# Patient Record
Sex: MALE | Race: WHITE | NOT HISPANIC OR LATINO | Employment: OTHER | URBAN - METROPOLITAN AREA
[De-identification: names, ages, dates, MRNs, and addresses within clinical notes are randomized per-mention and may not be internally consistent; named-entity substitution may affect disease eponyms.]

---

## 2019-05-22 ENCOUNTER — TELEPHONE (OUTPATIENT)
Dept: GASTROENTEROLOGY | Facility: CLINIC | Age: 59
End: 2019-05-22

## 2019-05-29 ENCOUNTER — PREP FOR SURGERY (OUTPATIENT)
Dept: OTHER | Facility: HOSPITAL | Age: 59
End: 2019-05-29

## 2019-05-29 DIAGNOSIS — Z86.010 PERSONAL HISTORY OF COLONIC POLYPS: Primary | ICD-10-CM

## 2019-05-31 PROBLEM — Z86.010 PERSONAL HISTORY OF COLONIC POLYPS: Status: ACTIVE | Noted: 2019-05-31

## 2019-05-31 PROBLEM — Z86.0100 PERSONAL HISTORY OF COLONIC POLYPS: Status: ACTIVE | Noted: 2019-05-31

## 2019-05-31 NOTE — TELEPHONE ENCOUNTER
CALLED AND SPOKE WITH PATIENT.  [facility] AT 3[facility] - ARRIVE 2PM.  WILL MAIL INSTRUCTIONS.

## 2019-07-30 ENCOUNTER — ANESTHESIA (OUTPATIENT)
Dept: GASTROENTEROLOGY | Facility: HOSPITAL | Age: 59
End: 2019-07-30

## 2019-07-30 ENCOUNTER — ANESTHESIA EVENT (OUTPATIENT)
Dept: GASTROENTEROLOGY | Facility: HOSPITAL | Age: 59
End: 2019-07-30

## 2019-07-30 ENCOUNTER — HOSPITAL ENCOUNTER (OUTPATIENT)
Facility: HOSPITAL | Age: 59
Setting detail: HOSPITAL OUTPATIENT SURGERY
Discharge: HOME | End: 2019-07-30
Attending: INTERNAL MEDICINE | Admitting: INTERNAL MEDICINE

## 2019-07-30 VITALS
HEART RATE: 69 BPM | BODY MASS INDEX: 28.09 KG/M2 | WEIGHT: 218.9 LBS | TEMPERATURE: 98.2 F | DIASTOLIC BLOOD PRESSURE: 89 MMHG | SYSTOLIC BLOOD PRESSURE: 122 MMHG | OXYGEN SATURATION: 97 % | RESPIRATION RATE: 16 BRPM | HEIGHT: 74 IN

## 2019-07-30 DIAGNOSIS — Z86.010 PERSONAL HISTORY OF COLONIC POLYPS: ICD-10-CM

## 2019-07-30 LAB — GLUCOSE BLDC GLUCOMTR-MCNC: 100 MG/DL (ref 70–130)

## 2019-07-30 PROCEDURE — 25010000002 PROPOFOL 10 MG/ML EMULSION: Performed by: ANESTHESIOLOGY

## 2019-07-30 PROCEDURE — 45385 COLONOSCOPY W/LESION REMOVAL: CPT | Performed by: INTERNAL MEDICINE

## 2019-07-30 PROCEDURE — 82962 GLUCOSE BLOOD TEST: CPT

## 2019-07-30 PROCEDURE — 45380 COLONOSCOPY AND BIOPSY: CPT | Performed by: INTERNAL MEDICINE

## 2019-07-30 PROCEDURE — 88305 TISSUE EXAM BY PATHOLOGIST: CPT | Performed by: INTERNAL MEDICINE

## 2019-07-30 RX ORDER — CHOLECALCIFEROL (VITAMIN D3) 125 MCG
500 CAPSULE ORAL DAILY
COMMUNITY

## 2019-07-30 RX ORDER — SODIUM CHLORIDE 0.9 % (FLUSH) 0.9 %
3-10 SYRINGE (ML) INJECTION AS NEEDED
Status: DISCONTINUED | OUTPATIENT
Start: 2019-07-30 | End: 2019-07-30 | Stop reason: HOSPADM

## 2019-07-30 RX ORDER — PROMETHAZINE HYDROCHLORIDE 25 MG/1
25 SUPPOSITORY RECTAL ONCE AS NEEDED
Status: DISCONTINUED | OUTPATIENT
Start: 2019-07-30 | End: 2019-07-30 | Stop reason: HOSPADM

## 2019-07-30 RX ORDER — PROMETHAZINE HYDROCHLORIDE 25 MG/ML
12.5 INJECTION, SOLUTION INTRAMUSCULAR; INTRAVENOUS ONCE AS NEEDED
Status: DISCONTINUED | OUTPATIENT
Start: 2019-07-30 | End: 2019-07-30 | Stop reason: HOSPADM

## 2019-07-30 RX ORDER — SODIUM CHLORIDE 0.9 % (FLUSH) 0.9 %
3 SYRINGE (ML) INJECTION EVERY 12 HOURS SCHEDULED
Status: DISCONTINUED | OUTPATIENT
Start: 2019-07-30 | End: 2019-07-30 | Stop reason: HOSPADM

## 2019-07-30 RX ORDER — PROPOFOL 10 MG/ML
VIAL (ML) INTRAVENOUS CONTINUOUS PRN
Status: DISCONTINUED | OUTPATIENT
Start: 2019-07-30 | End: 2019-07-30 | Stop reason: SURG

## 2019-07-30 RX ORDER — PROPOFOL 10 MG/ML
VIAL (ML) INTRAVENOUS AS NEEDED
Status: DISCONTINUED | OUTPATIENT
Start: 2019-07-30 | End: 2019-07-30 | Stop reason: SURG

## 2019-07-30 RX ORDER — PROMETHAZINE HYDROCHLORIDE 25 MG/1
25 TABLET ORAL ONCE AS NEEDED
Status: DISCONTINUED | OUTPATIENT
Start: 2019-07-30 | End: 2019-07-30 | Stop reason: HOSPADM

## 2019-07-30 RX ORDER — LIDOCAINE HYDROCHLORIDE 20 MG/ML
INJECTION, SOLUTION INFILTRATION; PERINEURAL AS NEEDED
Status: DISCONTINUED | OUTPATIENT
Start: 2019-07-30 | End: 2019-07-30 | Stop reason: SURG

## 2019-07-30 RX ORDER — SODIUM CHLORIDE, SODIUM LACTATE, POTASSIUM CHLORIDE, CALCIUM CHLORIDE 600; 310; 30; 20 MG/100ML; MG/100ML; MG/100ML; MG/100ML
30 INJECTION, SOLUTION INTRAVENOUS CONTINUOUS PRN
Status: DISCONTINUED | OUTPATIENT
Start: 2019-07-30 | End: 2019-07-30 | Stop reason: HOSPADM

## 2019-07-30 RX ADMIN — SODIUM CHLORIDE, POTASSIUM CHLORIDE, SODIUM LACTATE AND CALCIUM CHLORIDE 30 ML/HR: 600; 310; 30; 20 INJECTION, SOLUTION INTRAVENOUS at 14:08

## 2019-07-30 RX ADMIN — LIDOCAINE HYDROCHLORIDE 50 MG: 20 INJECTION, SOLUTION INFILTRATION; PERINEURAL at 15:29

## 2019-07-30 RX ADMIN — PROPOFOL 200 MCG/KG/MIN: 10 INJECTION, EMULSION INTRAVENOUS at 15:32

## 2019-07-30 RX ADMIN — PROPOFOL 100 MG: 10 INJECTION, EMULSION INTRAVENOUS at 15:31

## 2019-07-30 NOTE — ANESTHESIA PREPROCEDURE EVALUATION
Anesthesia Evaluation     Patient summary reviewed and Nursing notes reviewed   NPO Solid Status: > 8 hours  NPO Liquid Status: > 2 hours           Airway   Mallampati: II  TM distance: <3 FB  Neck ROM: full  Possible difficult intubation  Dental - normal exam     Pulmonary - normal exam   (+) sleep apnea (bipap),   Cardiovascular - normal exam    (+) hypertension less than 2 medications,       Neuro/Psych  GI/Hepatic/Renal/Endo    (+)   diabetes mellitus type 2,     Musculoskeletal     Abdominal    Substance History      OB/GYN          Other                        Anesthesia Plan    ASA 2     MAC     Anesthetic plan, all risks, benefits, and alternatives have been provided, discussed and informed consent has been obtained with: patient.

## 2019-07-30 NOTE — ANESTHESIA POSTPROCEDURE EVALUATION
"Patient: Noah Flannery Jr.    Procedure Summary     Date:  07/30/19 Room / Location:  [facility] ENDOSCOPY 10 / [facility] ENDOSCOPY    Anesthesia Start:  1516 Anesthesia Stop:  1608    Procedure:  COLONOSCOPY to cecum into TI with cold biopsy and cold snare polypectomies (N/A ) Diagnosis:       Personal history of colonic polyps      Colon polyp      (Personal history of colonic polyps [Z86.010])    Surgeon:  Thomas Concepcion MD Provider:  Kiran Summers MD    Anesthesia Type:  MAC ASA Status:  2          Anesthesia Type: MAC  Last vitals  BP   122/89 (07/30/19 1627)   Temp   36.8 °C (98.2 °F) (07/30/19 1355)   Pulse   69 (07/30/19 1627)   Resp   16 (07/30/19 1627)     SpO2   97 % (07/30/19 1627)     Post Anesthesia Care and Evaluation    Patient location during evaluation: bedside  Patient participation: complete - patient participated  Level of consciousness: sleepy but conscious  Pain score: 0  Pain management: adequate  Airway patency: patent  Anesthetic complications: No anesthetic complications    Cardiovascular status: acceptable  Respiratory status: acceptable  Hydration status: acceptable    Comments: /89 (BP Location: Left arm, Patient Position: Sitting)   Pulse 69   Temp 36.8 °C (98.2 °F) (Oral)   Resp 16   Ht 188 cm (74\")   Wt 99.3 kg (218 lb 14.4 oz)   SpO2 97%   BMI 28.11 kg/m²         "

## 2019-08-01 LAB
CYTO UR: NORMAL
LAB AP CASE REPORT: NORMAL
PATH REPORT.FINAL DX SPEC: NORMAL
PATH REPORT.GROSS SPEC: NORMAL

## 2019-10-24 ENCOUNTER — OFFICE VISIT (OUTPATIENT)
Dept: CARDIOLOGY | Facility: CLINIC | Age: 59
End: 2019-10-24

## 2019-10-24 VITALS
HEART RATE: 90 BPM | HEIGHT: 74 IN | WEIGHT: 223 LBS | DIASTOLIC BLOOD PRESSURE: 86 MMHG | SYSTOLIC BLOOD PRESSURE: 121 MMHG | BODY MASS INDEX: 28.62 KG/M2

## 2019-10-24 DIAGNOSIS — R00.2 PALPITATIONS: Primary | ICD-10-CM

## 2019-10-24 DIAGNOSIS — I10 ESSENTIAL HYPERTENSION: ICD-10-CM

## 2019-10-24 PROCEDURE — 93000 ELECTROCARDIOGRAM COMPLETE: CPT | Performed by: INTERNAL MEDICINE

## 2019-10-24 PROCEDURE — 99203 OFFICE O/P NEW LOW 30 MIN: CPT | Performed by: INTERNAL MEDICINE

## 2019-10-24 RX ORDER — MELOXICAM 15 MG/1
TABLET ORAL AS NEEDED
COMMUNITY
Start: 2019-09-27 | End: 2021-03-10

## 2019-10-24 RX ORDER — LEVOCETIRIZINE DIHYDROCHLORIDE 5 MG/1
5 TABLET, FILM COATED ORAL EVERY EVENING
COMMUNITY

## 2019-10-24 RX ORDER — BISOPROLOL FUMARATE 5 MG/1
5 TABLET, FILM COATED ORAL DAILY
Qty: 90 TABLET | Refills: 3 | Status: SHIPPED | OUTPATIENT
Start: 2019-10-24 | End: 2020-10-23

## 2019-10-24 NOTE — PROGRESS NOTES
Subjective:        [facility]  Cardiology Consult Note    Patient Identification:  Name: Noah Flannery Jr.  Age: 59 y.o.  Sex: male  :  1960  MRN: [id number]             CC  RAPID HEART BEAT , 1 YR AGO  REAT 2-3 TIMES IN 1 YR  LAST FEW MINUTES TO FEW HRS      History of Present Illness:   H/O DM  59-year-old male here for the cardiac evaluation as the patient has been having intermittent episodes of moderate intensity palpitations starting approximately 1 year ago lasting for several minutes sudden onset associated with shortness of breath and anxiety with no chest pains  Comorbid cardiac risk factors:     Past Medical History:  Past Medical History:   Diagnosis Date   • Broken ankle    • Broken ribs     right   • Colon polyp    • Diabetes mellitus (CMS/HCC)    • Hyperlipidemia    • Hypertension    • Sleep apnea      Past Surgical History:  Past Surgical History:   Procedure Laterality Date   • ANKLE SURGERY     • APPENDECTOMY     • COLONOSCOPY     • COLONOSCOPY N/A 2019    Procedure: COLONOSCOPY to cecum into TI with cold biopsy and cold snare polypectomies;  Surgeon: Thomas Concepcion MD;  Location: [facility] ENDOSCOPY;  Service: Gastroenterology   • HERNIA REPAIR     • PARATHYROID GLAND SURGERY        Allergies:  No Known Allergies  Home Meds:    (Not in a hospital admission)  Current Meds:   [unfilled]  Social History:   Social History     Tobacco Use   • Smoking status: Never Smoker   • Smokeless tobacco: Never Used   Substance Use Topics   • Alcohol use: Yes     Frequency: Never     Comment: occ      Family History:  Family History   Problem Relation Age of Onset   • Colon cancer Neg Hx    • Colon polyps Neg Hx         Review of Systems    Constitutional: No weakness,fatigue, fever, rigors, chills   Eyes: No vision changes, eye pain   ENT/oropharynx: No difficulty swallowing, sore throat, epistaxis, changes in hearing   Cardiovascular:  Palpitations with shortness of breath    Respiratory: No shortness of breath, dyspnea on exertion, cough, wheezing hemoptysis   Gastrointestinal: No abdominal pain, nausea, vomiting, diarrhea, bloody stools   Genitourinary: No hematuria, dysuria   Neurological: No headache, tremors, numbness,  one-sided weakness    Musculoskeletal: No cramps, myalgias,  joint pain, joint swelling   Integument: No rash, edema           Constitutional:  Heart Rate:  [90] 90  BP: (121)/(86) 121/86    Physical Exam   General:  Appears in no acute distress  Eyes: PERTL,  HEENT:  No JVD. Thyroid not visibly enlarged. No mucosal pallor or cyanosis  Respiratory: Respirations regular and unlabored at rest. BBS with good air entry in all fields. No crackles, rubs or wheezes auscultated  Cardiovascular: S1S2 Regular rate and rhythm. No murmur, rub or gallop auscultated. No carotid bruits. DP/PT pulses    . No pretibial pitting edema  Gastrointestinal: Abdomen soft, flat, non tender. Bowel sounds present. No hepatosplenomegaly. No ascites  Musculoskeletal: ROSAS x4. No abnormal movements  Extremities: No digital clubbing or cyanosis  Skin: Color pink. Skin warm and dry to touch. No rashes  No xanthoma  Neuro: AAO x3 CN II-XII grossly intact            ECG 12 Lead  Date/Time: 10/24/2019 1:46 PM  Performed by: Norman Selby MD  Authorized by: Norman Selby MD   Comparison: compared with previous ECG   Similar to previous ECG  Rhythm: sinus rhythm  ST Flattening: all    Clinical impression: non-specific ECG                Cardiographics  ECG:     Telemetry:    Echocardiogram:     Imaging  Chest X-ray:     Lab Review               @[email or web address]@              Assessment:/ Recommendations / Plan:   Patient Active Problem List   Diagnosis   • Personal history of colonic polyps                    ICD-10-CM ICD-9-CM   1. Palpitations R00.2 785.1   2. Essential hypertension I10 401.9     1. Palpitations  Considering the patient's symptoms as well as clinical situation and  EKG  findings, along with cardiac risk factors, ischemic workup is necessary to rule out ischemic cardiomyopathy, stress induced arrhythmias, and functional capacity for diagnosis as well as prognostic consideration    Considering patient's medical condition as well as the risk factors, patient will require echocardiogram for further evaluation for the LV function, four-chamber evaluation, including the pressures, valvular function and  pericardial disease and pericardial effusion    - Stress Test With Myocardial Perfusion One Day  - Adult Transthoracic Echo Complete W/ Cont if Necessary Per Protocol    2. Essential hypertension  Blood pressure controlled  - Stress Test With Myocardial Perfusion One Day  - Adult Transthoracic Echo Complete W/ Cont if Necessary Per Protocol       STRESS TEST AND US    START ZIAC 5 MG PO DAILY    SEE IN 1 MONTH      Labs/tests ordered for am      Norman Selby MD  10/24/2019, 1:46 PM      EMR Dragon/Transcription:   Dictated utilizing Dragon dictation

## 2019-11-13 ENCOUNTER — HOSPITAL ENCOUNTER (OUTPATIENT)
Dept: CARDIOLOGY | Facility: HOSPITAL | Age: 59
Discharge: HOME | End: 2019-11-13
Admitting: INTERNAL MEDICINE

## 2019-11-13 ENCOUNTER — HOSPITAL ENCOUNTER (OUTPATIENT)
Dept: CARDIOLOGY | Facility: HOSPITAL | Age: 59
Discharge: HOME | End: 2019-11-13

## 2019-11-13 VITALS
DIASTOLIC BLOOD PRESSURE: 80 MMHG | BODY MASS INDEX: 27.21 KG/M2 | HEIGHT: 74 IN | WEIGHT: 212 LBS | SYSTOLIC BLOOD PRESSURE: 118 MMHG | HEART RATE: 71 BPM | RESPIRATION RATE: 16 BRPM | OXYGEN SATURATION: 96 %

## 2019-11-13 PROCEDURE — 93306 TTE W/DOPPLER COMPLETE: CPT

## 2019-11-13 PROCEDURE — 93018 CV STRESS TEST I&R ONLY: CPT | Performed by: INTERNAL MEDICINE

## 2019-11-13 PROCEDURE — 0 TECHNETIUM SESTAMIBI: Performed by: INTERNAL MEDICINE

## 2019-11-13 PROCEDURE — 25010000002 PERFLUTREN (DEFINITY) 8.476 MG IN SODIUM CHLORIDE (PF) 0.9 % 10 ML INJECTION: Performed by: INTERNAL MEDICINE

## 2019-11-13 PROCEDURE — 93306 TTE W/DOPPLER COMPLETE: CPT | Performed by: INTERNAL MEDICINE

## 2019-11-13 PROCEDURE — 93017 CV STRESS TEST TRACING ONLY: CPT

## 2019-11-13 PROCEDURE — 93016 CV STRESS TEST SUPVJ ONLY: CPT | Performed by: NURSE PRACTITIONER

## 2019-11-13 PROCEDURE — 78452 HT MUSCLE IMAGE SPECT MULT: CPT

## 2019-11-13 PROCEDURE — 78452 HT MUSCLE IMAGE SPECT MULT: CPT | Performed by: INTERNAL MEDICINE

## 2019-11-13 PROCEDURE — A9500 TC99M SESTAMIBI: HCPCS | Performed by: INTERNAL MEDICINE

## 2019-11-13 RX ORDER — LANCETS 28 GAUGE
EACH MISCELLANEOUS
COMMUNITY
Start: 2019-10-31 | End: 2020-12-30 | Stop reason: SDUPTHER

## 2019-11-13 RX ADMIN — SODIUM CHLORIDE 2.5 ML: 9 INJECTION INTRAMUSCULAR; INTRAVENOUS; SUBCUTANEOUS at 11:39

## 2019-11-13 RX ADMIN — TECHNETIUM TC 99M SESTAMIBI 1 DOSE: 1 INJECTION INTRAVENOUS at 07:44

## 2019-11-13 RX ADMIN — TECHNETIUM TC 99M SESTAMIBI 1 DOSE: 1 INJECTION INTRAVENOUS at 10:23

## 2019-11-14 LAB
BH CV STRESS BP STAGE 1: NORMAL
BH CV STRESS BP STAGE 2: NORMAL
BH CV STRESS BP STAGE 3: NORMAL
BH CV STRESS BP STAGE 4: NORMAL
BH CV STRESS DURATION MIN STAGE 1: 3
BH CV STRESS DURATION MIN STAGE 2: 3
BH CV STRESS DURATION MIN STAGE 3: 3
BH CV STRESS DURATION SEC STAGE 1: 0
BH CV STRESS DURATION SEC STAGE 2: 0
BH CV STRESS DURATION SEC STAGE 3: 0
BH CV STRESS DURATION SEC STAGE 4: 59
BH CV STRESS GRADE STAGE 1: 10
BH CV STRESS GRADE STAGE 2: 12
BH CV STRESS GRADE STAGE 3: 14
BH CV STRESS GRADE STAGE 4: 16
BH CV STRESS HR STAGE 1: 95
BH CV STRESS HR STAGE 2: 109
BH CV STRESS HR STAGE 3: 130
BH CV STRESS HR STAGE 4: 146
BH CV STRESS METS STAGE 1: 4.6
BH CV STRESS METS STAGE 2: 7
BH CV STRESS METS STAGE 3: 10.1
BH CV STRESS METS STAGE 4: 11
BH CV STRESS PROTOCOL 1: NORMAL
BH CV STRESS RECOVERY BP: NORMAL MMHG
BH CV STRESS RECOVERY HR: 83 BPM
BH CV STRESS RECOVERY O2: 96 %
BH CV STRESS SPEED STAGE 1: 1.7
BH CV STRESS SPEED STAGE 2: 2.5
BH CV STRESS SPEED STAGE 3: 3.4
BH CV STRESS SPEED STAGE 4: 4.2
BH CV STRESS STAGE 1: 1
BH CV STRESS STAGE 2: 2
BH CV STRESS STAGE 3: 3
BH CV STRESS STAGE 4: 4
LV EF NUC BP: 57 %
MAXIMAL PREDICTED HEART RATE: 162 BPM
PERCENT MAX PREDICTED HR: 90.12 %
STRESS BASELINE BP: NORMAL MMHG
STRESS BASELINE HR: 67 BPM
STRESS O2 SAT REST: 96 %
STRESS PERCENT HR: 106 %
STRESS POST ESTIMATED WORKLOAD: 11 METS
STRESS POST EXERCISE DUR MIN: 9 MIN
STRESS POST EXERCISE DUR SEC: 59 SEC
STRESS POST PEAK BP: NORMAL MMHG
STRESS POST PEAK HR: 146 BPM
STRESS TARGET HR: 138 BPM

## 2019-11-15 LAB
BH CV ECHO MEAS - ACS: 1.8 CM
BH CV ECHO MEAS - AO MAX PG (FULL): 4.7 MMHG
BH CV ECHO MEAS - AO MAX PG: 8 MMHG
BH CV ECHO MEAS - AO MEAN PG (FULL): 2 MMHG
BH CV ECHO MEAS - AO MEAN PG: 4 MMHG
BH CV ECHO MEAS - AO ROOT AREA (BSA CORRECTED): 1.5
BH CV ECHO MEAS - AO ROOT AREA: 9.6 CM^2
BH CV ECHO MEAS - AO ROOT DIAM: 3.5 CM
BH CV ECHO MEAS - AO V2 MAX: 141 CM/SEC
BH CV ECHO MEAS - AO V2 MEAN: 98.4 CM/SEC
BH CV ECHO MEAS - AO V2 VTI: 27.6 CM
BH CV ECHO MEAS - AVA(I,A): 2.8 CM^2
BH CV ECHO MEAS - AVA(I,D): 2.8 CM^2
BH CV ECHO MEAS - AVA(V,A): 2.4 CM^2
BH CV ECHO MEAS - AVA(V,D): 2.4 CM^2
BH CV ECHO MEAS - BSA(HAYCOCK): 2.3 M^2
BH CV ECHO MEAS - BSA: 2.3 M^2
BH CV ECHO MEAS - BZI_BMI: 28.6 KILOGRAMS/M^2
BH CV ECHO MEAS - BZI_METRIC_HEIGHT: 188 CM
BH CV ECHO MEAS - BZI_METRIC_WEIGHT: 101.2 KG
BH CV ECHO MEAS - EDV(CUBED): 85.2 ML
BH CV ECHO MEAS - EDV(MOD-SP2): 135 ML
BH CV ECHO MEAS - EDV(MOD-SP4): 148 ML
BH CV ECHO MEAS - EDV(TEICH): 87.7 ML
BH CV ECHO MEAS - EF(CUBED): 57.8 %
BH CV ECHO MEAS - EF(MOD-BP): 58 %
BH CV ECHO MEAS - EF(MOD-SP2): 55.6 %
BH CV ECHO MEAS - EF(MOD-SP4): 56.8 %
BH CV ECHO MEAS - EF(TEICH): 49.7 %
BH CV ECHO MEAS - ESV(CUBED): 35.9 ML
BH CV ECHO MEAS - ESV(MOD-SP2): 60 ML
BH CV ECHO MEAS - ESV(MOD-SP4): 64 ML
BH CV ECHO MEAS - ESV(TEICH): 44.1 ML
BH CV ECHO MEAS - FS: 25 %
BH CV ECHO MEAS - IVS/LVPW: 0.86
BH CV ECHO MEAS - IVSD: 1.2 CM
BH CV ECHO MEAS - LA DIMENSION: 4.5 CM
BH CV ECHO MEAS - LA/AO: 1.3
BH CV ECHO MEAS - LAT PEAK E' VEL: 8.3 CM/SEC
BH CV ECHO MEAS - LV DIASTOLIC VOL/BSA (35-75): 65 ML/M^2
BH CV ECHO MEAS - LV MASS(C)D: 215.1 GRAMS
BH CV ECHO MEAS - LV MASS(C)DI: 94.5 GRAMS/M^2
BH CV ECHO MEAS - LV MAX PG: 3.3 MMHG
BH CV ECHO MEAS - LV MEAN PG: 2 MMHG
BH CV ECHO MEAS - LV SYSTOLIC VOL/BSA (12-30): 28.1 ML/M^2
BH CV ECHO MEAS - LV V1 MAX: 90.2 CM/SEC
BH CV ECHO MEAS - LV V1 MEAN: 64.1 CM/SEC
BH CV ECHO MEAS - LV V1 VTI: 20 CM
BH CV ECHO MEAS - LVIDD: 4.4 CM
BH CV ECHO MEAS - LVIDS: 3.3 CM
BH CV ECHO MEAS - LVLD AP2: 8.4 CM
BH CV ECHO MEAS - LVLD AP4: 9 CM
BH CV ECHO MEAS - LVLS AP2: 6.5 CM
BH CV ECHO MEAS - LVLS AP4: 6.6 CM
BH CV ECHO MEAS - LVOT AREA (M): 3.8 CM^2
BH CV ECHO MEAS - LVOT AREA: 3.8 CM^2
BH CV ECHO MEAS - LVOT DIAM: 2.2 CM
BH CV ECHO MEAS - LVPWD: 1.4 CM
BH CV ECHO MEAS - MED PEAK E' VEL: 8.2 CM/SEC
BH CV ECHO MEAS - MV A DUR: 0.14 SEC
BH CV ECHO MEAS - MV A MAX VEL: 79.5 CM/SEC
BH CV ECHO MEAS - MV DEC SLOPE: 228 CM/SEC^2
BH CV ECHO MEAS - MV DEC TIME: 0.22 SEC
BH CV ECHO MEAS - MV E MAX VEL: 55.3 CM/SEC
BH CV ECHO MEAS - MV E/A: 0.7
BH CV ECHO MEAS - MV MAX PG: 2.9 MMHG
BH CV ECHO MEAS - MV MEAN PG: 1 MMHG
BH CV ECHO MEAS - MV P1/2T MAX VEL: 67.9 CM/SEC
BH CV ECHO MEAS - MV P1/2T: 87.2 MSEC
BH CV ECHO MEAS - MV V2 MAX: 84.6 CM/SEC
BH CV ECHO MEAS - MV V2 MEAN: 47.1 CM/SEC
BH CV ECHO MEAS - MV V2 VTI: 20.1 CM
BH CV ECHO MEAS - MVA P1/2T LCG: 3.2 CM^2
BH CV ECHO MEAS - MVA(P1/2T): 2.5 CM^2
BH CV ECHO MEAS - MVA(VTI): 3.8 CM^2
BH CV ECHO MEAS - PA ACC TIME: 0.1 SEC
BH CV ECHO MEAS - PA MAX PG (FULL): 1.2 MMHG
BH CV ECHO MEAS - PA MAX PG: 2.7 MMHG
BH CV ECHO MEAS - PA PR(ACCEL): 36.3 MMHG
BH CV ECHO MEAS - PA V2 MAX: 81.4 CM/SEC
BH CV ECHO MEAS - PULM A REVS DUR: 0.15 SEC
BH CV ECHO MEAS - PULM A REVS VEL: 25.7 CM/SEC
BH CV ECHO MEAS - PULM DIAS VEL: 28.3 CM/SEC
BH CV ECHO MEAS - PULM S/D: 2
BH CV ECHO MEAS - PULM SYS VEL: 58 CM/SEC
BH CV ECHO MEAS - PVA(V,A): 3.7 CM^2
BH CV ECHO MEAS - PVA(V,D): 3.7 CM^2
BH CV ECHO MEAS - QP/QS: 0.94
BH CV ECHO MEAS - RAP SYSTOLE: 3 MMHG
BH CV ECHO MEAS - RV MAX PG: 1.5 MMHG
BH CV ECHO MEAS - RV MEAN PG: 1 MMHG
BH CV ECHO MEAS - RV V1 MAX: 60.6 CM/SEC
BH CV ECHO MEAS - RV V1 MEAN: 44.8 CM/SEC
BH CV ECHO MEAS - RV V1 VTI: 14.5 CM
BH CV ECHO MEAS - RVOT AREA: 4.9 CM^2
BH CV ECHO MEAS - RVOT DIAM: 2.5 CM
BH CV ECHO MEAS - RVSP: 30.5 MMHG
BH CV ECHO MEAS - SI(AO): 116.7 ML/M^2
BH CV ECHO MEAS - SI(CUBED): 21.6 ML/M^2
BH CV ECHO MEAS - SI(LVOT): 33.4 ML/M^2
BH CV ECHO MEAS - SI(MOD-SP2): 33 ML/M^2
BH CV ECHO MEAS - SI(MOD-SP4): 36.9 ML/M^2
BH CV ECHO MEAS - SI(TEICH): 19.1 ML/M^2
BH CV ECHO MEAS - SV(AO): 265.5 ML
BH CV ECHO MEAS - SV(CUBED): 49.2 ML
BH CV ECHO MEAS - SV(LVOT): 76 ML
BH CV ECHO MEAS - SV(MOD-SP2): 75 ML
BH CV ECHO MEAS - SV(MOD-SP4): 84 ML
BH CV ECHO MEAS - SV(RVOT): 71.2 ML
BH CV ECHO MEAS - SV(TEICH): 43.6 ML
BH CV ECHO MEAS - TAPSE (>1.6): 2.1 CM
BH CV ECHO MEAS - TR MAX VEL: 262 CM/SEC
BH CV ECHO MEASUREMENTS AVERAGE E/E' RATIO: 6.7
BH CV XLRA - RV BASE: 4.5 CM
BH CV XLRA - RV LENGTH: 7.5 CM
BH CV XLRA - RV MID: 3.5 CM
BH CV XLRA - TDI S': 13.7 CM/SEC
LEFT ATRIUM VOLUME INDEX: 28 ML/M2
MAXIMAL PREDICTED HEART RATE: 162 BPM
STRESS TARGET HR: 138 BPM

## 2019-11-21 ENCOUNTER — OFFICE VISIT (OUTPATIENT)
Dept: CARDIOLOGY | Facility: CLINIC | Age: 59
End: 2019-11-21

## 2019-11-21 VITALS
HEART RATE: 64 BPM | SYSTOLIC BLOOD PRESSURE: 124 MMHG | WEIGHT: 228 LBS | HEIGHT: 74 IN | DIASTOLIC BLOOD PRESSURE: 85 MMHG | BODY MASS INDEX: 29.26 KG/M2

## 2019-11-21 DIAGNOSIS — I10 ESSENTIAL HYPERTENSION: ICD-10-CM

## 2019-11-21 DIAGNOSIS — R00.2 PALPITATIONS: Primary | ICD-10-CM

## 2019-11-21 PROCEDURE — 99212 OFFICE O/P EST SF 10 MIN: CPT | Performed by: INTERNAL MEDICINE

## 2019-11-21 RX ORDER — LORATADINE 10 MG/1
10 TABLET ORAL DAILY
COMMUNITY
End: 2021-04-08

## 2019-12-17 ENCOUNTER — TELEPHONE (OUTPATIENT)
Dept: CARDIOLOGY | Facility: CLINIC | Age: 59
End: 2019-12-17

## 2019-12-17 NOTE — TELEPHONE ENCOUNTER
----- Message from Najma Garcia sent at 12/17/2019  2:22 PM EST -----  Regarding: CLEARANCE NOTE  Contact: [phone]  DR HAGAN, PT HAVING SHOULDER SURGERY IN JAN OR FEB  OFFICE IS WANTING A CLEARANCE NOTE FAXED TO THEM        Will discuss with Dr Selby      PT OK TO PROCEED WITH SHOULDER SURGERY PER JESS OLEA

## 2020-01-14 ENCOUNTER — HOSPITAL ENCOUNTER (OUTPATIENT)
Dept: GENERAL RADIOLOGY | Facility: HOSPITAL | Age: 60
Discharge: HOME | End: 2020-01-14
Attending: ORTHOPAEDIC SURGERY

## 2020-01-27 ENCOUNTER — TELEPHONE (OUTPATIENT)
Dept: CARDIOLOGY | Facility: CLINIC | Age: 60
End: 2020-01-27

## 2020-01-27 NOTE — TELEPHONE ENCOUNTER
----- Message from Bárbara Pritchard sent at 1/27/2020  9:59 AM EST -----  Regarding: CARDIAC CLEARANCE FOR SHOULDER SX  Contact: [phone]  DR CHOI       Faxed letter to Dr Choi   [phone]

## 2020-03-05 ENCOUNTER — HOSPITAL ENCOUNTER (OUTPATIENT)
Dept: GENERAL RADIOLOGY | Facility: HOSPITAL | Age: 60
Discharge: HOME | End: 2020-03-05
Attending: ORTHOPAEDIC SURGERY

## 2020-03-05 LAB
ANION GAP SERPL CALC-SCNC: 22 MMOL/L (ref 8–19)
APPEARANCE UR: CLEAR
BASOPHILS # BLD AUTO: 0.07 10*3/UL (ref 0–0.2)
BASOPHILS NFR BLD AUTO: 1.3 % (ref 0–3)
BILIRUB UR QL: NEGATIVE
BUN SERPL-MCNC: 16 MG/DL (ref 5–25)
BUN/CREAT SERPL: 14 {RATIO} (ref 6–20)
CALCIUM SERPL-MCNC: 9.3 MG/DL (ref 8.7–10.4)
CHLORIDE SERPL-SCNC: 104 MMOL/L (ref 99–111)
COLOR UR: YELLOW
CONV ABS IMM GRAN: 0.02 10*3/UL (ref 0–0.2)
CONV CO2: 22 MMOL/L (ref 22–32)
CONV COLLECTION SOURCE (UA): ABNORMAL
CONV IMMATURE GRAN: 0.4 % (ref 0–1.8)
CONV UROBILINOGEN IN URINE BY AUTOMATED TEST STRIP: 0.2 {EHRLICHU}/DL (ref 0.1–1)
CREAT UR-MCNC: 1.17 MG/DL (ref 0.7–1.2)
DEPRECATED RDW RBC AUTO: 42.5 FL (ref 35.1–43.9)
EOSINOPHIL # BLD AUTO: 0.07 10*3/UL (ref 0–0.7)
EOSINOPHIL # BLD AUTO: 1.3 % (ref 0–7)
ERYTHROCYTE [DISTWIDTH] IN BLOOD BY AUTOMATED COUNT: 13.1 % (ref 11.6–14.4)
EST. AVERAGE GLUCOSE BLD GHB EST-MCNC: 166 MG/DL
GFR SERPLBLD BASED ON 1.73 SQ M-ARVRAT: >60 ML/MIN/{1.73_M2}
GLUCOSE SERPL-MCNC: 181 MG/DL (ref 70–99)
GLUCOSE UR QL: >=1000 MG/DL
HBA1C MFR BLD: 7.4 % (ref 3.5–5.7)
HCT VFR BLD AUTO: 48.8 % (ref 42–52)
HGB BLD-MCNC: 16.3 G/DL (ref 14–18)
HGB UR QL STRIP: NEGATIVE
KETONES UR QL STRIP: NEGATIVE MG/DL
LEUKOCYTE ESTERASE UR QL STRIP: NEGATIVE
LYMPHOCYTES # BLD AUTO: 1.89 10*3/UL (ref 1–5)
LYMPHOCYTES NFR BLD AUTO: 35.2 % (ref 20–45)
MCH RBC QN AUTO: 29.6 PG (ref 27–31)
MCHC RBC AUTO-ENTMCNC: 33.4 G/DL (ref 33–37)
MCV RBC AUTO: 88.6 FL (ref 80–96)
MONOCYTES # BLD AUTO: 0.44 10*3/UL (ref 0.2–1.2)
MONOCYTES NFR BLD AUTO: 8.2 % (ref 3–10)
NEUTROPHILS # BLD AUTO: 2.88 10*3/UL (ref 2–8)
NEUTROPHILS NFR BLD AUTO: 53.6 % (ref 30–85)
NITRITE UR QL STRIP: NEGATIVE
NRBC CBCN: 0 % (ref 0–0.7)
OSMOLALITY SERPL CALC.SUM OF ELEC: 304 MOSM/KG (ref 273–304)
PH UR STRIP.AUTO: 6 [PH] (ref 5–8)
PLATELET # BLD AUTO: 193 10*3/UL (ref 130–400)
PMV BLD AUTO: 10.9 FL (ref 9.4–12.4)
POTASSIUM SERPL-SCNC: 4.3 MMOL/L (ref 3.5–5.3)
PROT UR QL: NEGATIVE MG/DL
RBC # BLD AUTO: 5.51 10*6/UL (ref 4.7–6.1)
SODIUM SERPL-SCNC: 144 MMOL/L (ref 135–147)
SP GR UR: 1.03 (ref 1–1.03)
WBC # BLD AUTO: 5.37 10*3/UL (ref 4.8–10.8)

## 2020-04-28 ENCOUNTER — HOSPITAL ENCOUNTER (OUTPATIENT)
Dept: OTHER | Facility: HOSPITAL | Age: 60
Discharge: HOME | End: 2020-04-28
Attending: ORTHOPAEDIC SURGERY

## 2020-04-28 LAB
ANION GAP SERPL CALC-SCNC: 18 MMOL/L (ref 8–19)
BASOPHILS # BLD AUTO: 0.04 10*3/UL (ref 0–0.2)
BASOPHILS NFR BLD AUTO: 0.7 % (ref 0–3)
BUN SERPL-MCNC: 14 MG/DL (ref 5–25)
BUN/CREAT SERPL: 12 {RATIO} (ref 6–20)
CALCIUM SERPL-MCNC: 9.5 MG/DL (ref 8.7–10.4)
CHLORIDE SERPL-SCNC: 103 MMOL/L (ref 99–111)
CONV ABS IMM GRAN: 0.01 10*3/UL (ref 0–0.2)
CONV CO2: 25 MMOL/L (ref 22–32)
CONV IMMATURE GRAN: 0.2 % (ref 0–1.8)
CREAT UR-MCNC: 1.14 MG/DL (ref 0.7–1.2)
DEPRECATED RDW RBC AUTO: 42.7 FL (ref 35.1–43.9)
EOSINOPHIL # BLD AUTO: 0.09 10*3/UL (ref 0–0.7)
EOSINOPHIL # BLD AUTO: 1.6 % (ref 0–7)
ERYTHROCYTE [DISTWIDTH] IN BLOOD BY AUTOMATED COUNT: 13.2 % (ref 11.6–14.4)
EST. AVERAGE GLUCOSE BLD GHB EST-MCNC: 171 MG/DL
GFR SERPLBLD BASED ON 1.73 SQ M-ARVRAT: >60 ML/MIN/{1.73_M2}
GLUCOSE SERPL-MCNC: 134 MG/DL (ref 70–99)
HBA1C MFR BLD: 7.6 % (ref 3.5–5.7)
HCT VFR BLD AUTO: 46.7 % (ref 42–52)
HGB BLD-MCNC: 16 G/DL (ref 14–18)
LYMPHOCYTES # BLD AUTO: 1.99 10*3/UL (ref 1–5)
LYMPHOCYTES NFR BLD AUTO: 36.1 % (ref 20–45)
MCH RBC QN AUTO: 30 PG (ref 27–31)
MCHC RBC AUTO-ENTMCNC: 34.3 G/DL (ref 33–37)
MCV RBC AUTO: 87.6 FL (ref 80–96)
MONOCYTES # BLD AUTO: 0.45 10*3/UL (ref 0.2–1.2)
MONOCYTES NFR BLD AUTO: 8.2 % (ref 3–10)
NEUTROPHILS # BLD AUTO: 2.94 10*3/UL (ref 2–8)
NEUTROPHILS NFR BLD AUTO: 53.2 % (ref 30–85)
NRBC CBCN: 0 % (ref 0–0.7)
OSMOLALITY SERPL CALC.SUM OF ELEC: 294 MOSM/KG (ref 273–304)
PLATELET # BLD AUTO: 179 10*3/UL (ref 130–400)
PMV BLD AUTO: 10.6 FL (ref 9.4–12.4)
POTASSIUM SERPL-SCNC: 4.6 MMOL/L (ref 3.5–5.3)
RBC # BLD AUTO: 5.33 10*6/UL (ref 4.7–6.1)
SODIUM SERPL-SCNC: 141 MMOL/L (ref 135–147)
WBC # BLD AUTO: 5.52 10*3/UL (ref 4.8–10.8)

## 2020-09-08 ENCOUNTER — OFFICE VISIT (OUTPATIENT)
Dept: FAMILY MEDICINE CLINIC | Facility: CLINIC | Age: 60
End: 2020-09-08

## 2020-09-08 VITALS
HEIGHT: 74 IN | RESPIRATION RATE: 16 BRPM | BODY MASS INDEX: 28.23 KG/M2 | OXYGEN SATURATION: 97 % | DIASTOLIC BLOOD PRESSURE: 80 MMHG | WEIGHT: 220 LBS | TEMPERATURE: 98.2 F | HEART RATE: 67 BPM | SYSTOLIC BLOOD PRESSURE: 110 MMHG

## 2020-09-08 DIAGNOSIS — E11.649 TYPE 2 DIABETES MELLITUS WITH HYPOGLYCEMIA WITHOUT COMA, WITH LONG-TERM CURRENT USE OF INSULIN (HCC): Primary | ICD-10-CM

## 2020-09-08 DIAGNOSIS — E78.2 MIXED HYPERLIPIDEMIA: ICD-10-CM

## 2020-09-08 DIAGNOSIS — Z79.4 TYPE 2 DIABETES MELLITUS WITH HYPOGLYCEMIA WITHOUT COMA, WITH LONG-TERM CURRENT USE OF INSULIN (HCC): Primary | ICD-10-CM

## 2020-09-08 DIAGNOSIS — I10 ESSENTIAL HYPERTENSION: ICD-10-CM

## 2020-09-08 PROBLEM — E83.52 HYPERCALCEMIA: Status: ACTIVE | Noted: 2017-08-11

## 2020-09-08 PROBLEM — G47.33 OSA (OBSTRUCTIVE SLEEP APNEA): Status: ACTIVE | Noted: 2019-03-28

## 2020-09-08 PROBLEM — E21.3 HYPERPARATHYROIDISM: Status: ACTIVE | Noted: 2020-09-08

## 2020-09-08 PROBLEM — E78.5 HYPERLIPIDEMIA: Status: ACTIVE | Noted: 2020-09-08

## 2020-09-08 PROCEDURE — 99213 OFFICE O/P EST LOW 20 MIN: CPT | Performed by: INTERNAL MEDICINE

## 2020-09-08 RX ORDER — METFORMIN HYDROCHLORIDE 500 MG/1
TABLET, EXTENDED RELEASE ORAL DAILY
COMMUNITY
Start: 2020-08-23 | End: 2020-09-08

## 2020-09-09 LAB
ALBUMIN SERPL-MCNC: 4.9 G/DL (ref 3.8–4.9)
ALBUMIN/GLOB SERPL: 2.5 {RATIO} (ref 1.2–2.2)
ALP SERPL-CCNC: 78 IU/L (ref 39–117)
ALT SERPL-CCNC: 27 IU/L (ref 0–44)
AST SERPL-CCNC: 21 IU/L (ref 0–40)
BILIRUB SERPL-MCNC: 0.7 MG/DL (ref 0–1.2)
BUN SERPL-MCNC: 14 MG/DL (ref 6–24)
BUN/CREAT SERPL: 12 (ref 9–20)
CALCIUM SERPL-MCNC: 9.7 MG/DL (ref 8.7–10.2)
CHLORIDE SERPL-SCNC: 102 MMOL/L (ref 96–106)
CHOLEST SERPL-MCNC: 133 MG/DL (ref 100–199)
CO2 SERPL-SCNC: 26 MMOL/L (ref 20–29)
CREAT SERPL-MCNC: 1.13 MG/DL (ref 0.76–1.27)
ERYTHROCYTE [DISTWIDTH] IN BLOOD BY AUTOMATED COUNT: 14.1 % (ref 11.6–15.4)
GLOBULIN SER CALC-MCNC: 2 G/DL (ref 1.5–4.5)
GLUCOSE SERPL-MCNC: 112 MG/DL (ref 65–99)
HBA1C MFR BLD: 7.6 % (ref 4.8–5.6)
HCT VFR BLD AUTO: 47.5 % (ref 37.5–51)
HDLC SERPL-MCNC: 32 MG/DL
HGB BLD-MCNC: 15.8 G/DL (ref 13–17.7)
LDLC SERPL CALC-MCNC: 71 MG/DL (ref 0–99)
MCH RBC QN AUTO: 28.8 PG (ref 26.6–33)
MCHC RBC AUTO-ENTMCNC: 33.3 G/DL (ref 31.5–35.7)
MCV RBC AUTO: 87 FL (ref 79–97)
PLATELET # BLD AUTO: 187 X10E3/UL (ref 150–450)
POTASSIUM SERPL-SCNC: 4.1 MMOL/L (ref 3.5–5.2)
PROT SERPL-MCNC: 6.9 G/DL (ref 6–8.5)
RBC # BLD AUTO: 5.48 X10E6/UL (ref 4.14–5.8)
SODIUM SERPL-SCNC: 141 MMOL/L (ref 134–144)
TRIGL SERPL-MCNC: 173 MG/DL (ref 0–149)
VLDLC SERPL CALC-MCNC: 30 MG/DL (ref 5–40)
WBC # BLD AUTO: 6.2 X10E3/UL (ref 3.4–10.8)

## 2020-09-10 NOTE — PROGRESS NOTES
09/08/2020    CC: Hypertension (follow up)  .        HPI  Hypertension   This is a chronic problem. The current episode started more than 1 month ago. The problem has been gradually improving since onset. The problem is controlled. There are no associated agents to hypertension.        Subjective   Noah Flannery Jr. is a 60 y.o. male.      The following portions of the patient's history were reviewed and updated as appropriate: allergies, current medications, past family history, past medical history, past social history, past surgical history and problem list.    Problem List  Patient Active Problem List   Diagnosis   • Personal history of colonic polyps   • Palpitations   • Hypertension   • Hypercalcemia   • Hyperlipidemia   • Hyperparathyroidism (CMS/HCC)   • JACQUELYN (obstructive sleep apnea)       Past Medical History  Past Medical History:   Diagnosis Date   • Broken ankle    • Broken ribs     right   • Colon polyp    • Diabetes mellitus (CMS/HCC)    • Hyperlipidemia    • Hypertension    • Sleep apnea        Surgical History  Past Surgical History:   Procedure Laterality Date   • ANKLE SURGERY     • APPENDECTOMY     • COLONOSCOPY     • COLONOSCOPY N/A 7/30/2019    Procedure: COLONOSCOPY to cecum into TI with cold biopsy and cold snare polypectomies;  Surgeon: Thomas Concepcion MD;  Location: [facility] ENDOSCOPY;  Service: Gastroenterology   • HERNIA REPAIR     • PARATHYROID GLAND SURGERY     • TOTAL SHOULDER REPLACEMENT Left        Family History  Family History   Problem Relation Age of Onset   • Diabetes Mother    • Hypertension Mother    • Heart disease Mother    • Lung cancer Mother    • Liver cancer Mother    • Heart disease Father    • Lung cancer Father    • Colon cancer Neg Hx    • Colon polyps Neg Hx        Social History  Social History    Tobacco Use      Smoking status: Never Smoker      Smokeless tobacco: Never Used       Is the Patient a current tobacco user? No    Allergies  No Known  Allergies    Current Medications    Current Outpatient Medications:   •  bisoprolol (ZEBETA) 5 MG tablet, Take 1 tablet by mouth Daily., Disp: 90 tablet, Rfl: 3  •  cetirizine (zyrTEC) 10 MG tablet, Take 10 mg by mouth Daily., Disp: , Rfl:   •  dapagliflozin (FARXIGA) 5 MG tablet tablet, Take 5 mg by mouth Daily., Disp: , Rfl:   •  Lancets (FREESTYLE) lancets, , Disp: , Rfl:   •  levocetirizine (XYZAL) 5 MG tablet, Take 5 mg by mouth Every Evening., Disp: , Rfl:   •  loratadine (CLARITIN) 10 MG tablet, Take 10 mg by mouth Daily., Disp: , Rfl:   •  meloxicam (MOBIC) 15 MG tablet, As Needed., Disp: , Rfl:   •  metFORMIN (GLUCOPHAGE) 1000 MG tablet, Take 1,000 mg by mouth 2 (Two) Times a Day With Meals., Disp: , Rfl:   •  rosuvastatin (CRESTOR) 20 MG tablet, Take 20 mg by mouth Daily., Disp: , Rfl:   •  vitamin B-12 (CYANOCOBALAMIN) 500 MCG tablet, Take 500 mcg by mouth Daily., Disp: , Rfl:      Review of System  Review of Systems   Constitutional: Negative.    Eyes: Negative.    Respiratory: Negative.    Gastrointestinal: Negative.    Skin: Negative.    Psychiatric/Behavioral: Negative.      I have reviewed and confirmed the accuracy of the ROS as documented by the MA/LPN/RN Rashad Macias MD    Vitals:    09/08/20 1328   BP: 110/80   Pulse: 67   Resp: 16   Temp: 98.2 °F (36.8 °C)   SpO2: 97%     Body mass index is 28.25 kg/m².    Objective     Office Visit on 09/08/2020   Component Date Value Ref Range Status   • Glucose 09/08/2020 112* 65 - 99 mg/dL Final   • BUN 09/08/2020 14  6 - 24 mg/dL Final   • Creatinine 09/08/2020 1.13  0.76 - 1.27 mg/dL Final   • eGFR Non  Am 09/08/2020 71  >59 mL/min/1.73 Final   • eGFR African Am 09/08/2020 82  >59 mL/min/1.73 Final   • BUN/Creatinine Ratio 09/08/2020 12  9 - 20 Final   • Sodium 09/08/2020 141  134 - 144 mmol/L Final   • Potassium 09/08/2020 4.1  3.5 - 5.2 mmol/L Final   • Chloride 09/08/2020 102  96 - 106 mmol/L Final   • Total CO2 09/08/2020 26  20 - 29 mmol/L  Final   • Calcium 09/08/2020 9.7  8.7 - 10.2 mg/dL Final   • Total Protein 09/08/2020 6.9  6.0 - 8.5 g/dL Final   • Albumin 09/08/2020 4.9  3.8 - 4.9 g/dL Final   • Globulin 09/08/2020 2.0  1.5 - 4.5 g/dL Final   • A/G Ratio 09/08/2020 2.5* 1.2 - 2.2 Final   • Total Bilirubin 09/08/2020 0.7  0.0 - 1.2 mg/dL Final   • Alkaline Phosphatase 09/08/2020 78  39 - 117 IU/L Final   • AST (SGOT) 09/08/2020 21  0 - 40 IU/L Final   • ALT (SGPT) 09/08/2020 27  0 - 44 IU/L Final   • Hemoglobin A1C 09/08/2020 7.6* 4.8 - 5.6 % Final    Comment:          Prediabetes: 5.7 - 6.4           Diabetes: >6.4           Glycemic control for adults with diabetes: <7.0     • Total Cholesterol 09/08/2020 133  100 - 199 mg/dL Final   • Triglycerides 09/08/2020 173* 0 - 149 mg/dL Final   • HDL Cholesterol 09/08/2020 32* >39 mg/dL Final   • VLDL Cholesterol Bryce 09/08/2020 30  5 - 40 mg/dL Final   • LDL Chol Calc (NIH) 09/08/2020 71  0 - 99 mg/dL Final   • WBC 09/08/2020 6.2  3.4 - 10.8 x10E3/uL Final   • RBC 09/08/2020 5.48  4.14 - 5.80 x10E6/uL Final   • Hemoglobin 09/08/2020 15.8  13.0 - 17.7 g/dL Final   • Hematocrit 09/08/2020 47.5  37.5 - 51.0 % Final   • MCV 09/08/2020 87  79 - 97 fL Final   • MCH 09/08/2020 28.8  26.6 - 33.0 pg Final   • MCHC 09/08/2020 33.3  31.5 - 35.7 g/dL Final   • RDW 09/08/2020 14.1  11.6 - 15.4 % Final   • Platelets 09/08/2020 187  150 - 450 x10E3/uL Final       Physical Exam  Physical Exam   Constitutional: He is oriented to person, place, and time. He appears well-developed and well-nourished.   Eyes: Conjunctivae and EOM are normal.   Neck: Normal range of motion. Neck supple.   Cardiovascular: Normal rate, regular rhythm and normal heart sounds.   Pulmonary/Chest: Effort normal and breath sounds normal.   Abdominal: Soft. Bowel sounds are normal.   Musculoskeletal: Normal range of motion.   Neurological: He is alert and oriented to person, place, and time.   Skin: Skin is warm and dry.   Psychiatric: He has a  normal mood and affect. His behavior is normal.   Nursing note and vitals reviewed.      Assessment/Plan      Patient presents today for follow-up of hypertension.  He relates he is taking his medication as prescribed and is feeling fine.  His blood pressures noted to be 120/80 today in his left arm sitting position standard cuff ..  In the interim of visits the patient has been seen by Dr. Bustillo pulmonologist was following him for a lung lesion.  The lesion has not increased in size and Dr. Bustillo is continuing to watch it at this point.  It is anticipated that future pulmonary function tests will be utilized to help monitor his pulmonary status.  So far x-rays have not shown any evidence of malignancy.    The patient is also been followed by Dr. More, orthopedic surgeon was anticipating right shoulder replacement in the next several weeks.  Patient previously had a left shoulder replaced and is doing relatively well  his recovery.    The patient did not bring his glucometer with him today.  We'll evaluate his diabetes status with a hemoglobin A1c.        Noah was seen today for hypertension.    Diagnoses and all orders for this visit:    Type 2 diabetes mellitus with hypoglycemia without coma, with long-term current use of insulin (CMS/[facility])  -     Comprehensive metabolic panel  -     Hemoglobin A1c  -     Lipid panel  -     CBC No Differential    Mixed hyperlipidemia    Essential hypertension          Plan:  Follow-up in 5-6 months  #2.)  Continue current medication.       Rashad Macias MD  09/08/2020

## 2020-09-29 DIAGNOSIS — E11.649 TYPE 2 DIABETES MELLITUS WITH HYPOGLYCEMIA WITHOUT COMA, WITH LONG-TERM CURRENT USE OF INSULIN (HCC): Primary | ICD-10-CM

## 2020-09-29 DIAGNOSIS — Z79.4 TYPE 2 DIABETES MELLITUS WITH HYPOGLYCEMIA WITHOUT COMA, WITH LONG-TERM CURRENT USE OF INSULIN (HCC): Primary | ICD-10-CM

## 2020-10-07 ENCOUNTER — TELEPHONE (OUTPATIENT)
Dept: FAMILY MEDICINE CLINIC | Facility: CLINIC | Age: 60
End: 2020-10-07

## 2020-10-07 NOTE — TELEPHONE ENCOUNTER
PATIENT WAS CURIOUS WHY DR REFERRED TO DR SMITH INSTEAD OF DR PARK THAT HE PREVIOUSLY SAW. NOT OPPOSED TO IT, JUST CURIOUS OF IF DR SMITH IS BETTER FIT OR IF PREVIOUS DR RETIRED OR AN INSURANCE NETWORK ISSUE     [phone]

## 2020-10-08 ENCOUNTER — TELEPHONE (OUTPATIENT)
Dept: FAMILY MEDICINE CLINIC | Facility: CLINIC | Age: 60
End: 2020-10-08

## 2020-10-08 NOTE — TELEPHONE ENCOUNTER
Spoke with pt of what Dr. Macias said and he is going to call Dr. DUBON and see if he can schedule with him if not he is going to call us back to let us know. Hub please inform patient

## 2020-10-08 NOTE — TELEPHONE ENCOUNTER
PT CALLED RETURNING A PHONE CALL TO KLAUDIA, STATED THAT KLAUDIA WAS NEEDING TO GET SOME INFORMATION FROM HIM ABOUT A REFERRAL.     ATTEMPTED TO WARM TRANSFER, WAS DECLINED AND TOLD TO TAKE A MESSAGE      PLEASE ADVISE     PT CALL BACK   [phone]

## 2020-10-26 RX ORDER — BISOPROLOL FUMARATE 5 MG/1
TABLET, FILM COATED ORAL
Qty: 90 TABLET | Refills: 0 | Status: SHIPPED | OUTPATIENT
Start: 2020-10-26 | End: 2021-01-14

## 2020-10-28 ENCOUNTER — HOSPITAL ENCOUNTER (OUTPATIENT)
Dept: GENERAL RADIOLOGY | Facility: HOSPITAL | Age: 60
Discharge: HOME | End: 2020-10-28
Attending: ORTHOPAEDIC SURGERY

## 2020-10-28 LAB
ANION GAP SERPL CALC-SCNC: 15 MMOL/L (ref 8–19)
BASOPHILS # BLD AUTO: 0.05 10*3/UL (ref 0–0.2)
BASOPHILS NFR BLD AUTO: 0.9 % (ref 0–3)
BUN SERPL-MCNC: 14 MG/DL (ref 5–25)
BUN/CREAT SERPL: 12 {RATIO} (ref 6–20)
CALCIUM SERPL-MCNC: 9.3 MG/DL (ref 8.7–10.4)
CHLORIDE SERPL-SCNC: 103 MMOL/L (ref 99–111)
CONV ABS IMM GRAN: 0.03 10*3/UL (ref 0–0.2)
CONV CO2: 27 MMOL/L (ref 22–32)
CONV IMMATURE GRAN: 0.5 % (ref 0–1.8)
CREAT UR-MCNC: 1.16 MG/DL (ref 0.7–1.2)
DEPRECATED RDW RBC AUTO: 41.7 FL (ref 35.1–43.9)
EOSINOPHIL # BLD AUTO: 0.05 10*3/UL (ref 0–0.7)
EOSINOPHIL # BLD AUTO: 0.9 % (ref 0–7)
ERYTHROCYTE [DISTWIDTH] IN BLOOD BY AUTOMATED COUNT: 13.4 % (ref 11.6–14.4)
EST. AVERAGE GLUCOSE BLD GHB EST-MCNC: 174 MG/DL
GFR SERPLBLD BASED ON 1.73 SQ M-ARVRAT: >60 ML/MIN/{1.73_M2}
GLUCOSE SERPL-MCNC: 139 MG/DL (ref 70–99)
HBA1C MFR BLD: 7.7 % (ref 3.5–5.7)
HCT VFR BLD AUTO: 45.8 % (ref 42–52)
HGB BLD-MCNC: 15.7 G/DL (ref 14–18)
LYMPHOCYTES # BLD AUTO: 1.72 10*3/UL (ref 1–5)
LYMPHOCYTES NFR BLD AUTO: 30.4 % (ref 20–45)
MCH RBC QN AUTO: 29.6 PG (ref 27–31)
MCHC RBC AUTO-ENTMCNC: 34.3 G/DL (ref 33–37)
MCV RBC AUTO: 86.4 FL (ref 80–96)
MONOCYTES # BLD AUTO: 0.44 10*3/UL (ref 0.2–1.2)
MONOCYTES NFR BLD AUTO: 7.8 % (ref 3–10)
NEUTROPHILS # BLD AUTO: 3.37 10*3/UL (ref 2–8)
NEUTROPHILS NFR BLD AUTO: 59.5 % (ref 30–85)
NRBC CBCN: 0 % (ref 0–0.7)
OSMOLALITY SERPL CALC.SUM OF ELEC: 295 MOSM/KG (ref 273–304)
PLATELET # BLD AUTO: 185 10*3/UL (ref 130–400)
PMV BLD AUTO: 10.8 FL (ref 9.4–12.4)
POTASSIUM SERPL-SCNC: 4.3 MMOL/L (ref 3.5–5.3)
RBC # BLD AUTO: 5.3 10*6/UL (ref 4.7–6.1)
SODIUM SERPL-SCNC: 141 MMOL/L (ref 135–147)
WBC # BLD AUTO: 5.66 10*3/UL (ref 4.8–10.8)

## 2020-10-29 RX ORDER — DAPAGLIFLOZIN 5 MG/1
5 TABLET, FILM COATED ORAL DAILY
Qty: 30 TABLET | Refills: 5 | Status: SHIPPED | OUTPATIENT
Start: 2020-10-29 | End: 2021-03-10 | Stop reason: CLARIF

## 2020-11-04 ENCOUNTER — TELEPHONE (OUTPATIENT)
Dept: FAMILY MEDICINE CLINIC | Facility: CLINIC | Age: 60
End: 2020-11-04

## 2020-11-04 DIAGNOSIS — Z79.4 TYPE 2 DIABETES MELLITUS WITH HYPERGLYCEMIA, WITH LONG-TERM CURRENT USE OF INSULIN (HCC): Primary | ICD-10-CM

## 2020-11-04 DIAGNOSIS — E11.65 TYPE 2 DIABETES MELLITUS WITH HYPERGLYCEMIA, WITH LONG-TERM CURRENT USE OF INSULIN (HCC): Primary | ICD-10-CM

## 2020-11-04 NOTE — TELEPHONE ENCOUNTER
Pt's wife called stating will be seen at Dr Mobley's (endocrinology) office soon, they will need the referral, last office notes and labs.  Fax: [phone]  Tel: [phone]

## 2020-11-25 DIAGNOSIS — Z79.4 TYPE 2 DIABETES MELLITUS WITH HYPERGLYCEMIA, WITH LONG-TERM CURRENT USE OF INSULIN (HCC): Primary | ICD-10-CM

## 2020-11-25 DIAGNOSIS — E11.65 TYPE 2 DIABETES MELLITUS WITH HYPERGLYCEMIA, WITH LONG-TERM CURRENT USE OF INSULIN (HCC): Primary | ICD-10-CM

## 2020-12-09 ENCOUNTER — OFFICE VISIT (OUTPATIENT)
Dept: FAMILY MEDICINE CLINIC | Facility: CLINIC | Age: 60
End: 2020-12-09

## 2020-12-09 VITALS
TEMPERATURE: 98.9 F | WEIGHT: 223 LBS | HEIGHT: 74 IN | HEART RATE: 61 BPM | BODY MASS INDEX: 28.62 KG/M2 | OXYGEN SATURATION: 99 % | SYSTOLIC BLOOD PRESSURE: 130 MMHG | DIASTOLIC BLOOD PRESSURE: 80 MMHG

## 2020-12-09 DIAGNOSIS — E11.65 TYPE 2 DIABETES MELLITUS WITH HYPERGLYCEMIA, WITH LONG-TERM CURRENT USE OF INSULIN (HCC): Primary | ICD-10-CM

## 2020-12-09 DIAGNOSIS — R91.8 LUNG MASS: ICD-10-CM

## 2020-12-09 DIAGNOSIS — I10 ESSENTIAL HYPERTENSION: ICD-10-CM

## 2020-12-09 DIAGNOSIS — Z79.4 TYPE 2 DIABETES MELLITUS WITH HYPERGLYCEMIA, WITH LONG-TERM CURRENT USE OF INSULIN (HCC): Primary | ICD-10-CM

## 2020-12-09 PROCEDURE — 99214 OFFICE O/P EST MOD 30 MIN: CPT | Performed by: INTERNAL MEDICINE

## 2020-12-09 RX ORDER — ASPIRIN 81 MG/1
TABLET, COATED ORAL
COMMUNITY
Start: 2020-11-11 | End: 2021-03-10

## 2020-12-09 RX ORDER — NAPROXEN 500 MG/1
TABLET ORAL
COMMUNITY
Start: 2020-11-11 | End: 2020-12-11

## 2020-12-11 ENCOUNTER — OFFICE VISIT (OUTPATIENT)
Dept: CARDIOLOGY | Facility: CLINIC | Age: 60
End: 2020-12-11

## 2020-12-11 VITALS
WEIGHT: 232 LBS | DIASTOLIC BLOOD PRESSURE: 83 MMHG | SYSTOLIC BLOOD PRESSURE: 121 MMHG | HEART RATE: 64 BPM | HEIGHT: 74 IN | BODY MASS INDEX: 29.77 KG/M2

## 2020-12-11 DIAGNOSIS — I10 ESSENTIAL HYPERTENSION: Primary | ICD-10-CM

## 2020-12-11 DIAGNOSIS — R00.2 PALPITATIONS: ICD-10-CM

## 2020-12-11 DIAGNOSIS — G47.33 OBSTRUCTIVE SLEEP APNEA SYNDROME: ICD-10-CM

## 2020-12-11 PROCEDURE — 93000 ELECTROCARDIOGRAM COMPLETE: CPT | Performed by: NURSE PRACTITIONER

## 2020-12-11 PROCEDURE — 99213 OFFICE O/P EST LOW 20 MIN: CPT | Performed by: NURSE PRACTITIONER

## 2020-12-11 NOTE — PROGRESS NOTES
Subjective:        Noah Flannery Jr. is a 60 y.o. male who here for follow up    No chief complaint on file.    Follow-up for hypertension    HPI     Noah Flannery is a 60-year-old male, who is new to me.  He has a history which includes JACQUELYN, hypertension, hyperlipidemia, hyperparathyroidism, diabetes mellitus, and history of palpitations.  Lipid panel on 9/8/2020 revealed , HDL 32, LDL 71, and triglycerides 173.  States his primary care physician manages his cholesterol.  Just test on 11/14/2019 indicated a normal myocardial perfusion study.  It was a low risk study.  Echo on 1/13/2019 revealed EF 58%, LAC size is borderline dilated, saline bubble study negative for PFO, and no evidence of pericardial effusion.  He denies chest pain, shortness of breath, syncope near syncope.    The following portions of the patient's history were reviewed and updated as appropriate: allergies, current medications, past family history, past medical history, past social history, past surgical history and problem list.    Past Medical History:   Diagnosis Date   • Broken ankle    • Broken ribs     right   • Colon polyp    • Diabetes mellitus (CMS/HCC)    • Hyperlipidemia    • Hypertension    • Sleep apnea          reports that he has never smoked. He has never used smokeless tobacco. He reports current alcohol use. He reports that he does not use drugs.     Family History   Problem Relation Age of Onset   • Diabetes Mother    • Hypertension Mother    • Heart disease Mother    • Lung cancer Mother    • Liver cancer Mother    • Heart disease Father    • Lung cancer Father    • Colon cancer Neg Hx    • Colon polyps Neg Hx        ROS     Review of Systems  Constitutional: No wt loss, fever, fatigue  Gastrointestinal: No nausea, abdominal pain  Behavioral/Psych: No insomnia or anxiety  Cardiovascular : Denies chest pain, shortness of breath, syncope and near syncope.      Objective:           Vitals signs and nursing note  reviewed.   Constitutional:       Appearance: Well-developed.   HENT:      Head: Normocephalic.      Right Ear: External ear normal.      Left Ear: External ear normal.   Neck:      Musculoskeletal: Normal range of motion.      Vascular: No JVD.   Pulmonary:      Effort: Pulmonary effort is normal. No respiratory distress.      Breath sounds: Normal breath sounds. No stridor. No rales.   Cardiovascular:      Normal rate. Regular rhythm.      No gallop.   Pulses:     Intact distal pulses.   Abdominal:      General: Bowel sounds are normal. There is no distension.      Palpations: Abdomen is soft.      Tenderness: There is no abdominal tenderness. There is no guarding.   Musculoskeletal: Normal range of motion.         General: No tenderness.   Skin:     General: Skin is warm.   Neurological:      Mental Status: Alert and oriented to person, place, and time.      Deep Tendon Reflexes: Reflexes are normal and symmetric.   Psychiatric:         Judgment: Judgment normal.           ECG 12 Lead    Date/Time: 12/11/2020 10:52 AM  Performed by: Samara Rankin APRN  Authorized by: Samara Rankin APRN   Comparison: compared with previous ECG from 10/24/2019  Similar to previous ECG  Rhythm: sinus rhythm          Interpretation Summary       · Moderate risk for ischemic heart disease.  · Findings consistent with an abnormal ECG stress test.  · Left ventricular ejection fraction is normal (Calculated EF = 57%).  · Myocardial perfusion imaging indicates a normal myocardial perfusion study with no evidence of ischemia.  · Impressions are consistent with a low risk study.     Asymptomatic for chest pain.  ECG is positive for ischemia:  2 mm upslope to horizontal ST segment Depression Inferior Leads  1 mm horizontal  ST segment Depression Lateral Lead V6  BP response: appropriate for Beta-blocker therapy.  Ectopy: Multifocal Bigeminy PVC's with exercise and occasional PVC's in recovery.  Exercise Tolerance:   good     Oliver treadmill score -1   Estimates an annual cardiovascular mortality of  1 %  And of 5-year survival of 91 % . Using the Duke score there is an intermediate  probability of angiographic coronary disease     Supervised by:  Samara OLEA.               Interpretation Summary    · Saline bubble study neg for PFO  · Left atrial cavity size is borderline dilated.  · Calculated EF = 58%.  · There is no evidence of pericardial effusion.                Current Outpatient Medications:   •  bisoprolol (ZEBeta) 5 MG tablet, TAKE ONE TABLET BY MOUTH DAILY, Disp: 90 tablet, Rfl: 0  •  cetirizine (zyrTEC) 10 MG tablet, Take 10 mg by mouth Daily., Disp: , Rfl:   •  dapagliflozin (Farxiga) 5 MG tablet tablet, Take 1 tablet by mouth Daily., Disp: 30 tablet, Rfl: 5  •  metFORMIN (GLUCOPHAGE) 500 MG tablet, Take 1 tablet by mouth 2 (Two) Times a Day With Meals., Disp: 180 tablet, Rfl: 2  •  rosuvastatin (CRESTOR) 20 MG tablet, Take 20 mg by mouth Daily., Disp: , Rfl:   •  vitamin B-12 (CYANOCOBALAMIN) 500 MCG tablet, Take 500 mcg by mouth Daily., Disp: , Rfl:   •  Aspirin Low Dose 81 MG EC tablet, , Disp: , Rfl:   •  glucose blood test strip, 1 each by Other route As Needed. Use as instructed, Disp: , Rfl:   •  glucose blood test strip, 1 each by Other route As Needed. Use as instructed, Disp: , Rfl:   •  Lancets (FREESTYLE) lancets, , Disp: , Rfl:   •  levocetirizine (XYZAL) 5 MG tablet, Take 5 mg by mouth Every Evening., Disp: , Rfl:   •  loratadine (CLARITIN) 10 MG tablet, Take 10 mg by mouth Daily., Disp: , Rfl:   •  meloxicam (MOBIC) 15 MG tablet, As Needed., Disp: , Rfl:      Assessment:        Patient Active Problem List   Diagnosis   • Personal history of colonic polyps   • Palpitations   • Hypertension   • Hypercalcemia   • Hyperlipidemia   • Hyperparathyroidism (CMS/HCC)   • JACQUELYN (obstructive sleep apnea)               Plan:   1.  Hypertension: He states his blood pressure is controlled while he is at  home.  Continue for bisoprolol.     Educated patient on exercising for at least 30 minutes a day for 2 to 3 days a week. Importance of controlling hypertension and blood pressure checkup on the regular basis has been explained. Hypertension as a silent killer has been discussed. Risk reduction of the weight and regular exercises to control the hypertension has been explained.    2.  Hyperlipidemia: Previous lipid panel as above.  He states his primary care physician manages his cholesterol.  Continue rouvastatin 20 mg.    Risk of the hyperlipidemia, importance of the treatment has been explained. Pros and cons of the statins has been explained. Regular blood workup as well as side effects including the liver failure, myelopathy death has been explained.      3.  JACQUELYN: CPAP compliant    4.  Palpitations: Controlled on beta-blocker.       No diagnosis found.    There are no diagnoses linked to this encounter.    COUNSELING: Done    Noah Flannery Jr.Counseling was given to patient for the following topics: diagnostic results, risk factor reductions, impressions, risks and benefits of treatment options and importance of treatment compliance .       SMOKING COUNSELING: Denies    We will follow-up in 1 year, unless he needs to be seen sooner.    Sincerely,   EMMIE Hansen  [facility]  12/11/20  10:45 EST     EMR Dragon/Transcription disclaimer:   Much of this encounter note is an electronic transcription/translation of spoken language to printed text. The electronic translation of spoken language may permit erroneous, or at times, nonsensical words or phrases to be inadvertently transcribed; Although I have reviewed the note for such errors, some may still exist.

## 2020-12-30 ENCOUNTER — TELEPHONE (OUTPATIENT)
Dept: FAMILY MEDICINE CLINIC | Facility: CLINIC | Age: 60
End: 2020-12-30

## 2020-12-30 DIAGNOSIS — E11.65 TYPE 2 DIABETES MELLITUS WITH HYPERGLYCEMIA, WITH LONG-TERM CURRENT USE OF INSULIN (HCC): ICD-10-CM

## 2020-12-30 DIAGNOSIS — Z79.4 TYPE 2 DIABETES MELLITUS WITH HYPERGLYCEMIA, WITH LONG-TERM CURRENT USE OF INSULIN (HCC): ICD-10-CM

## 2020-12-30 RX ORDER — LANCETS 28 GAUGE
EACH MISCELLANEOUS
Qty: 100 EACH | Refills: 3 | Status: SHIPPED | OUTPATIENT
Start: 2020-12-30 | End: 2022-01-13

## 2020-12-30 NOTE — PROGRESS NOTES
12/09/2020    CC: Follow-up, Hypertension, and Diabetes  .        HPI  Hypertension  This is a chronic problem. The current episode started more than 1 year ago. The problem has been waxing and waning since onset. The problem is controlled.   Diabetes         Subjective   Noah Flannery Jr. is a 60 y.o. male.      The following portions of the patient's history were reviewed and updated as appropriate: allergies, current medications, past family history and problem list.    Problem List  Patient Active Problem List   Diagnosis   • Personal history of colonic polyps   • Palpitations   • Hypertension   • Hypercalcemia   • Hyperlipidemia   • Hyperparathyroidism (CMS/HCC)   • JACQUELYN (obstructive sleep apnea)       Past Medical History  Past Medical History:   Diagnosis Date   • Broken ankle    • Broken ribs     right   • Colon polyp    • Diabetes mellitus (CMS/HCC)    • Hyperlipidemia    • Hypertension    • Sleep apnea        Surgical History  Past Surgical History:   Procedure Laterality Date   • ANKLE SURGERY     • APPENDECTOMY     • COLONOSCOPY     • COLONOSCOPY N/A 7/30/2019    Procedure: COLONOSCOPY to cecum into TI with cold biopsy and cold snare polypectomies;  Surgeon: Thomas Concepcion MD;  Location: [facility] ENDOSCOPY;  Service: Gastroenterology   • HERNIA REPAIR     • PARATHYROID GLAND SURGERY     • TOTAL SHOULDER REPLACEMENT Left    • TOTAL SHOULDER REPLACEMENT Right 11/18/2020       Family History  Family History   Problem Relation Age of Onset   • Diabetes Mother    • Hypertension Mother    • Heart disease Mother    • Lung cancer Mother    • Liver cancer Mother    • Heart disease Father    • Lung cancer Father    • Colon cancer Neg Hx    • Colon polyps Neg Hx        Social History  Social History    Tobacco Use      Smoking status: Never Smoker      Smokeless tobacco: Never Used       Is the Patient a current tobacco user? No    Allergies  No Known Allergies    Current Medications    Current  Outpatient Medications:   •  Aspirin Low Dose 81 MG EC tablet, , Disp: , Rfl:   •  bisoprolol (ZEBeta) 5 MG tablet, TAKE ONE TABLET BY MOUTH DAILY, Disp: 90 tablet, Rfl: 0  •  cetirizine (zyrTEC) 10 MG tablet, Take 10 mg by mouth Daily., Disp: , Rfl:   •  dapagliflozin (Farxiga) 5 MG tablet tablet, Take 1 tablet by mouth Daily., Disp: 30 tablet, Rfl: 5  •  levocetirizine (XYZAL) 5 MG tablet, Take 5 mg by mouth Every Evening., Disp: , Rfl:   •  loratadine (CLARITIN) 10 MG tablet, Take 10 mg by mouth Daily., Disp: , Rfl:   •  meloxicam (MOBIC) 15 MG tablet, As Needed., Disp: , Rfl:   •  rosuvastatin (CRESTOR) 20 MG tablet, Take 20 mg by mouth Daily., Disp: , Rfl:   •  vitamin B-12 (CYANOCOBALAMIN) 500 MCG tablet, Take 500 mcg by mouth Daily., Disp: , Rfl:   •  glucose blood test strip, 1 each by Other route As Needed. Use as instructed, Disp: , Rfl:   •  glucose blood test strip, 1 each by Other route Daily. Use as instructed, Disp: 50 each, Rfl: 6  •  Lancets (freestyle) lancets, Use 1 daily to help monitor glucose, Disp: 100 each, Rfl: 3  •  metFORMIN (GLUCOPHAGE) 500 MG tablet, Take 2 tablets in the morning and 1 in the afternoon, Disp: 180 tablet, Rfl: 2     Review of System  Review of Systems   Constitutional: Negative.    HENT: Negative.    Eyes: Negative.    Respiratory: Negative.      I have reviewed and confirmed the accuracy of the ROS as documented by the MA/LPN/RN Rashad Macias MD    Vitals:    12/09/20 1050   BP: 130/80   Pulse: 61   Temp: 98.9 °F (37.2 °C)   SpO2: 99%     Body mass index is 28.63 kg/m².    Objective     No visits with results within 30 Day(s) from this visit.   Latest known visit with results is:   Office Visit on 09/08/2020   Component Date Value Ref Range Status   • Glucose 09/08/2020 112* 65 - 99 mg/dL Final   • BUN 09/08/2020 14  6 - 24 mg/dL Final   • Creatinine 09/08/2020 1.13  0.76 - 1.27 mg/dL Final   • eGFR Non  Am 09/08/2020 71  >59 mL/min/1.73 Final   • eGFR African  Am 09/08/2020 82  >59 mL/min/1.73 Final   • BUN/Creatinine Ratio 09/08/2020 12  9 - 20 Final   • Sodium 09/08/2020 141  134 - 144 mmol/L Final   • Potassium 09/08/2020 4.1  3.5 - 5.2 mmol/L Final   • Chloride 09/08/2020 102  96 - 106 mmol/L Final   • Total CO2 09/08/2020 26  20 - 29 mmol/L Final   • Calcium 09/08/2020 9.7  8.7 - 10.2 mg/dL Final   • Total Protein 09/08/2020 6.9  6.0 - 8.5 g/dL Final   • Albumin 09/08/2020 4.9  3.8 - 4.9 g/dL Final   • Globulin 09/08/2020 2.0  1.5 - 4.5 g/dL Final   • A/G Ratio 09/08/2020 2.5* 1.2 - 2.2 Final   • Total Bilirubin 09/08/2020 0.7  0.0 - 1.2 mg/dL Final   • Alkaline Phosphatase 09/08/2020 78  39 - 117 IU/L Final   • AST (SGOT) 09/08/2020 21  0 - 40 IU/L Final   • ALT (SGPT) 09/08/2020 27  0 - 44 IU/L Final   • Hemoglobin A1C 09/08/2020 7.6* 4.8 - 5.6 % Final    Comment:          Prediabetes: 5.7 - 6.4           Diabetes: >6.4           Glycemic control for adults with diabetes: <7.0     • Total Cholesterol 09/08/2020 133  100 - 199 mg/dL Final   • Triglycerides 09/08/2020 173* 0 - 149 mg/dL Final   • HDL Cholesterol 09/08/2020 32* >39 mg/dL Final   • VLDL Cholesterol Bryce 09/08/2020 30  5 - 40 mg/dL Final   • LDL Chol Calc (NIH) 09/08/2020 71  0 - 99 mg/dL Final   • WBC 09/08/2020 6.2  3.4 - 10.8 x10E3/uL Final   • RBC 09/08/2020 5.48  4.14 - 5.80 x10E6/uL Final   • Hemoglobin 09/08/2020 15.8  13.0 - 17.7 g/dL Final   • Hematocrit 09/08/2020 47.5  37.5 - 51.0 % Final   • MCV 09/08/2020 87  79 - 97 fL Final   • MCH 09/08/2020 28.8  26.6 - 33.0 pg Final   • MCHC 09/08/2020 33.3  31.5 - 35.7 g/dL Final   • RDW 09/08/2020 14.1  11.6 - 15.4 % Final   • Platelets 09/08/2020 187  150 - 450 x10E3/uL Final       Physical Exam  Physical Exam  Constitutional:       Appearance: Normal appearance.   HENT:      Head: Normocephalic and atraumatic.   Neck:      Musculoskeletal: Normal range of motion and neck supple.   Cardiovascular:      Rate and Rhythm: Normal rate and regular rhythm.    Neurological:      Mental Status: He is alert.         Assessment/Plan    This patient presents today for follow-up of hypertension and review of his shoulder.  He had right shoulder surgery done by Dr. Choi several weeks ago.  Patient is in rehabilitation and is doing well.      He was recently seen by Dr. Farfan, pulmonologist and had a pulmonary function test done which was normal.  But Dr. Delaney is also helping to manage evaluation of a spot on the patient's left lower long Dr. Fuentes does not feel this is cancerous but with intended to watch it.    Patient has a history of diabetes mellitus type 2 but has had trouble getting him with his previous endocrinologist who is outside the [facility] system.  We will make a referral to Dr. diaz to take over management.  The patient's last hemoglobin A1c was elevated at 7.6 done just shy of 3 months ago. He is currently on metformin 500 mg 1 in the morning and 1 in the afternoon.          Diagnoses and all orders for this visit:    1. Type 2 diabetes mellitus with hyperglycemia, with long-term current use of insulin (CMS/[facility]) (Primary)established problem, poorly controlled, mild progression  We've asked the patient to increase his metformin to 2 in the morning and 1 in the afternoon.      2. Essential hypertension: Established problem controlled    3. Lung mass: Established problem stable             Rashad Macias MD  12/09/2020

## 2020-12-30 NOTE — TELEPHONE ENCOUNTER
PATIENT CALLING BACK. PATIENT STATED SOMEONE CALLED HIM FROM THE OFFICE AND DIDN'T LEAVE A VOICEMAIL.     CALL BACK: [phone]

## 2020-12-30 NOTE — TELEPHONE ENCOUNTER
Patient called in requesting a new prescription for the Metformin showing the change in direction of taking it.    Patient also said he needs a re-fill for     Lancets (FREESTYLE) lancets    [place] [id number] yeppt    Best call back # [phone]

## 2021-01-14 RX ORDER — BISOPROLOL FUMARATE 5 MG/1
TABLET, FILM COATED ORAL
Qty: 90 TABLET | Refills: 1 | Status: SHIPPED | OUTPATIENT
Start: 2021-01-14 | End: 2021-07-13

## 2021-03-04 NOTE — PROGRESS NOTES
Subjective   Noah Flannery Jr. is a 60 y.o. male.     New pt ref by dr Rashad Macias for dm 2/ testing bs1  x day / last dm eye exam 2020/ last dm foot exam today with dr Duffy      Patient is a 60-year-old male who was referred for diabetes consultation by Dr. Macias    He has known diabetes mellitus since 2016.  He is on Metformin 500 mg 2 tablets every morning and 1 tablet every evening and Farxiga 5 mg once a day.  Insurance will no longer cover Farxiga.  He checks his blood sugar once a day and blood sugar after his lunch is 131-178.  He denies hypoglycemic episodes.  He has gained 5 pounds in the past year.  His last meal was at 8 AM.    His last eye examination was in July 2020.  He denies eye complaints.  He has no retinopathy.  He has occasional numbness in his feet but no pain.  He denies associated nephropathy.    He has hyperlipidemia is on Crestor 20 mg/day.  He denies myalgia.    He had right superior and left superior parathyroidectomy in 2017 by Dr. Dumont.  Pathology showed parathyroid adenoma.  He has no recurrence of hypercalcemia since then.  He had passed 2 kidney stones before the parathyroid surgery.      The following portions of the patient's history were reviewed and updated as appropriate: allergies, current medications, past family history, past medical history, past social history, past surgical history and problem list.    Review of Systems   Respiratory: Negative for chest tightness and shortness of breath.    Cardiovascular: Negative for chest pain and palpitations.   Gastrointestinal: Negative.    Genitourinary: Negative.    Musculoskeletal: Negative for myalgias.   Neurological: Negative for weakness and numbness.   Hematological: Negative for adenopathy. Does not bruise/bleed easily.     Objective      Vitals:    03/10/21 1230   BP: 110/62   BP Location: Right arm   Patient Position: Sitting   Cuff Size: Large Adult   Pulse: 62   SpO2: 97%   Weight: 104 kg (229 lb 3.2 oz)  "  Height: 188 cm (74.02\")     Physical Exam  Constitutional:       General: He is not in acute distress.     Appearance: Normal appearance. He is obese. He is not ill-appearing, toxic-appearing or diaphoretic.   Eyes:      General: No scleral icterus.        Right eye: No discharge.         Left eye: No discharge.      Extraocular Movements: Extraocular movements intact.      Conjunctiva/sclera: Conjunctivae normal.   Neck:      Vascular: No carotid bruit.   Cardiovascular:      Rate and Rhythm: Normal rate and regular rhythm.      Heart sounds: Normal heart sounds. No murmur. No friction rub. No gallop.    Pulmonary:      Effort: No respiratory distress.      Breath sounds: Normal breath sounds. No stridor. No wheezing, rhonchi or rales.   Chest:      Chest wall: No tenderness.   Abdominal:      General: Bowel sounds are normal. There is no distension.      Palpations: Abdomen is soft.   Musculoskeletal:         General: Normal range of motion.      Cervical back: Neck supple. No rigidity or tenderness.      Comments: No plantar ulcers   Lymphadenopathy:      Cervical: No cervical adenopathy.   Skin:     General: Skin is warm and dry.   Neurological:      General: No focal deficit present.      Mental Status: He is alert and oriented to person, place, and time.      Comments: Intact light touch in lower extremities       Office Visit on 09/08/2020   Component Date Value Ref Range Status   • Glucose 09/08/2020 112* 65 - 99 mg/dL Final   • BUN 09/08/2020 14  6 - 24 mg/dL Final   • Creatinine 09/08/2020 1.13  0.76 - 1.27 mg/dL Final   • eGFR Non  Am 09/08/2020 71  >59 mL/min/1.73 Final   • eGFR African Am 09/08/2020 82  >59 mL/min/1.73 Final   • BUN/Creatinine Ratio 09/08/2020 12  9 - 20 Final   • Sodium 09/08/2020 141  134 - 144 mmol/L Final   • Potassium 09/08/2020 4.1  3.5 - 5.2 mmol/L Final   • Chloride 09/08/2020 102  96 - 106 mmol/L Final   • Total CO2 09/08/2020 26  20 - 29 mmol/L Final   • Calcium " 09/08/2020 9.7  8.7 - 10.2 mg/dL Final   • Total Protein 09/08/2020 6.9  6.0 - 8.5 g/dL Final   • Albumin 09/08/2020 4.9  3.8 - 4.9 g/dL Final   • Globulin 09/08/2020 2.0  1.5 - 4.5 g/dL Final   • A/G Ratio 09/08/2020 2.5* 1.2 - 2.2 Final   • Total Bilirubin 09/08/2020 0.7  0.0 - 1.2 mg/dL Final   • Alkaline Phosphatase 09/08/2020 78  39 - 117 IU/L Final   • AST (SGOT) 09/08/2020 21  0 - 40 IU/L Final   • ALT (SGPT) 09/08/2020 27  0 - 44 IU/L Final   • Hemoglobin A1C 09/08/2020 7.6* 4.8 - 5.6 % Final    Comment:          Prediabetes: 5.7 - 6.4           Diabetes: >6.4           Glycemic control for adults with diabetes: <7.0     • Total Cholesterol 09/08/2020 133  100 - 199 mg/dL Final   • Triglycerides 09/08/2020 173* 0 - 149 mg/dL Final   • HDL Cholesterol 09/08/2020 32* >39 mg/dL Final   • VLDL Cholesterol Bryce 09/08/2020 30  5 - 40 mg/dL Final   • LDL Chol Calc (NIH) 09/08/2020 71  0 - 99 mg/dL Final   • WBC 09/08/2020 6.2  3.4 - 10.8 x10E3/uL Final   • RBC 09/08/2020 5.48  4.14 - 5.80 x10E6/uL Final   • Hemoglobin 09/08/2020 15.8  13.0 - 17.7 g/dL Final   • Hematocrit 09/08/2020 47.5  37.5 - 51.0 % Final   • MCV 09/08/2020 87  79 - 97 fL Final   • MCH 09/08/2020 28.8  26.6 - 33.0 pg Final   • MCHC 09/08/2020 33.3  31.5 - 35.7 g/dL Final   • RDW 09/08/2020 14.1  11.6 - 15.4 % Final   • Platelets 09/08/2020 187  150 - 450 x10E3/uL Final     Assessment/Plan   Diagnoses and all orders for this visit:    1. Type 2 diabetes mellitus without complication, without long-term current use of insulin (CMS/[facility]) (Primary)  -     Comprehensive Metabolic Panel  -     Hemoglobin A1c  -     TSH  -     T4, Free  -     Vitamin B12    2. Essential hypertension  -     Comprehensive Metabolic Panel    3. Hyperlipidemia, unspecified hyperlipidemia type  -     Comprehensive Metabolic Panel  -     Lipid Panel  -     TSH  -     T4, Free    4. History of parathyroidectomy (CMS/[facility])  -     Comprehensive Metabolic Panel  -     Vitamin D 25  Hydroxy    5. JACQUELYN (obstructive sleep apnea)  -     TSH  -     T4, Free      May switch from Farxiga to Jardiance 25 mg/day.  Continue Metformin  Continue Bystolic  Continue Crestor 20 mg/day    Copy of my note sent to Dr. Macias.    RTC 4 mos

## 2021-03-10 ENCOUNTER — OFFICE VISIT (OUTPATIENT)
Dept: ENDOCRINOLOGY | Age: 61
End: 2021-03-10

## 2021-03-10 VITALS
DIASTOLIC BLOOD PRESSURE: 62 MMHG | SYSTOLIC BLOOD PRESSURE: 110 MMHG | HEART RATE: 62 BPM | HEIGHT: 74 IN | BODY MASS INDEX: 29.41 KG/M2 | OXYGEN SATURATION: 97 % | WEIGHT: 229.2 LBS

## 2021-03-10 DIAGNOSIS — E89.2 HISTORY OF PARATHYROIDECTOMY (HCC): ICD-10-CM

## 2021-03-10 DIAGNOSIS — E11.9 TYPE 2 DIABETES MELLITUS WITHOUT COMPLICATION, WITHOUT LONG-TERM CURRENT USE OF INSULIN (HCC): Primary | ICD-10-CM

## 2021-03-10 DIAGNOSIS — G47.33 OSA (OBSTRUCTIVE SLEEP APNEA): ICD-10-CM

## 2021-03-10 DIAGNOSIS — I10 ESSENTIAL HYPERTENSION: ICD-10-CM

## 2021-03-10 DIAGNOSIS — E78.5 HYPERLIPIDEMIA, UNSPECIFIED HYPERLIPIDEMIA TYPE: ICD-10-CM

## 2021-03-10 PROBLEM — Z98.890 HISTORY OF PARATHYROIDECTOMY: Status: ACTIVE | Noted: 2020-09-08

## 2021-03-10 PROBLEM — Z90.89 HISTORY OF PARATHYROIDECTOMY: Status: ACTIVE | Noted: 2020-09-08

## 2021-03-10 PROCEDURE — 99204 OFFICE O/P NEW MOD 45 MIN: CPT | Performed by: INTERNAL MEDICINE

## 2021-03-10 RX ORDER — EMPAGLIFLOZIN 25 MG/1
25 TABLET, FILM COATED ORAL DAILY
Qty: 90 TABLET | Refills: 1 | Status: SHIPPED | OUTPATIENT
Start: 2021-03-10 | End: 2021-09-23 | Stop reason: SDUPTHER

## 2021-03-12 LAB
25(OH)D3+25(OH)D2 SERPL-MCNC: 20.9 NG/ML (ref 30–100)
ALBUMIN SERPL-MCNC: 4.7 G/DL (ref 3.5–5.2)
ALBUMIN/CREAT UR: 20 MG/G CREAT (ref 0–29)
ALBUMIN/GLOB SERPL: 2.2 G/DL
ALP SERPL-CCNC: 80 U/L (ref 39–117)
ALT SERPL-CCNC: 24 U/L (ref 1–41)
AST SERPL-CCNC: 22 U/L (ref 1–40)
BILIRUB SERPL-MCNC: 0.5 MG/DL (ref 0–1.2)
BUN SERPL-MCNC: 13 MG/DL (ref 6–20)
BUN/CREAT SERPL: 13.4 (ref 7–25)
CALCIUM SERPL-MCNC: 9.9 MG/DL (ref 8.6–10.5)
CHLORIDE SERPL-SCNC: 102 MMOL/L (ref 98–107)
CHOLEST SERPL-MCNC: 131 MG/DL (ref 0–200)
CO2 SERPL-SCNC: 28.7 MMOL/L (ref 22–29)
CREAT SERPL-MCNC: 0.97 MG/DL (ref 0.76–1.27)
CREAT UR-MCNC: 81.9 MG/DL
GLOBULIN SER CALC-MCNC: 2.1 GM/DL
GLUCOSE SERPL-MCNC: 126 MG/DL (ref 65–99)
HBA1C MFR BLD: 8 % (ref 4.8–5.6)
HDLC SERPL-MCNC: 35 MG/DL (ref 40–60)
IMP & REVIEW OF LAB RESULTS: NORMAL
LDLC SERPL CALC-MCNC: 69 MG/DL (ref 0–100)
Lab: NORMAL
Lab: NORMAL
MICROALBUMIN UR-MCNC: 16.3 UG/ML
POTASSIUM SERPL-SCNC: 4.2 MMOL/L (ref 3.5–5.2)
PROT SERPL-MCNC: 6.8 G/DL (ref 6–8.5)
SODIUM SERPL-SCNC: 143 MMOL/L (ref 136–145)
T4 FREE SERPL-MCNC: 1.01 NG/DL (ref 0.93–1.7)
TRIGL SERPL-MCNC: 157 MG/DL (ref 0–150)
TSH SERPL DL<=0.005 MIU/L-ACNC: 3.16 UIU/ML (ref 0.27–4.2)
VIT B12 SERPL-MCNC: 621 PG/ML (ref 211–946)
VLDLC SERPL CALC-MCNC: 27 MG/DL (ref 5–40)

## 2021-03-19 ENCOUNTER — HOSPITAL ENCOUNTER (OUTPATIENT)
Dept: VACCINE CLINIC | Facility: HOSPITAL | Age: 61
Discharge: HOME | End: 2021-03-19
Attending: INTERNAL MEDICINE

## 2021-03-24 DIAGNOSIS — E11.65 TYPE 2 DIABETES MELLITUS WITH HYPERGLYCEMIA, WITH LONG-TERM CURRENT USE OF INSULIN (HCC): ICD-10-CM

## 2021-03-24 DIAGNOSIS — Z79.4 TYPE 2 DIABETES MELLITUS WITH HYPERGLYCEMIA, WITH LONG-TERM CURRENT USE OF INSULIN (HCC): ICD-10-CM

## 2021-03-24 RX ORDER — CHOLECALCIFEROL (VITAMIN D3) 25 MCG
1000 CAPSULE ORAL DAILY
Qty: 60 CAPSULE
Start: 2021-03-24

## 2021-04-08 ENCOUNTER — OFFICE VISIT (OUTPATIENT)
Dept: FAMILY MEDICINE CLINIC | Facility: CLINIC | Age: 61
End: 2021-04-08

## 2021-04-08 VITALS
BODY MASS INDEX: 28.47 KG/M2 | SYSTOLIC BLOOD PRESSURE: 110 MMHG | HEIGHT: 74 IN | RESPIRATION RATE: 16 BRPM | WEIGHT: 221.8 LBS | TEMPERATURE: 98 F | HEART RATE: 66 BPM | DIASTOLIC BLOOD PRESSURE: 80 MMHG | OXYGEN SATURATION: 99 %

## 2021-04-08 DIAGNOSIS — E11.65 TYPE 2 DIABETES MELLITUS WITH HYPERGLYCEMIA, WITHOUT LONG-TERM CURRENT USE OF INSULIN (HCC): ICD-10-CM

## 2021-04-08 DIAGNOSIS — R63.4 WEIGHT LOSS: Primary | ICD-10-CM

## 2021-04-08 DIAGNOSIS — I10 ESSENTIAL HYPERTENSION: ICD-10-CM

## 2021-04-08 PROCEDURE — 99214 OFFICE O/P EST MOD 30 MIN: CPT | Performed by: INTERNAL MEDICINE

## 2021-04-16 ENCOUNTER — HOSPITAL ENCOUNTER (OUTPATIENT)
Dept: VACCINE CLINIC | Facility: HOSPITAL | Age: 61
Discharge: HOME | End: 2021-04-16
Attending: INTERNAL MEDICINE

## 2021-04-19 ENCOUNTER — TELEPHONE (OUTPATIENT)
Dept: FAMILY MEDICINE CLINIC | Facility: CLINIC | Age: 61
End: 2021-04-19

## 2021-04-19 DIAGNOSIS — G89.29 CHRONIC ANKLE PAIN, UNSPECIFIED LATERALITY: Primary | ICD-10-CM

## 2021-04-19 DIAGNOSIS — M25.579 CHRONIC ANKLE PAIN, UNSPECIFIED LATERALITY: Primary | ICD-10-CM

## 2021-04-19 NOTE — TELEPHONE ENCOUNTER
Caller: DUKE RODRIGUEZ    Relationship: Emergency Contact    Best call back number: [phone]*    What is the medical concern/diagnosis: ANKLE & FOOT PAIN    What specialty or service is being requested: ORTHOPEDICS    What is the provider, practice or medical service name: DR. LUÍS SOTO. [facility]    What is the office location: [address] [place] [address], [address]    What is the office phone number: [phone]    Any additional details: DR. SOTO IS ONLY IN THIS OFFICE ON Tuesday'S. THE PATIENT'S WIFE STATED THAT THE ORIGINAL SURGEON THAT PERFORMED THE PATIENT'S ANKLE SURGERY HAS RETIRED. PATIENT'S WIFE ALSO STATED THAT SHE AND THE PATIENT WILL BE AWAY UNTIL AFTER 05/03/21, AND CAN BE CONTACTED THEN.

## 2021-04-24 NOTE — PROGRESS NOTES
04/08/2021    CC: Hypertension (follow up) and Ankle Pain (right ankle pain.  top of left foot pain)  .        HPI  Hypertension  This is a chronic problem. The current episode started more than 1 year ago. The problem has been resolved since onset. There are no associated agents to hypertension. Risk factors for coronary artery disease include male gender and dyslipidemia.   Ankle Pain          Subjective   Noah Flannery Jr. is a 60 y.o. male.      The following portions of the patient's history were reviewed and updated as appropriate: allergies, current medications, past family history, past medical history, past social history, past surgical history and problem list.    Problem List  Patient Active Problem List   Diagnosis   • Personal history of colonic polyps   • Palpitations   • Hypertension   • Hypercalcemia   • Hyperlipidemia   • History of parathyroidectomy (CMS/HCC)   • JACQUELYN (obstructive sleep apnea)   • Type 2 diabetes mellitus without complication, without long-term current use of insulin (CMS/HCC)       Past Medical History  Past Medical History:   Diagnosis Date   • Broken ankle    • Broken ribs     right   • Colon polyp    • Diabetes mellitus (CMS/HCC)    • Hyperlipidemia    • Hypertension    • Sleep apnea        Surgical History  Past Surgical History:   Procedure Laterality Date   • ANKLE SURGERY     • APPENDECTOMY  aoril 23 79   • COLONOSCOPY     • COLONOSCOPY N/A 7/30/2019    Procedure: COLONOSCOPY to cecum into TI with cold biopsy and cold snare polypectomies;  Surgeon: Thomas Concepcion MD;  Location: [facility] ENDOSCOPY;  Service: Gastroenterology   • HERNIA REPAIR     • PARATHYROID GLAND SURGERY  2017    Right superior and left superior parathyroidectomy for adenoma.  Dr. Dumont.  [place]   • TOTAL SHOULDER REPLACEMENT Left    • TOTAL SHOULDER REPLACEMENT Right 11/18/2020       Family History  Family History   Problem Relation Age of Onset   • Diabetes Mother    • Hypertension Mother     • Heart disease Mother    • Lung cancer Mother    • Obesity Mother    • Heart disease Father    • Lung cancer Father    • Obesity Sister    • Diabetes Sister    • Obesity Brother    • Kidney disease Brother    • Colon cancer Neg Hx    • Colon polyps Neg Hx        Social History  Social History    Tobacco Use      Smoking status: Never Smoker      Smokeless tobacco: Never Used       Is the Patient a current tobacco user? No    Allergies  No Known Allergies    Current Medications    Current Outpatient Medications:   •  bisoprolol (ZEBeta) 5 MG tablet, TAKE ONE TABLET BY MOUTH DAILY, Disp: 90 tablet, Rfl: 1  •  Cholecalciferol (Vitamin D-3) 25 MCG (1000 UT) capsule, Take 1,000 Units by mouth Daily., Disp: 60 capsule, Rfl:   •  Empagliflozin (Jardiance) 25 MG tablet, Take 25 mg by mouth Daily., Disp: 90 tablet, Rfl: 1  •  glucose blood test strip, 1 each by Other route As Needed. Use as instructed, Disp: , Rfl:   •  glucose blood test strip, 1 each by Other route Daily. Use as instructed, Disp: 50 each, Rfl: 6  •  Lancets (freestyle) lancets, Use 1 daily to help monitor glucose, Disp: 100 each, Rfl: 3  •  levocetirizine (XYZAL) 5 MG tablet, Take 5 mg by mouth Every Evening., Disp: , Rfl:   •  metFORMIN (GLUCOPHAGE) 1000 MG tablet, Take 1 tablet by mouth 2 (Two) Times a Day With Meals., Disp: 180 tablet, Rfl: 1  •  rosuvastatin (CRESTOR) 20 MG tablet, Take 20 mg by mouth Daily., Disp: , Rfl:   •  vitamin B-12 (CYANOCOBALAMIN) 500 MCG tablet, Take 500 mcg by mouth Daily., Disp: , Rfl:      Review of System  Review of Systems   Respiratory: Negative.    Cardiovascular: Negative.    Gastrointestinal: Negative.    Endocrine: Negative.      I have reviewed and confirmed the accuracy of the ROS as documented by the MA/LPN/RN Rashad Macias MD    Vitals:    04/08/21 1102   BP: 110/80   Pulse: 66   Resp: 16   Temp: 98 °F (36.7 °C)   SpO2: 99%     Body mass index is 28.48 kg/m².    Objective     Physical Exam  Physical  Exam  Cardiovascular:      Rate and Rhythm: Normal rate.      Pulses: Normal pulses.      Heart sounds: Normal heart sounds.   Pulmonary:      Effort: Pulmonary effort is normal.      Breath sounds: Normal breath sounds.   Abdominal:      General: Abdomen is flat.         Assessment/Plan      This 60-year-old patient presents at this time for follow-up of hypertension and shoulder pain.  He is accompanied by his wife who gives historical information.  Patient had shoulder replacement done approximately a year and half or so ago and is progressing well.  He also had a pulmonary lesion that is being followed by pulmonary next follow-up in May of this year.  There is no evidence of any increase in size of the lesion on last test in October it was 2.2 cm in diameter located in the left lower lobe.    Patient has diabetes mellitus type 2 which is being managed by Dr. Mccabe, endocrinologist.    Patient's blood pressure 110/80 in the left arm sitting position Standard cuff today.  Note is made that he's lost 8 pounds from his last visit this is been intentional and that he has been watching his caloric intake more closely.        Diagnoses and all orders for this visit:    1. Weight loss (Primary)    2. Type 2 diabetes mellitus with hyperglycemia, without long-term current use of insulin (CMS/[facility])    3. Essential hypertension      Plan:  1.)  Follow-up in 3-5 months for follow-up of pulmonary lesion.       Rashad Macias MD  04/08/2021

## 2021-05-20 ENCOUNTER — OFFICE VISIT (OUTPATIENT)
Dept: ORTHOPEDIC SURGERY | Facility: CLINIC | Age: 61
End: 2021-05-20

## 2021-05-20 VITALS — BODY MASS INDEX: 27.59 KG/M2 | TEMPERATURE: 97.5 F | WEIGHT: 215 LBS | HEIGHT: 74 IN

## 2021-05-20 DIAGNOSIS — R52 PAIN: ICD-10-CM

## 2021-05-20 DIAGNOSIS — M76.61 TENDONITIS, ACHILLES, RIGHT: ICD-10-CM

## 2021-05-20 DIAGNOSIS — M21.40 PES PLANUS, UNSPECIFIED LATERALITY: ICD-10-CM

## 2021-05-20 DIAGNOSIS — M19.079 ARTHRITIS OF ANKLE: Primary | ICD-10-CM

## 2021-05-20 PROCEDURE — 99244 OFF/OP CNSLTJ NEW/EST MOD 40: CPT | Performed by: ORTHOPAEDIC SURGERY

## 2021-05-20 PROCEDURE — 73610 X-RAY EXAM OF ANKLE: CPT | Performed by: ORTHOPAEDIC SURGERY

## 2021-05-20 RX ORDER — DICLOFENAC SODIUM 20 MG/G
1 SOLUTION TOPICAL 2 TIMES DAILY
Qty: 112 G | Refills: 12 | Status: SHIPPED | OUTPATIENT
Start: 2021-05-20 | End: 2022-08-01 | Stop reason: SDUPTHER
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
## 2021-07-13 RX ORDER — BISOPROLOL FUMARATE 5 MG/1
TABLET, FILM COATED ORAL
Qty: 90 TABLET | Refills: 1 | Status: SHIPPED | OUTPATIENT
Start: 2021-07-13 | End: 2022-01-11

## 2021-07-23 ENCOUNTER — OFFICE VISIT (OUTPATIENT)
Dept: FAMILY MEDICINE CLINIC | Facility: CLINIC | Age: 61
End: 2021-07-23

## 2021-07-23 VITALS
DIASTOLIC BLOOD PRESSURE: 70 MMHG | BODY MASS INDEX: 28.44 KG/M2 | SYSTOLIC BLOOD PRESSURE: 110 MMHG | RESPIRATION RATE: 16 BRPM | HEIGHT: 74 IN | WEIGHT: 221.6 LBS

## 2021-07-23 DIAGNOSIS — Z00.00 PE (PHYSICAL EXAM), ANNUAL: Primary | ICD-10-CM

## 2021-07-23 DIAGNOSIS — E78.00 PURE HYPERCHOLESTEROLEMIA: ICD-10-CM

## 2021-07-23 DIAGNOSIS — E11.65 TYPE 2 DIABETES MELLITUS WITH HYPERGLYCEMIA, WITHOUT LONG-TERM CURRENT USE OF INSULIN (HCC): Chronic | ICD-10-CM

## 2021-07-23 PROCEDURE — 99396 PREV VISIT EST AGE 40-64: CPT | Performed by: INTERNAL MEDICINE

## 2021-07-24 LAB
ALBUMIN SERPL-MCNC: 4.8 G/DL (ref 3.5–5.2)
ALBUMIN/GLOB SERPL: 2.7 G/DL
ALP SERPL-CCNC: 74 U/L (ref 39–117)
ALT SERPL-CCNC: 22 U/L (ref 1–41)
AST SERPL-CCNC: 22 U/L (ref 1–40)
BASOPHILS # BLD AUTO: 0.04 10*3/MM3 (ref 0–0.2)
BASOPHILS NFR BLD AUTO: 0.8 % (ref 0–1.5)
BILIRUB SERPL-MCNC: 0.4 MG/DL (ref 0–1.2)
BUN SERPL-MCNC: 11 MG/DL (ref 8–23)
BUN/CREAT SERPL: 10.9 (ref 7–25)
CALCIUM SERPL-MCNC: 9.4 MG/DL (ref 8.6–10.5)
CHLORIDE SERPL-SCNC: 105 MMOL/L (ref 98–107)
CO2 SERPL-SCNC: 28.1 MMOL/L (ref 22–29)
CREAT SERPL-MCNC: 1.01 MG/DL (ref 0.76–1.27)
EOSINOPHIL # BLD AUTO: 0.08 10*3/MM3 (ref 0–0.4)
EOSINOPHIL NFR BLD AUTO: 1.6 % (ref 0.3–6.2)
ERYTHROCYTE [DISTWIDTH] IN BLOOD BY AUTOMATED COUNT: 14.1 % (ref 12.3–15.4)
GLOBULIN SER CALC-MCNC: 1.8 GM/DL
GLUCOSE SERPL-MCNC: 112 MG/DL (ref 65–99)
HCT VFR BLD AUTO: 47.2 % (ref 37.5–51)
HCV AB S/CO SERPL IA: 0.2 S/CO RATIO (ref 0–0.9)
HGB BLD-MCNC: 15.8 G/DL (ref 13–17.7)
IMM GRANULOCYTES # BLD AUTO: 0.02 10*3/MM3 (ref 0–0.05)
IMM GRANULOCYTES NFR BLD AUTO: 0.4 % (ref 0–0.5)
LYMPHOCYTES # BLD AUTO: 1.77 10*3/MM3 (ref 0.7–3.1)
LYMPHOCYTES NFR BLD AUTO: 34.6 % (ref 19.6–45.3)
MCH RBC QN AUTO: 29.2 PG (ref 26.6–33)
MCHC RBC AUTO-ENTMCNC: 33.5 G/DL (ref 31.5–35.7)
MCV RBC AUTO: 87.2 FL (ref 79–97)
MONOCYTES # BLD AUTO: 0.43 10*3/MM3 (ref 0.1–0.9)
MONOCYTES NFR BLD AUTO: 8.4 % (ref 5–12)
NEUTROPHILS # BLD AUTO: 2.78 10*3/MM3 (ref 1.7–7)
NEUTROPHILS NFR BLD AUTO: 54.2 % (ref 42.7–76)
NRBC BLD AUTO-RTO: 0 /100 WBC (ref 0–0.2)
PLATELET # BLD AUTO: 178 10*3/MM3 (ref 140–450)
POTASSIUM SERPL-SCNC: 4.1 MMOL/L (ref 3.5–5.2)
PROT SERPL-MCNC: 6.6 G/DL (ref 6–8.5)
RBC # BLD AUTO: 5.41 10*6/MM3 (ref 4.14–5.8)
SODIUM SERPL-SCNC: 140 MMOL/L (ref 136–145)
WBC # BLD AUTO: 5.12 10*3/MM3 (ref 3.4–10.8)

## 2021-07-27 NOTE — PROGRESS NOTES
07/23/2021    CC: Annual Exam (...no other issues)  .        HPI  This pleasant patient presents today for physical examination.  He relates she's feeling well having had no problems in the interim of visits.       Subjective   Noah Flannery is a 61 y.o. male.      The following portions of the patient's history were reviewed and updated as appropriate: allergies, current medications, past family history, past medical history, past social history, past surgical history and problem list.    Problem List  Patient Active Problem List   Diagnosis   • Personal history of colonic polyps   • Palpitations   • Hypertension   • Hypercalcemia   • Hyperlipidemia   • History of parathyroidectomy (CMS/HCC)   • JACQUELYN (obstructive sleep apnea)   • Type 2 diabetes mellitus without complication, without long-term current use of insulin (CMS/HCC)   • Pes planus   • Tendonitis, Achilles, right   • Arthritis of ankle       Past Medical History  Past Medical History:   Diagnosis Date   • Broken ankle    • Broken ribs     right   • Colon polyp    • Diabetes mellitus (CMS/HCC)    • Hyperlipidemia    • Hypertension    • Sleep apnea        Surgical History  Past Surgical History:   Procedure Laterality Date   • ANKLE SURGERY     • APPENDECTOMY  aoril 23 79   • COLONOSCOPY     • COLONOSCOPY N/A 7/30/2019    Procedure: COLONOSCOPY to cecum into TI with cold biopsy and cold snare polypectomies;  Surgeon: Thomas Concepcion MD;  Location: [facility] ENDOSCOPY;  Service: Gastroenterology   • HERNIA REPAIR     • PARATHYROID GLAND SURGERY  2017    Right superior and left superior parathyroidectomy for adenoma.  Dr. Dumont.  [place]   • TOTAL SHOULDER REPLACEMENT Left    • TOTAL SHOULDER REPLACEMENT Right 11/18/2020       Family History  Family History   Problem Relation Age of Onset   • Diabetes Mother    • Hypertension Mother    • Heart disease Mother    • Lung cancer Mother    • Obesity Mother    • Liver cancer Mother    • Heart disease  Father    • Lung cancer Father    • Obesity Sister    • Diabetes Sister    • Obesity Brother    • Kidney disease Brother    • Colon cancer Neg Hx    • Colon polyps Neg Hx        Social History  Social History    Tobacco Use      Smoking status: Never Smoker      Smokeless tobacco: Never Used       Is the Patient a current tobacco user? No    Allergies  No Known Allergies    Current Medications    Current Outpatient Medications:   •  bisoprolol (ZEBeta) 5 MG tablet, TAKE ONE TABLET BY MOUTH DAILY, Disp: 90 tablet, Rfl: 1  •  Cholecalciferol (Vitamin D-3) 25 MCG (1000 UT) capsule, Take 1,000 Units by mouth Daily., Disp: 60 capsule, Rfl:   •  Diclofenac Sodium 2 % solution, Apply 1 Pump topically 2 (two) times a day., Disp: 112 g, Rfl: 12  •  Empagliflozin (Jardiance) 25 MG tablet, Take 25 mg by mouth Daily., Disp: 90 tablet, Rfl: 1  •  glucose blood test strip, 1 each by Other route As Needed. Use as instructed, Disp: , Rfl:   •  glucose blood test strip, 1 each by Other route Daily. Use as instructed, Disp: 50 each, Rfl: 6  •  Lancets (freestyle) lancets, Use 1 daily to help monitor glucose, Disp: 100 each, Rfl: 3  •  levocetirizine (XYZAL) 5 MG tablet, Take 5 mg by mouth Every Evening., Disp: , Rfl:   •  metFORMIN (GLUCOPHAGE) 1000 MG tablet, Take 1 tablet by mouth 2 (Two) Times a Day With Meals., Disp: 180 tablet, Rfl: 1  •  rosuvastatin (CRESTOR) 20 MG tablet, Take 20 mg by mouth Daily., Disp: , Rfl:   •  vitamin B-12 (CYANOCOBALAMIN) 500 MCG tablet, Take 500 mcg by mouth Daily., Disp: , Rfl:      Review of System  Review of Systems   Constitutional: Negative.    HENT: Negative.    Eyes: Negative.    Respiratory: Negative.    Cardiovascular: Negative.    Gastrointestinal: Negative.    Musculoskeletal: Negative.    Skin: Negative.    Psychiatric/Behavioral: Negative.      I have reviewed and confirmed the accuracy of the ROS as documented by the MA/LPN/RN Rashad Macias MD    Vitals:    07/23/21 1015   BP:  110/70   Resp: 16     Body mass index is 28.45 kg/m².    Objective     Physical Exam  Physical Exam  Vitals and nursing note reviewed.   Constitutional:       Appearance: He is well-developed.   HENT:      Head: Normocephalic and atraumatic.   Eyes:      Conjunctiva/sclera: Conjunctivae normal.   Cardiovascular:      Rate and Rhythm: Normal rate and regular rhythm.      Heart sounds: Normal heart sounds.   Pulmonary:      Effort: Pulmonary effort is normal.      Breath sounds: Normal breath sounds.   Abdominal:      General: Bowel sounds are normal.      Palpations: Abdomen is soft.   Musculoskeletal:         General: Normal range of motion.      Cervical back: Normal range of motion and neck supple.   Skin:     General: Skin is warm and dry.   Neurological:      Mental Status: He is alert and oriented to person, place, and time.   Psychiatric:         Behavior: Behavior normal.         Assessment/Plan      Regarding anticipatory guidance.  We discussed the importance of regular excellent exercise to the tune of at least 150 minutes per week of walking or moderate exercise.  Patient has a history of diabetes mellitus type 2 and was seen recently by Dr. Duffy.  Patient's last hemoglobin A1c through me on 3/10/2021 was elevated at 8.0.  He is now seeing Dr. Duffy    Regarding anticipatory guidance.  We discussed the importance of maintaining regular fitness physical exercise to the tune of 30 minutes daily or 150 minutes weekly of moderate exercise.        Diagnoses and all orders for this visit:    1. PE (physical exam), annual (Primary)  -     Comprehensive Metabolic Panel  -     Urinalysis With Culture If Indicated -  -     CBC & Differential  -     Hepatitis C Antibody    2. Type 2 diabetes mellitus with hyperglycemia, without long-term current use of insulin (CMS/HCC)    3. Pure hypercholesterolemia      Plan:  1.)  Follow-up in 6-8 months.       Rashad Macias MD  07/23/2021

## 2021-09-08 NOTE — PROGRESS NOTES
Subjective   Noah Flannery is a 61 y.o. male.     F/u  for dm 2,hypertension, hyperlipidemia, hx of parathyroidectomy, JACQUELYN   / testing bs1  x day / last dm eye exam 3/23/21 [facility] / last dm foot exam 3/10/21 with dr Duffy      Patient is a 60-year-old male who was referred for diabetes consultation by Dr. Macias     He has known diabetes mellitus since 2016.  He is on Metformin 1000 mg BID and Jardiance 25 mg/day.  Insurance will no longer cover Farxiga.  He checks his blood sugar once a day and blood sugar after his lunch is 111-176.  He denies hypoglycemic episodes.  He has lost 14 lbs since 3/21.  His last meal was last night     His last eye examination was in 3/21.  He denies eye complaints.  He has no retinopathy.  He denies numbness in his feet.  He denies associated nephropathy.  Urine microalbumin is normal in 3/21.     He has hyperlipidemia is on Crestor 20 mg/day.  He denies myalgia.     He had right superior and left superior parathyroidectomy in 2017 by Dr. Dumont.  Pathology showed parathyroid adenoma.  He has no recurrence of hypercalcemia since then.  He had passed 2 kidney stones before the parathyroid surgery.     The following portions of the patient's history were reviewed and updated as appropriate: allergies, current medications, past family history, past medical history, past social history, past surgical history and problem list.    Review of Systems   Eyes: Negative.  Negative for visual disturbance.   Respiratory: Negative for shortness of breath.    Cardiovascular: Negative for chest pain and palpitations.   Gastrointestinal: Negative.    Endocrine: Negative for cold intolerance and heat intolerance.   Genitourinary: Negative.    Musculoskeletal: Negative for myalgias.   Neurological: Negative for numbness.     Objective      Vitals:    09/23/21 1037   BP: 102/72   BP Location: Right arm   Patient Position: Sitting   Cuff Size: Large Adult   Pulse: 65   SpO2: 96%   Weight: 97.6 kg  "(215 lb 3.2 oz)   Height: 188 cm (74.02\")     Physical Exam  Constitutional:       General: He is not in acute distress.     Appearance: He is not ill-appearing, toxic-appearing or diaphoretic.   Eyes:      General:         Right eye: No discharge.         Left eye: No discharge.      Extraocular Movements: Extraocular movements intact.      Conjunctiva/sclera: Conjunctivae normal.   Cardiovascular:      Rate and Rhythm: Normal rate and regular rhythm.      Pulses: Normal pulses.      Heart sounds: Normal heart sounds.   Pulmonary:      Effort: Pulmonary effort is normal. No respiratory distress.      Breath sounds: Normal breath sounds. No stridor.   Abdominal:      General: Bowel sounds are normal. There is no distension.      Palpations: Abdomen is soft. There is no mass.      Tenderness: There is no right CVA tenderness or left CVA tenderness.   Musculoskeletal:         General: No swelling or tenderness. Normal range of motion.      Cervical back: No tenderness.   Lymphadenopathy:      Cervical: No cervical adenopathy.   Skin:     General: Skin is warm and dry.      Coloration: Skin is not jaundiced or pale.   Neurological:      General: No focal deficit present.      Mental Status: He is alert and oriented to person, place, and time.   Psychiatric:         Mood and Affect: Mood normal.         Behavior: Behavior normal.       Office Visit on 07/23/2021   Component Date Value Ref Range Status   • Glucose 07/23/2021 112* 65 - 99 mg/dL Final   • BUN 07/23/2021 11  8 - 23 mg/dL Final   • Creatinine 07/23/2021 1.01  0.76 - 1.27 mg/dL Final   • eGFR Non  Am 07/23/2021 75  >60 mL/min/1.73 Final    Comment: GFR Normal >60  Chronic Kidney Disease <60  Kidney Failure <15     • eGFR  Am 07/23/2021 91  >60 mL/min/1.73 Final   • BUN/Creatinine Ratio 07/23/2021 10.9  7.0 - 25.0 Final   • Sodium 07/23/2021 140  136 - 145 mmol/L Final   • Potassium 07/23/2021 4.1  3.5 - 5.2 mmol/L Final   • Chloride 07/23/2021 " 105  98 - 107 mmol/L Final   • Total CO2 07/23/2021 28.1  22.0 - 29.0 mmol/L Final   • Calcium 07/23/2021 9.4  8.6 - 10.5 mg/dL Final   • Total Protein 07/23/2021 6.6  6.0 - 8.5 g/dL Final   • Albumin 07/23/2021 4.80  3.50 - 5.20 g/dL Final   • Globulin 07/23/2021 1.8  gm/dL Final   • A/G Ratio 07/23/2021 2.7  g/dL Final   • Total Bilirubin 07/23/2021 0.4  0.0 - 1.2 mg/dL Final   • Alkaline Phosphatase 07/23/2021 74  39 - 117 U/L Final   • AST (SGOT) 07/23/2021 22  1 - 40 U/L Final   • ALT (SGPT) 07/23/2021 22  1 - 41 U/L Final   • WBC 07/23/2021 5.12  3.40 - 10.80 10*3/mm3 Final   • RBC 07/23/2021 5.41  4.14 - 5.80 10*6/mm3 Final   • Hemoglobin 07/23/2021 15.8  13.0 - 17.7 g/dL Final   • Hematocrit 07/23/2021 47.2  37.5 - 51.0 % Final   • MCV 07/23/2021 87.2  79.0 - 97.0 fL Final   • MCH 07/23/2021 29.2  26.6 - 33.0 pg Final   • MCHC 07/23/2021 33.5  31.5 - 35.7 g/dL Final   • RDW 07/23/2021 14.1  12.3 - 15.4 % Final   • Platelets 07/23/2021 178  140 - 450 10*3/mm3 Final   • Neutrophil Rel % 07/23/2021 54.2  42.7 - 76.0 % Final   • Lymphocyte Rel % 07/23/2021 34.6  19.6 - 45.3 % Final   • Monocyte Rel % 07/23/2021 8.4  5.0 - 12.0 % Final   • Eosinophil Rel % 07/23/2021 1.6  0.3 - 6.2 % Final   • Basophil Rel % 07/23/2021 0.8  0.0 - 1.5 % Final   • Neutrophils Absolute 07/23/2021 2.78  1.70 - 7.00 10*3/mm3 Final   • Lymphocytes Absolute 07/23/2021 1.77  0.70 - 3.10 10*3/mm3 Final   • Monocytes Absolute 07/23/2021 0.43  0.10 - 0.90 10*3/mm3 Final   • Eosinophils Absolute 07/23/2021 0.08  0.00 - 0.40 10*3/mm3 Final   • Basophils Absolute 07/23/2021 0.04  0.00 - 0.20 10*3/mm3 Final   • Immature Granulocyte Rel % 07/23/2021 0.4  0.0 - 0.5 % Final   • Immature Grans Absolute 07/23/2021 0.02  0.00 - 0.05 10*3/mm3 Final   • nRBC 07/23/2021 0.0  0.0 - 0.2 /100 WBC Final   • Hep C Virus Ab 07/23/2021 0.2  0.0 - 0.9 s/co ratio Final    Comment:                                   Negative:     < 0.8                                Indeterminate: 0.8 - 0.9                                    Positive:     > 0.9   The CDC recommends that a positive HCV antibody result   be followed up with a HCV Nucleic Acid Amplification   test ([id number]).       Assessment/Plan   Diagnoses and all orders for this visit:    1. Type 2 diabetes mellitus without complication, without long-term current use of insulin (CMS/HCC) (Primary)  -     Comprehensive Metabolic Panel  -     TSH  -     T4, Free    2. Type 2 diabetes mellitus with hyperglycemia, with long-term current use of insulin (HCC)  -     metFORMIN (GLUCOPHAGE) 1000 MG tablet; Take 1 tablet by mouth 2 (Two) Times a Day With Meals.  Dispense: 180 tablet; Refill: 1  -     Comprehensive Metabolic Panel  -     Lipid Panel  -     Hemoglobin A1c  -     TSH  -     T4, Free    3. Essential hypertension  -     Comprehensive Metabolic Panel    4. Hyperlipidemia, unspecified hyperlipidemia type  -     Comprehensive Metabolic Panel  -     Lipid Panel  -     TSH  -     T4, Free    5. History of parathyroidectomy (CMS/[facility])  -     Comprehensive Metabolic Panel    6. JACQUELYN (obstructive sleep apnea)    Other orders  -     empagliflozin (Jardiance) 25 MG tablet tablet; Take 1 tablet by mouth Daily.  Dispense: 90 tablet; Refill: 1      Continue metformin and Jardiance.  Continue Crestor 20 mg/day.  Cotinue CPAP.  Continue bisoprolol per Dr. Macias.  Flu vac this fall.    RTC 6 mos.

## 2021-09-23 ENCOUNTER — OFFICE VISIT (OUTPATIENT)
Dept: ENDOCRINOLOGY | Age: 61
End: 2021-09-23

## 2021-09-23 VITALS
OXYGEN SATURATION: 96 % | HEIGHT: 74 IN | DIASTOLIC BLOOD PRESSURE: 72 MMHG | WEIGHT: 215.2 LBS | SYSTOLIC BLOOD PRESSURE: 102 MMHG | HEART RATE: 65 BPM | BODY MASS INDEX: 27.62 KG/M2

## 2021-09-23 DIAGNOSIS — E89.2 HISTORY OF PARATHYROIDECTOMY (HCC): ICD-10-CM

## 2021-09-23 DIAGNOSIS — I10 ESSENTIAL HYPERTENSION: ICD-10-CM

## 2021-09-23 DIAGNOSIS — Z79.4 TYPE 2 DIABETES MELLITUS WITH HYPERGLYCEMIA, WITH LONG-TERM CURRENT USE OF INSULIN (HCC): ICD-10-CM

## 2021-09-23 DIAGNOSIS — G47.33 OSA (OBSTRUCTIVE SLEEP APNEA): ICD-10-CM

## 2021-09-23 DIAGNOSIS — E11.9 TYPE 2 DIABETES MELLITUS WITHOUT COMPLICATION, WITHOUT LONG-TERM CURRENT USE OF INSULIN (HCC): Primary | ICD-10-CM

## 2021-09-23 DIAGNOSIS — E11.65 TYPE 2 DIABETES MELLITUS WITH HYPERGLYCEMIA, WITH LONG-TERM CURRENT USE OF INSULIN (HCC): ICD-10-CM

## 2021-09-23 DIAGNOSIS — E78.5 HYPERLIPIDEMIA, UNSPECIFIED HYPERLIPIDEMIA TYPE: ICD-10-CM

## 2021-09-23 PROCEDURE — 99214 OFFICE O/P EST MOD 30 MIN: CPT | Performed by: INTERNAL MEDICINE

## 2021-09-24 LAB
ALBUMIN SERPL-MCNC: 5 G/DL (ref 3.5–5.2)
ALBUMIN/GLOB SERPL: 3.3 G/DL
ALP SERPL-CCNC: 67 U/L (ref 39–117)
ALT SERPL-CCNC: 23 U/L (ref 1–41)
AST SERPL-CCNC: 19 U/L (ref 1–40)
BILIRUB SERPL-MCNC: 0.7 MG/DL (ref 0–1.2)
BUN SERPL-MCNC: 16 MG/DL (ref 8–23)
BUN/CREAT SERPL: 14.8 (ref 7–25)
CALCIUM SERPL-MCNC: 9.7 MG/DL (ref 8.6–10.5)
CHLORIDE SERPL-SCNC: 104 MMOL/L (ref 98–107)
CHOLEST SERPL-MCNC: 131 MG/DL (ref 0–200)
CO2 SERPL-SCNC: 25.1 MMOL/L (ref 22–29)
CREAT SERPL-MCNC: 1.08 MG/DL (ref 0.76–1.27)
GLOBULIN SER CALC-MCNC: 1.5 GM/DL
GLUCOSE SERPL-MCNC: 107 MG/DL (ref 65–99)
HBA1C MFR BLD: 6.7 % (ref 4.8–5.6)
HDLC SERPL-MCNC: 38 MG/DL (ref 40–60)
IMP & REVIEW OF LAB RESULTS: NORMAL
LDLC SERPL CALC-MCNC: 73 MG/DL (ref 0–100)
POTASSIUM SERPL-SCNC: 4.3 MMOL/L (ref 3.5–5.2)
PROT SERPL-MCNC: 6.5 G/DL (ref 6–8.5)
SODIUM SERPL-SCNC: 141 MMOL/L (ref 136–145)
T4 FREE SERPL-MCNC: 1.19 NG/DL (ref 0.93–1.7)
TRIGL SERPL-MCNC: 111 MG/DL (ref 0–150)
TSH SERPL DL<=0.005 MIU/L-ACNC: 3.47 UIU/ML (ref 0.27–4.2)
VLDLC SERPL CALC-MCNC: 20 MG/DL (ref 5–40)

## 2021-09-26 NOTE — PROGRESS NOTES
LDL 73.  HDL low at 38.  Triglycerides 111.  Continue Crestor 20 mg once a day and low-fat dietHemoglobin A1c improved at 6.7%.  Diabetes is well controlled.Normal thyroid function tests.Serum calcium normal.Copy of labs sent to Dr. Macias.Send results to patient.

## 2021-10-26 DIAGNOSIS — E78.00 PURE HYPERCHOLESTEROLEMIA: Primary | ICD-10-CM

## 2021-10-26 RX ORDER — ROSUVASTATIN CALCIUM 20 MG/1
20 TABLET, COATED ORAL DAILY
Qty: 90 TABLET | Refills: 1 | Status: SHIPPED | OUTPATIENT
Start: 2021-10-26 | End: 2022-04-11

## 2021-11-29 NOTE — TELEPHONE ENCOUNTER
Rx Refill Note  Requested Prescriptions     Pending Prescriptions Disp Refills   • FREESTYLE LITE test strip [Pharmacy Med Name: FREESTYLE LITE TEST STRIP]       Sig: USE TO TEST BLOOD SUGAR ONCE DAILY      Last office visit with prescribing clinician: 7/23/2021      Next office visit with prescribing clinician: 1/27/2022            oTbias Edmond MA  11/29/21, 07:57 EST

## 2021-11-30 RX ORDER — BLOOD-GLUCOSE METER
KIT MISCELLANEOUS
Qty: 50 EACH | Refills: 6 | Status: SHIPPED | OUTPATIENT
Start: 2021-11-30 | End: 2022-11-02

## 2021-11-30 RX ORDER — BLOOD-GLUCOSE METER
KIT MISCELLANEOUS
OUTPATIENT
Start: 2021-11-30

## 2021-12-02 RX ORDER — BLOOD-GLUCOSE METER
KIT MISCELLANEOUS
OUTPATIENT
Start: 2021-12-02

## 2022-01-11 RX ORDER — BISOPROLOL FUMARATE 5 MG/1
TABLET, FILM COATED ORAL
Qty: 90 TABLET | Refills: 1 | Status: SHIPPED | OUTPATIENT
Start: 2022-01-11 | End: 2022-07-15 | Stop reason: SDUPTHER

## 2022-01-13 RX ORDER — LANCETS 28 GAUGE
EACH MISCELLANEOUS
Qty: 100 EACH | Refills: 3 | Status: SHIPPED | OUTPATIENT
Start: 2022-01-13 | End: 2023-02-20

## 2022-01-27 ENCOUNTER — OFFICE VISIT (OUTPATIENT)
Dept: FAMILY MEDICINE CLINIC | Facility: CLINIC | Age: 62
End: 2022-01-27

## 2022-01-27 VITALS
HEIGHT: 74 IN | DIASTOLIC BLOOD PRESSURE: 70 MMHG | RESPIRATION RATE: 16 BRPM | WEIGHT: 218.2 LBS | SYSTOLIC BLOOD PRESSURE: 110 MMHG | BODY MASS INDEX: 28 KG/M2

## 2022-01-27 DIAGNOSIS — E78.2 MIXED HYPERLIPIDEMIA: Primary | ICD-10-CM

## 2022-01-27 DIAGNOSIS — J01.90 ACUTE NON-RECURRENT SINUSITIS, UNSPECIFIED LOCATION: ICD-10-CM

## 2022-01-27 PROCEDURE — 99214 OFFICE O/P EST MOD 30 MIN: CPT | Performed by: INTERNAL MEDICINE

## 2022-01-28 LAB
CHOLEST SERPL-MCNC: 123 MG/DL (ref 100–199)
HDLC SERPL-MCNC: 28 MG/DL
LDLC SERPL CALC-MCNC: 63 MG/DL (ref 0–99)
TRIGL SERPL-MCNC: 194 MG/DL (ref 0–149)
VLDLC SERPL CALC-MCNC: 32 MG/DL (ref 5–40)

## 2022-01-31 NOTE — PROGRESS NOTES
01/27/2022    CC: Hyperlipidemia (f/u...no other issues)  .        HPI  History of Present Illness     Subjective   Noah Flannery is a 61 y.o. male.      The following portions of the patient's history were reviewed and updated as appropriate: allergies, current medications, past family history, past medical history, past social history, past surgical history and problem list.    Problem List  Patient Active Problem List   Diagnosis   • Personal history of colonic polyps   • Palpitations   • Hypertension   • Hypercalcemia   • Hyperlipidemia   • History of parathyroidectomy (CMS/HCC)   • JACQUELYN (obstructive sleep apnea)   • Type 2 diabetes mellitus without complication, without long-term current use of insulin ([facility])   • Pes planus   • Tendonitis, Achilles, right   • Arthritis of ankle       Past Medical History  Past Medical History:   Diagnosis Date   • Broken ankle    • Broken ribs     right   • Colon polyp    • Diabetes mellitus (HCC)    • Hyperlipidemia    • Hypertension    • Sleep apnea        Surgical History  Past Surgical History:   Procedure Laterality Date   • ANKLE SURGERY     • APPENDECTOMY  aoril 23 79   • COLONOSCOPY     • COLONOSCOPY N/A 7/30/2019    Procedure: COLONOSCOPY to cecum into TI with cold biopsy and cold snare polypectomies;  Surgeon: Thomas Concepcion MD;  Location: [facility] ENDOSCOPY;  Service: Gastroenterology   • HERNIA REPAIR     • PARATHYROID GLAND SURGERY  2017    Right superior and left superior parathyroidectomy for adenoma.  Dr. Dumont.  [place]   • TOTAL SHOULDER REPLACEMENT Left    • TOTAL SHOULDER REPLACEMENT Right 11/18/2020       Family History  Family History   Problem Relation Age of Onset   • Diabetes Mother    • Hypertension Mother    • Heart disease Mother    • Lung cancer Mother    • Obesity Mother    • Liver cancer Mother    • Heart disease Father    • Lung cancer Father    • Obesity Sister    • Diabetes Sister    • Obesity Brother    • Kidney disease Brother     • Colon cancer Neg Hx    • Colon polyps Neg Hx        Social History  Social History    Tobacco Use      Smoking status: Never Smoker      Smokeless tobacco: Never Used       Is the Patient a current tobacco user? No    Allergies  No Known Allergies    Current Medications    Current Outpatient Medications:   •  bisoprolol (ZEBeta) 5 MG tablet, TAKE ONE TABLET BY MOUTH DAILY, Disp: 90 tablet, Rfl: 1  •  Cholecalciferol (Vitamin D-3) 25 MCG (1000 UT) capsule, Take 1,000 Units by mouth Daily., Disp: 60 capsule, Rfl:   •  Diclofenac Sodium 2 % solution, Apply 1 Pump topically 2 (two) times a day., Disp: 112 g, Rfl: 12  •  empagliflozin (Jardiance) 25 MG tablet tablet, Take 1 tablet by mouth Daily., Disp: 90 tablet, Rfl: 1  •  FREESTYLE LITE test strip, USE TO TEST BLOOD SUGAR ONCE DAILY, Disp: 50 each, Rfl: 6  •  glucose blood test strip, 1 each by Other route As Needed. Use as instructed, Disp: , Rfl:   •  Lancets (freestyle) lancets, USE ONE LANCET DAILY TO HELP MONITOR GLUCOSE, Disp: 100 each, Rfl: 3  •  levocetirizine (XYZAL) 5 MG tablet, Take 5 mg by mouth Every Evening., Disp: , Rfl:   •  metFORMIN (GLUCOPHAGE) 1000 MG tablet, Take 1 tablet by mouth 2 (Two) Times a Day With Meals., Disp: 180 tablet, Rfl: 1  •  rosuvastatin (CRESTOR) 20 MG tablet, Take 1 tablet by mouth Daily., Disp: 90 tablet, Rfl: 1  •  vitamin B-12 (CYANOCOBALAMIN) 500 MCG tablet, Take 500 mcg by mouth Daily., Disp: , Rfl:      Review of System  Review of Systems   Constitutional: Negative.    Eyes: Negative.    Respiratory: Negative.    Cardiovascular: Negative.    Gastrointestinal: Negative.      I have reviewed and confirmed the accuracy of the ROS as documented by the MA/LPN/RN Rashad Macias MD    Vitals:    01/27/22 1059   BP: 110/70   Resp: 16     Body mass index is 28.02 kg/m².    Objective     Physical Exam  Physical Exam  Constitutional:       Appearance: Normal appearance.   Cardiovascular:      Rate and Rhythm: Normal rate and  regular rhythm.   Pulmonary:      Effort: Pulmonary effort is normal.      Breath sounds: Normal breath sounds.   Musculoskeletal:      Cervical back: Normal range of motion and neck supple.         Assessment/Plan      This 61-year-old patient presents today for follow-up of hyperlipidemia and for discussion of facial discomfort.  He relates that discomfort has been present for about 7 to 8 days.  He had a prior history of rotator cuff tendinitis and relates that he is working out 3 days a week and feels fine.  He is accompanied by his wife who gives historical information.  He relates that he has cut back on his fat content in an effort to better his lipid state.    I reviewed the patient's lipids from 9/23 shows that his LDL was within normal limits at 73 while his HDL was slightly depressed at 38.  He is currently being followed by Dr. Duffy endocrinologist and is taking Crestor 20 mg once a day and on a low-fat diet.  He is due to follow-up with Dr. Duffy also for diabetes in April.    We will see the patient back in 6 to 7 months.    For presumptive sinusitis we will give him a Z-Jeferson and reevaluate his triglycerides with LDL.            Diagnoses and all orders for this visit:    1. Mixed hyperlipidemia (Primary)  -     Lipid panel    2. Acute non-recurrent sinusitis, unspecified location      Plan:  1.)  Lipid profile  2.)  Z-Jeferson take as directed  3.)  Follow-up in 6 to 7 months.       Rashad Macias MD  01/27/2022

## 2022-02-18 ENCOUNTER — TELEPHONE (OUTPATIENT)
Dept: GASTROENTEROLOGY | Facility: CLINIC | Age: 62
End: 2022-02-18

## 2022-02-21 NOTE — TELEPHONE ENCOUNTER
Return call from patient.  Did receive recall letter.  Will fill out and mail.  Explained, will to schedule once reviewed.  He understands.

## 2022-02-21 NOTE — TELEPHONE ENCOUNTER
Called patient and left message on his voice mail for call back.   Bladder non-tender and non-distended. Urine clear yellow.

## 2022-02-25 ENCOUNTER — TELEPHONE (OUTPATIENT)
Dept: GASTROENTEROLOGY | Facility: CLINIC | Age: 62
End: 2022-02-25

## 2022-02-28 ENCOUNTER — PREP FOR SURGERY (OUTPATIENT)
Dept: SURGERY | Facility: SURGERY CENTER | Age: 62
End: 2022-02-28

## 2022-02-28 DIAGNOSIS — Z86.010 PERSONAL HISTORY OF COLONIC POLYPS: Primary | ICD-10-CM

## 2022-03-01 NOTE — TELEPHONE ENCOUNTER
RETURN CALL FROM PATIENT.  SCHEDULED AT [facility] ON 08/02/2022 AT 1:15PM - ARRIVE 12PM.  WILL MAIL INSTRUCTIONS.

## 2022-04-11 DIAGNOSIS — E78.00 PURE HYPERCHOLESTEROLEMIA: ICD-10-CM

## 2022-04-11 RX ORDER — ROSUVASTATIN CALCIUM 20 MG/1
TABLET, COATED ORAL
Qty: 90 TABLET | Refills: 1 | Status: SHIPPED | OUTPATIENT
Start: 2022-04-11 | End: 2022-10-14

## 2022-04-12 NOTE — PROGRESS NOTES
Subjective   Noah Flannery is a 61 y.o. male.     F/u  for dm 2,hypertension, hyperlipidemia, hx of parathyroidectomy, JACQUELYN   / testing bs1  x day / last dm eye exam 3/23/21 vision first / last dm foot exam foot exam  with dr Duffy      Patient is a 61-year-old male who was came in for follow-up     He has known diabetes mellitus since 2016.  He is on Metformin 1000 mg BID and Jardiance 25 mg/day.  Insurance will no longer cover Farxiga.  He checks his blood sugar once a day.  Ave 's.  He denies hypoglycemic episodes.  He has gained 7 lbs since 9/21. His last meal was 8 AM.     His last eye examination was in 3/22.  He denies eye complaints.  He has no retinopathy.  He denies numbness in his feet.  He denies associated nephropathy.  Urine microalbumin is normal in 3/21.     He has hyperlipidemia is on Crestor 20 mg/day.  He denies myalgia.     He had right superior and left superior parathyroidectomy in 2017 by Dr. Dumont.  Pathology showed parathyroid adenoma.  He has no recurrence of hypercalcemia since then.  He had passed 2 kidney stones before the parathyroid surgery.    Has hypertension and intermittent palpitations.  He is on bisoprolol 5 mg/day prescribed by Dr. Selby.  He has no history of heart attack or stroke before.  He had normal myocardial perfusion study done in November 2019    He has vitamin D deficiency and is on vitamin D3 1000 units/day.    He has sleep apnea and is sleeping well with the CPAP.  He follows with Dr. Bustillo.    The following portions of the patient's history were reviewed and updated as appropriate: allergies, current medications, past family history, past medical history, past social history, past surgical history and problem list.    Review of Systems   Eyes: Negative for visual disturbance.   Respiratory: Negative for shortness of breath.    Cardiovascular: Positive for palpitations. Negative for chest pain.   Gastrointestinal: Negative.    Genitourinary: Negative.   "  Musculoskeletal: Negative for myalgias.   Neurological: Negative for numbness.     Objective      Vitals:    04/13/22 1039   BP: 138/93   Pulse: 68   Resp: 20   SpO2: 95%   Weight: 101 kg (222 lb 3.2 oz)   Height: 188 cm (74\")     Physical Exam  Constitutional:       General: He is not in acute distress.     Appearance: Normal appearance. He is not ill-appearing, toxic-appearing or diaphoretic.   Eyes:      General: No scleral icterus.        Right eye: No discharge.         Left eye: No discharge.      Extraocular Movements: Extraocular movements intact.      Conjunctiva/sclera: Conjunctivae normal.   Cardiovascular:      Rate and Rhythm: Normal rate and regular rhythm.      Pulses: Normal pulses.      Heart sounds: Normal heart sounds. No murmur heard.    No friction rub.   Pulmonary:      Effort: Pulmonary effort is normal. No respiratory distress.      Breath sounds: Normal breath sounds. No stridor. No rales.   Chest:      Chest wall: No tenderness.   Abdominal:      General: Bowel sounds are normal. There is no distension.      Palpations: Abdomen is soft. There is no mass.      Tenderness: There is no right CVA tenderness or left CVA tenderness.   Musculoskeletal:         General: Normal range of motion.      Cervical back: Normal range of motion and neck supple.   Skin:     General: Skin is warm and dry.   Neurological:      General: No focal deficit present.      Mental Status: He is alert and oriented to person, place, and time.   Psychiatric:         Mood and Affect: Mood normal.         Behavior: Behavior normal.       Office Visit on 01/27/2022   Component Date Value Ref Range Status   • Total Cholesterol 01/27/2022 123  100 - 199 mg/dL Final   • Triglycerides 01/27/2022 194 (A) 0 - 149 mg/dL Final   • HDL Cholesterol 01/27/2022 28 (A) >39 mg/dL Final   • VLDL Cholesterol Bryce 01/27/2022 32  5 - 40 mg/dL Final   • LDL Chol Calc ([facility]) 01/27/2022 63  0 - 99 mg/dL Final     Assessment/Plan   Diagnoses and " all orders for this visit:    1. Type 2 diabetes mellitus without complication, without long-term current use of insulin ([facility]) (Primary)  -     Comprehensive Metabolic Panel  -     Lipid Panel  -     Hemoglobin A1c  -     TSH  -     T4, Free  -     Microalbumin / Creatinine Urine Ratio - Urine, Clean Catch    2. Hyperlipidemia, unspecified hyperlipidemia type  -     Comprehensive Metabolic Panel  -     Lipid Panel  -     TSH  -     T4, Free    3. Primary hypertension  -     Comprehensive Metabolic Panel    4. JACQUELYN (obstructive sleep apnea)  -     Comprehensive Metabolic Panel  -     TSH  -     T4, Free    5. History of parathyroidectomy (CMS/[facility])      Continue Jardiance and Metformin.  Check hemoglobin A1c.  Continue Crestor 20 mg/day.  Continue bisoprolol.  Will defer blood pressure control to Dr. Selby.  Continue CPAP.    Copy my note sent to Dr. Macias, Dr. Bustillo, and Dr. Selby.    RTC 4 mos.

## 2022-04-13 ENCOUNTER — OFFICE VISIT (OUTPATIENT)
Dept: ENDOCRINOLOGY | Age: 62
End: 2022-04-13

## 2022-04-13 VITALS
OXYGEN SATURATION: 95 % | SYSTOLIC BLOOD PRESSURE: 138 MMHG | HEIGHT: 74 IN | WEIGHT: 222.2 LBS | RESPIRATION RATE: 20 BRPM | DIASTOLIC BLOOD PRESSURE: 93 MMHG | HEART RATE: 68 BPM | BODY MASS INDEX: 28.52 KG/M2

## 2022-04-13 DIAGNOSIS — E78.5 HYPERLIPIDEMIA, UNSPECIFIED HYPERLIPIDEMIA TYPE: ICD-10-CM

## 2022-04-13 DIAGNOSIS — I10 PRIMARY HYPERTENSION: ICD-10-CM

## 2022-04-13 DIAGNOSIS — E11.9 TYPE 2 DIABETES MELLITUS WITHOUT COMPLICATION, WITHOUT LONG-TERM CURRENT USE OF INSULIN: Primary | ICD-10-CM

## 2022-04-13 DIAGNOSIS — E89.2 HISTORY OF PARATHYROIDECTOMY: ICD-10-CM

## 2022-04-13 DIAGNOSIS — G47.33 OSA (OBSTRUCTIVE SLEEP APNEA): ICD-10-CM

## 2022-04-13 PROCEDURE — 99214 OFFICE O/P EST MOD 30 MIN: CPT | Performed by: INTERNAL MEDICINE

## 2022-04-14 LAB
ALBUMIN SERPL-MCNC: 4.9 G/DL (ref 3.8–4.9)
ALBUMIN/CREAT UR: 16 MG/G CREAT (ref 0–29)
ALBUMIN/GLOB SERPL: 2.3 {RATIO} (ref 1.2–2.2)
ALP SERPL-CCNC: 73 IU/L (ref 44–121)
ALT SERPL-CCNC: 21 IU/L (ref 0–44)
AST SERPL-CCNC: 18 IU/L (ref 0–40)
BILIRUB SERPL-MCNC: 0.5 MG/DL (ref 0–1.2)
BUN SERPL-MCNC: 16 MG/DL (ref 8–27)
BUN/CREAT SERPL: 15 (ref 10–24)
CALCIUM SERPL-MCNC: 9.9 MG/DL (ref 8.6–10.2)
CHLORIDE SERPL-SCNC: 106 MMOL/L (ref 96–106)
CHOLEST SERPL-MCNC: 125 MG/DL (ref 100–199)
CO2 SERPL-SCNC: 24 MMOL/L (ref 20–29)
CREAT SERPL-MCNC: 1.07 MG/DL (ref 0.76–1.27)
CREAT UR-MCNC: 68.1 MG/DL
EGFRCR SERPLBLD CKD-EPI 2021: 79 ML/MIN/1.73
GLOBULIN SER CALC-MCNC: 2.1 G/DL (ref 1.5–4.5)
GLUCOSE SERPL-MCNC: 120 MG/DL (ref 65–99)
HBA1C MFR BLD: 7 % (ref 4.8–5.6)
HDLC SERPL-MCNC: 36 MG/DL
IMP & REVIEW OF LAB RESULTS: NORMAL
LDLC SERPL CALC-MCNC: 66 MG/DL (ref 0–99)
MICROALBUMIN UR-MCNC: 11.1 UG/ML
POTASSIUM SERPL-SCNC: 4.6 MMOL/L (ref 3.5–5.2)
PROT SERPL-MCNC: 7 G/DL (ref 6–8.5)
SODIUM SERPL-SCNC: 146 MMOL/L (ref 134–144)
T4 FREE SERPL-MCNC: 0.99 NG/DL (ref 0.82–1.77)
TRIGL SERPL-MCNC: 128 MG/DL (ref 0–149)
TSH SERPL DL<=0.005 MIU/L-ACNC: 3.81 UIU/ML (ref 0.45–4.5)
VLDLC SERPL CALC-MCNC: 23 MG/DL (ref 5–40)

## 2022-04-21 NOTE — PROGRESS NOTES
LDL 66.  HDL 36.  Triglycerides 128.  Continue Crestor 20 mg/day.  Hemoglobin A1c slightly higher at 7.0%.  Try to lose 5 to 10 pounds.  Normal urine microalbumin.  Normal thyroid function tests.  Serum sodium slightly elevated.  Stay well-hydrated.  Copy of labs sent to Dr. Macias.  Send copy of labs to patient

## 2022-07-07 ENCOUNTER — PREP FOR SURGERY (OUTPATIENT)
Dept: SURGERY | Facility: SURGERY CENTER | Age: 62
End: 2022-07-07

## 2022-07-11 RX ORDER — BISOPROLOL FUMARATE 5 MG/1
TABLET, FILM COATED ORAL
Qty: 90 TABLET | Refills: 1 | OUTPATIENT
Start: 2022-07-11

## 2022-07-13 RX ORDER — BISOPROLOL FUMARATE 5 MG/1
TABLET, FILM COATED ORAL
Qty: 90 TABLET | Refills: 1 | OUTPATIENT
Start: 2022-07-13

## 2022-07-15 RX ORDER — BISOPROLOL FUMARATE 5 MG/1
5 TABLET, FILM COATED ORAL DAILY
Qty: 30 TABLET | Refills: 0 | Status: SHIPPED | OUTPATIENT
Start: 2022-07-15 | End: 2022-08-18

## 2022-07-25 ENCOUNTER — PREP FOR SURGERY (OUTPATIENT)
Dept: SURGERY | Facility: SURGERY CENTER | Age: 62
End: 2022-07-25

## 2022-07-27 ENCOUNTER — OFFICE VISIT (OUTPATIENT)
Dept: FAMILY MEDICINE CLINIC | Facility: CLINIC | Age: 62
End: 2022-07-27

## 2022-07-27 VITALS
WEIGHT: 220.6 LBS | HEIGHT: 74 IN | BODY MASS INDEX: 28.31 KG/M2 | DIASTOLIC BLOOD PRESSURE: 80 MMHG | SYSTOLIC BLOOD PRESSURE: 110 MMHG | RESPIRATION RATE: 16 BRPM

## 2022-07-27 DIAGNOSIS — E78.2 MIXED HYPERLIPIDEMIA: ICD-10-CM

## 2022-07-27 DIAGNOSIS — E11.65 TYPE 2 DIABETES MELLITUS WITH HYPERGLYCEMIA, WITHOUT LONG-TERM CURRENT USE OF INSULIN: Primary | ICD-10-CM

## 2022-07-27 PROCEDURE — 99214 OFFICE O/P EST MOD 30 MIN: CPT | Performed by: INTERNAL MEDICINE

## 2022-07-28 ENCOUNTER — OFFICE VISIT (OUTPATIENT)
Dept: CARDIOLOGY | Facility: CLINIC | Age: 62
End: 2022-07-28

## 2022-07-28 VITALS
HEART RATE: 62 BPM | SYSTOLIC BLOOD PRESSURE: 133 MMHG | BODY MASS INDEX: 28.11 KG/M2 | WEIGHT: 219 LBS | HEIGHT: 74 IN | DIASTOLIC BLOOD PRESSURE: 81 MMHG

## 2022-07-28 DIAGNOSIS — R00.2 PALPITATIONS: Primary | ICD-10-CM

## 2022-07-28 DIAGNOSIS — I10 PRIMARY HYPERTENSION: ICD-10-CM

## 2022-07-28 DIAGNOSIS — E78.00 PURE HYPERCHOLESTEROLEMIA: ICD-10-CM

## 2022-07-28 PROCEDURE — 99214 OFFICE O/P EST MOD 30 MIN: CPT | Performed by: INTERNAL MEDICINE

## 2022-07-28 PROCEDURE — 93000 ELECTROCARDIOGRAM COMPLETE: CPT | Performed by: INTERNAL MEDICINE

## 2022-08-01 DIAGNOSIS — M76.61 TENDONITIS, ACHILLES, RIGHT: ICD-10-CM

## 2022-08-01 DIAGNOSIS — R52 PAIN: ICD-10-CM

## 2022-08-01 DIAGNOSIS — M19.079 ARTHRITIS OF ANKLE: ICD-10-CM

## 2022-08-01 DIAGNOSIS — M21.40 PES PLANUS, UNSPECIFIED LATERALITY: ICD-10-CM

## 2022-08-01 RX ORDER — DICLOFENAC SODIUM 20 MG/G
1 SOLUTION TOPICAL 2 TIMES DAILY
Qty: 112 G | Refills: 12 | Status: SHIPPED | OUTPATIENT
Start: 2022-08-01 | End: 2023-02-23

## 2022-08-02 ENCOUNTER — ANESTHESIA EVENT (OUTPATIENT)
Dept: SURGERY | Facility: SURGERY CENTER | Age: 62
End: 2022-08-02

## 2022-08-02 ENCOUNTER — ANESTHESIA (OUTPATIENT)
Dept: SURGERY | Facility: SURGERY CENTER | Age: 62
End: 2022-08-02

## 2022-08-02 ENCOUNTER — HOSPITAL ENCOUNTER (OUTPATIENT)
Facility: SURGERY CENTER | Age: 62
Setting detail: HOSPITAL OUTPATIENT SURGERY
Discharge: HOME | End: 2022-08-02
Attending: INTERNAL MEDICINE | Admitting: INTERNAL MEDICINE

## 2022-08-02 VITALS
OXYGEN SATURATION: 96 % | HEART RATE: 76 BPM | HEIGHT: 74 IN | TEMPERATURE: 97.5 F | BODY MASS INDEX: 28.06 KG/M2 | RESPIRATION RATE: 16 BRPM | DIASTOLIC BLOOD PRESSURE: 83 MMHG | SYSTOLIC BLOOD PRESSURE: 116 MMHG | WEIGHT: 218.6 LBS

## 2022-08-02 DIAGNOSIS — Z86.010 PERSONAL HISTORY OF COLONIC POLYPS: ICD-10-CM

## 2022-08-02 LAB — GLUCOSE BLDC GLUCOMTR-MCNC: 118 MG/DL (ref 70–130)

## 2022-08-02 PROCEDURE — 25010000002 PROPOFOL 10 MG/ML EMULSION: Performed by: ANESTHESIOLOGY

## 2022-08-02 PROCEDURE — 88305 TISSUE EXAM BY PATHOLOGIST: CPT | Performed by: INTERNAL MEDICINE

## 2022-08-02 PROCEDURE — 45380 COLONOSCOPY AND BIOPSY: CPT | Performed by: INTERNAL MEDICINE

## 2022-08-02 RX ORDER — SODIUM CHLORIDE 0.9 % (FLUSH) 0.9 %
10 SYRINGE (ML) INJECTION AS NEEDED
Status: DISCONTINUED | OUTPATIENT
Start: 2022-08-02 | End: 2022-08-02 | Stop reason: HOSPADM

## 2022-08-02 RX ORDER — LIDOCAINE HYDROCHLORIDE 20 MG/ML
INJECTION, SOLUTION INFILTRATION; PERINEURAL AS NEEDED
Status: DISCONTINUED | OUTPATIENT
Start: 2022-08-02 | End: 2022-08-02 | Stop reason: SURG

## 2022-08-02 RX ORDER — SODIUM CHLORIDE, SODIUM LACTATE, POTASSIUM CHLORIDE, CALCIUM CHLORIDE 600; 310; 30; 20 MG/100ML; MG/100ML; MG/100ML; MG/100ML
1000 INJECTION, SOLUTION INTRAVENOUS CONTINUOUS
Status: DISCONTINUED | OUTPATIENT
Start: 2022-08-02 | End: 2022-08-02 | Stop reason: HOSPADM

## 2022-08-02 RX ORDER — PROPOFOL 10 MG/ML
VIAL (ML) INTRAVENOUS AS NEEDED
Status: DISCONTINUED | OUTPATIENT
Start: 2022-08-02 | End: 2022-08-02 | Stop reason: SURG

## 2022-08-02 RX ORDER — LIDOCAINE HYDROCHLORIDE 10 MG/ML
0.5 INJECTION, SOLUTION INFILTRATION; PERINEURAL ONCE AS NEEDED
Status: DISCONTINUED | OUTPATIENT
Start: 2022-08-02 | End: 2022-08-02 | Stop reason: HOSPADM

## 2022-08-02 RX ADMIN — PROPOFOL 80 MG: 10 INJECTION, EMULSION INTRAVENOUS at 14:05

## 2022-08-02 RX ADMIN — PROPOFOL 180 MCG/KG/MIN: 10 INJECTION, EMULSION INTRAVENOUS at 14:04

## 2022-08-02 RX ADMIN — SODIUM CHLORIDE, POTASSIUM CHLORIDE, SODIUM LACTATE AND CALCIUM CHLORIDE 1000 ML: 600; 310; 30; 20 INJECTION, SOLUTION INTRAVENOUS at 12:12

## 2022-08-02 RX ADMIN — LIDOCAINE HYDROCHLORIDE 50 MG: 20 INJECTION, SOLUTION INFILTRATION; PERINEURAL at 14:04

## 2022-08-02 NOTE — ANESTHESIA POSTPROCEDURE EVALUATION
"Patient: Noah Flannery    Procedure Summary     Date: 08/02/22 Room / Location: [facility] OR [facility] / [facility]    Anesthesia Start: 1355 Anesthesia Stop: 1435    Procedure: COLONOSCOPY with Polypectomy (N/A ) Diagnosis:       Personal history of colonic polyps      Colon polyp      (Personal history of colonic polyps [Z86.010])    Surgeons: Thomas Concepcion MD Provider: Neymar Butcher MD    Anesthesia Type: MAC ASA Status: 3          Anesthesia Type: MAC    Vitals  Vitals Value Taken Time   /83 08/02/22 1459   Temp 36.4 °C (97.5 °F) 08/02/22 1434   Pulse 76 08/02/22 1459   Resp 16 08/02/22 1459   SpO2 96 % 08/02/22 1459           Post Anesthesia Care and Evaluation    Patient location during evaluation: bedside  Patient participation: complete - patient participated  Level of consciousness: awake and alert  Pain management: adequate    Airway patency: patent  Anesthetic complications: No anesthetic complications    Cardiovascular status: acceptable  Respiratory status: acceptable  Hydration status: acceptable    Comments: /83   Pulse 76   Temp 36.4 °C (97.5 °F)   Resp 16   Ht 188 cm (74\")   Wt 99.2 kg (218 lb 9.6 oz)   SpO2 96%   BMI 28.07 kg/m²           "

## 2022-08-02 NOTE — H&P
Patient Care Team:  Rashad Macias MD as PCP - General (Internal Medicine)  Silver Duffy MD as Consulting Physician (Endocrinology)  Adonis Vail OD (Optometry)  Norman Selby MD as Consulting Physician (Cardiology)  Jenaro Lemon MD as Surgeon (Orthopedic Surgery)  Tito Bustillo MD as Consulting Physician (Pulmonary Disease)    CHIEF COMPLAINT: Personal hx colon polyps    HISTORY OF PRESENT ILLNESS:  Last exam was 2019    Past Medical History:   Diagnosis Date   • Broken ankle    • Broken ribs     right   • Colon polyp    • Diabetes mellitus (HCC)    • Hyperlipidemia    • Hypertension    • Sleep apnea      Past Surgical History:   Procedure Laterality Date   • ANKLE SURGERY     • APPENDECTOMY  aoril 23 79   • COLONOSCOPY     • COLONOSCOPY N/A 7/30/2019    Procedure: COLONOSCOPY to cecum into TI with cold biopsy and cold snare polypectomies;  Surgeon: Thomas Concepcion MD;  Location: [facility] ENDOSCOPY;  Service: Gastroenterology   • HERNIA REPAIR     • PARATHYROID GLAND SURGERY  2017    Right superior and left superior parathyroidectomy for adenoma.  Dr. Dumont.  [place]   • TOTAL SHOULDER REPLACEMENT Left    • TOTAL SHOULDER REPLACEMENT Right 11/18/2020     Family History   Problem Relation Age of Onset   • Diabetes Mother    • Hypertension Mother    • Heart disease Mother    • Lung cancer Mother    • Obesity Mother    • Liver cancer Mother    • Heart disease Father    • Lung cancer Father    • Obesity Sister    • Diabetes Sister    • Obesity Brother    • Kidney disease Brother    • Colon cancer Neg Hx    • Colon polyps Neg Hx      Social History     Tobacco Use   • Smoking status: Never Smoker   • Smokeless tobacco: Never Used   Vaping Use   • Vaping Use: Never used   Substance Use Topics   • Alcohol use: Yes     Comment: occ   • Drug use: No     Medications Prior to Admission   Medication Sig Dispense Refill Last Dose   • bisoprolol (ZEBeta) 5 MG tablet Take 1  "tablet by mouth Daily. 30 tablet 0 Past Week at Unknown time   • Cetirizine HCl (ZYRTEC PO) Take 10 mg by mouth Daily. One time daily unless he feels very bad then he takes 2   Past Week at Unknown time   • Cholecalciferol (Vitamin D-3) 25 MCG (1000 UT) capsule Take 1,000 Units by mouth Daily. 60 capsule  Past Week at Unknown time   • Diclofenac Sodium 2 % solution Apply 1 Pump topically 2 (Two) Times a Day. 112 g 12 8/2/2022 at Unknown time   • empagliflozin (Jardiance) 25 MG tablet tablet Take 1 tablet by mouth Daily. 90 tablet 1 Past Week at Unknown time   • levocetirizine (XYZAL) 5 MG tablet Take 5 mg by mouth Every Evening.   Past Week at Unknown time   • metFORMIN (GLUCOPHAGE) 1000 MG tablet Take 1 tablet by mouth 2 (Two) Times a Day With Meals. 180 tablet 1 8/1/2022 at Unknown time   • rosuvastatin (CRESTOR) 20 MG tablet TAKE ONE TABLET BY MOUTH DAILY 90 tablet 1 Past Week at Unknown time   • vitamin B-12 (CYANOCOBALAMIN) 500 MCG tablet Take 500 mcg by mouth Daily.   Past Week at Unknown time   • FREESTYLE LITE test strip USE TO TEST BLOOD SUGAR ONCE DAILY 50 each 6    • Lancets (freestyle) lancets USE ONE LANCET DAILY TO HELP MONITOR GLUCOSE 100 each 3      Allergies:  Patient has no known allergies.    REVIEW OF SYSTEMS:  Please see the above history of present illness for pertinent positives and negatives.  The remainder of the patient's systems have been reviewed and are negative.     Vital Signs  Temp:  [97 °F (36.1 °C)] 97 °F (36.1 °C)  Heart Rate:  [70] 70  Resp:  [16] 16  BP: (117)/(79) 117/79    Flowsheet Rows    Flowsheet Row First Filed Value   Admission Height 188 cm (74\") Documented at 08/02/2022 1213   Admission Weight 99.2 kg (218 lb 9.6 oz) Documented at 08/02/2022 1213           Physical Exam:  Physical Exam   Constitutional: Patient appears well-developed and well-nourished and in no acute distress   HEENT:   Head: Normocephalic and atraumatic.   Eyes:  Pupils are equal, round, and reactive " to light. EOM are intact. Sclerae are anicteric and non-injected.  Mouth and Throat: Patient has moist mucous membranes. Oropharynx is clear of any erythema or exudate.     Neck: Neck supple. No JVD present. No thyromegaly present. No lymphadenopathy present.  Cardiovascular: Regular rate, regular rhythm, S1 normal and S2 normal.  Exam reveals no gallop and no friction rub.  No murmur heard.  Pulmonary/Chest: Lungs are clear to auscultation bilaterally. No respiratory distress. No wheezes. No rhonchi. No rales.   Abdominal: Soft. Bowel sounds are normal. No distension and no mass. There is no hepatosplenomegaly. There is no tenderness.   Musculoskeletal: Normal Muscle tone  Extremities: No edema. Pulses are palpable in all 4 extremities.  Neurological: Patient is alert and oriented to person, place, and time. Cranial nerves II-XII are grossly intact with no focal deficits.  Skin: Skin is warm. No rash noted. Nails show no clubbing.  No cyanosis or erythema.    Debilities/Disabilities Identified: None  Emotional Behavior: Appropriate     Results Review:   I reviewed the patient's new clinical results.    Lab Results (most recent)     Procedure Component Value Units Date/Time    POC Glucose STAT [[id number]]  (Abnormal) Collected: 08/02/22 1212    Specimen: Blood Updated: 08/02/22 1213     Glucose 118 mg/dL           Imaging Results (Most Recent)     None        reviewed    ECG/EMG Results (most recent)     None        reviewed    Assessment & Plan   Personal hx colon polyps/  colonoscopy      I discussed the patient's findings and my recommendations with patient.     Thomas Concepcion MD  08/02/22  13:22 EDT    Time: 10 min prior to procedure.

## 2022-08-02 NOTE — ANESTHESIA PREPROCEDURE EVALUATION
Anesthesia Evaluation     Patient summary reviewed and Nursing notes reviewed   no history of anesthetic complications:  NPO Solid Status: > 6 hours  NPO Liquid Status: > 6 hours           Airway   Mallampati: II  TM distance: >3 FB  Neck ROM: full  no difficulty expected and No difficulty expected  Dental - normal exam     Pulmonary - normal exam    breath sounds clear to auscultation  (+) sleep apnea,   (-) rhonchi, decreased breath sounds, wheezes, rales, stridor  Cardiovascular - normal exam    NYHA Classification: I  ECG reviewed  Rhythm: regular  Rate: normal    (+) hypertension, hyperlipidemia,   (-) murmur, weak pulses, friction rub, systolic click, carotid bruits, JVD, peripheral edema      Neuro/Psych- negative ROS  GI/Hepatic/Renal/Endo    (+)   diabetes mellitus type 2,     Musculoskeletal (-) negative ROS    Abdominal  - normal exam    Abdomen: soft.   Substance History - negative use     OB/GYN negative ob/gyn ROS         Other - negative ROS                       Anesthesia Plan    ASA 3     MAC     intravenous induction     Anesthetic plan, risks, benefits, and alternatives have been provided, discussed and informed consent has been obtained with: patient.        CODE STATUS:

## 2022-08-02 NOTE — BRIEF OP NOTE
COLONOSCOPY  Progress Note    Noah Flannery  8/2/2022    Pre-op Diagnosis:   Personal history of colonic polyps [Z86.010]       Post-Op Diagnosis Codes:     * Personal history of colonic polyps [Z86.010]     * Colon polyp [K63.5]    Procedure/CPT® Codes:        Procedure(s):  COLONOSCOPY with Polypectomy    Surgeon(s):  Thomas Concepcion MD    Anesthesia: Monitored Anesthesia Care    Staff:   Endo Technician: Ritu Cespedes  Endo Nurse: Beryl Castillo RN         Estimated Blood Loss: none    Urine Voided: * No values recorded between 8/2/2022  1:55 PM and 8/2/2022  2:29 PM *    Specimens:                Specimens     ID Source Type Tests Collected By Collected At Frozen?    A Large Intestine, Transverse Colon Tissue · TISSUE PATHOLOGY EXAM   Thomas Concepcion MD 8/2/22 1412 No    Description: transverse polyp x2    B Large Intestine, Right / Ascending Colon Tissue · TISSUE PATHOLOGY EXAM   Thomas Concepcion MD 8/2/22 1413 No    Description: Ascending polyp    C Large Intestine, Sigmoid Colon Tissue · TISSUE PATHOLOGY EXAM   Thomas Concepcion MD 8/2/22 1426 No    Description: sigmoid polyp x2    D Large Intestine, Rectum Tissue · TISSUE PATHOLOGY EXAM   Thomas Concepcion MD 8/2/22 1428 No    Description: rectal polyp                Drains: * No LDAs found *    Findings: Colon to Cecum Good Prep  [id number]        Complications: None          Thomas Concepcion MD     Date: 8/2/2022  Time: 14:32 EDT

## 2022-08-02 NOTE — DISCHARGE INSTRUCTIONS
ENDOSCOPY - EGD/COLONOSCOPY       ADULT CARE DISCHARGE  INSTRUCTIONS     Symptoms you may temporarily experience:      Sore Throat     Hoarseness     Bloating/Cramping     Dizziness     IV Irritation/tenderness     Gas or Belching     Slight fever     Small amount of blood in vomit or stool       Call Your Doctor for the following Problems: ______________________     ________________     Fever of 101 degrees or higher       Sharp abdominal  pain     Red streak up the arm from the IV site     Severe cramping        Large amount of blood in stool or vomit       Instructions for the next 24 hours after your Procedure:     Adult supervision     Do NOT drink any alcohol      Do not work today     NO important decisions     DO NOT sign any legal documents     You may shower/ bathe       DO NOT  DRIVE or operate machinery     Resume normal activity tomorrow       Discharge  Diet:     Avoid spicy/ greasy foods     Avoid any food that will cause more gas or bloating       *** Seek IMMEDIATE medical attention and call 911 if you develop symptoms such as:     Chest pain     Shortness of breath     Severe bleeding  ENDOSCOPY - EGD/COLONOSCOPY       ADULT CARE DISCHARGE  INSTRUCTIONS     Symptoms you may temporarily experience:      Sore Throat     Hoarseness     Bloating/Cramping     Dizziness     IV Irritation/tenderness     Gas or Belching     Slight fever     Small amount of blood in vomit or stool       Call Your Doctor for the following Problems: ______________________     ________________     Fever of 101 degrees or higher       Sharp abdominal  pain     Red streak up the arm from the IV site     Severe cramping        Large amount of blood in stool or vomit       Instructions for the next 24 hours after your Procedure:     Adult supervision     Do NOT drink any alcohol      Do not work today     NO important decisions     DO NOT sign any legal documents     You may shower/ bathe       DO NOT  DRIVE or operate machinery      Resume normal activity tomorrow       Discharge  Diet:     Avoid spicy/ greasy foods     Avoid any food that will cause more gas or bloating       *** Seek IMMEDIATE medical attention and call 911 if you develop symptoms such as:     Chest pain     Shortness of breath     Severe bleeding

## 2022-08-04 LAB
LAB AP CASE REPORT: NORMAL
PATH REPORT.FINAL DX SPEC: NORMAL
PATH REPORT.GROSS SPEC: NORMAL

## 2022-08-09 NOTE — PROGRESS NOTES
Answers for HPI/ROS submitted by the patient on 7/20/2022  What is the primary reason for your visit?: Diabetes  Diabetes type: type 2  MedicAlert ID: No  Disease duration: 5 years  blurred vision: No  chest pain: No  fatigue: No  foot paresthesias: No  foot ulcerations: Yes  polydipsia: No  polyphagia: No  polyuria: No  visual change: No  weakness: No  weight loss: No  Symptom course: stable  confusion: No  dizziness: No  headaches: No  hunger: No  mood changes: No  nervous/anxious: No  pallor: No  seizures: No  sleepiness: No  speech difficulty: No  sweats: Yes  tremors: No  blackouts: No  hospitalization: No  nocturnal hypoglycemia: No  required assistance: No  required glucagon: No  CVA: No  heart disease: No  impotence: No  nephropathy: No  peripheral neuropathy: No  PVD: No  retinopathy: No  CAD risks: dyslipidemia, hypertension  Current treatments: none, oral agent (dual therapy)  Treatment compliance: none of the time  Home blood tests: 1-2 x per day  Monitoring compliance: excellent  Blood glucose trend: fluctuating minimally  breakfast time: 7-8 am  breakfast glucose level: 110-130  lunch time: 12-1 pm  lunch glucose level: 140-180  dinner glucose level: 110-130  High score: 140-180  Weight trend: stable  Current diet: generally healthy  Meal planning: none  Exercise: three times a week  Dietitian visit: No  Eye exam current: Yes  Sees podiatrist: No    07/27/2022    CC: Hyperlipidemia (F/U---no other issues)  .        HPI  Diabetes  He has type 2 diabetes mellitus. No MedicAlert identification noted. The initial diagnosis of diabetes was made 5 years ago. Hypoglycemia symptoms include sweats. Pertinent negatives for hypoglycemia include no confusion, dizziness, headaches, hunger, mood changes, nervousness/anxiousness, pallor, seizures, sleepiness, speech difficulty or tremors. Associated symptoms include foot ulcerations. Pertinent negatives for diabetes include no blurred vision, no chest pain, no  fatigue, no foot paresthesias, no polydipsia, no polyphagia, no polyuria, no visual change, no weakness and no weight loss. Pertinent negatives for hypoglycemia complications include no blackouts, no hospitalization, no nocturnal hypoglycemia, no required assistance and no required glucagon injection. Symptoms are stable. Pertinent negatives for diabetic complications include no CVA, heart disease, impotence, nephropathy, peripheral neuropathy, PVD or retinopathy. Risk factors for coronary artery disease include dyslipidemia and hypertension. Current diabetic treatment includes none and oral agent (dual therapy). He is compliant with treatment none of the time. His weight is stable. He is following a generally healthy diet. When asked about meal planning, he reported none. He has not had a previous visit with a dietitian. He participates in exercise three times a week. He monitors blood glucose at home 1-2 x per day. Blood glucose monitoring compliance is excellent. His home blood glucose trend is fluctuating minimally. His breakfast blood glucose is taken between 7-8 am. His breakfast blood glucose range is generally 110-130 mg/dl. His lunch blood glucose is taken between 12-1 pm. His lunch blood glucose range is generally 140-180 mg/dl. His dinner blood glucose range is generally 110-130 mg/dl. His highest blood glucose is 140-180 mg/dl. He does not see a podiatrist.Eye exam is current.        Subjective   Noah Flannery is a 62 y.o. male.      The following portions of the patient's history were reviewed and updated as appropriate: allergies, current medications, past family history, past medical history, past social history, past surgical history and problem list.    Problem List  Patient Active Problem List   Diagnosis   • Personal history of colonic polyps   • Palpitations   • Hypertension   • Hypercalcemia   • Hyperlipidemia   • History of parathyroidectomy (CMS/[facility])   • JACQUELYN (obstructive sleep apnea)   •  Type 2 diabetes mellitus without complication, without long-term current use of insulin (HCC)   • Pes planus   • Tendonitis, Achilles, right   • Arthritis of ankle       Past Medical History  Past Medical History:   Diagnosis Date   • Broken ankle    • Broken ribs     right   • Colon polyp    • Diabetes mellitus (HCC)    • Hyperlipidemia    • Hypertension    • Sleep apnea        Surgical History  Past Surgical History:   Procedure Laterality Date   • ANKLE SURGERY     • APPENDECTOMY  aoril 23 79   • COLONOSCOPY     • COLONOSCOPY N/A 7/30/2019    Procedure: COLONOSCOPY to cecum into TI with cold biopsy and cold snare polypectomies;  Surgeon: Thomas Concepcion MD;  Location: [facility] ENDOSCOPY;  Service: Gastroenterology   • COLONOSCOPY N/A 8/2/2022    Procedure: COLONOSCOPY with Polypectomy;  Surgeon: Thomas Concepcion MD;  Location: [facility] MAIN OR;  Service: Gastroenterology;  Laterality: N/A;  Transverse polyp x2, ascending colon polyp   • HERNIA REPAIR     • PARATHYROID GLAND SURGERY  2017    Right superior and left superior parathyroidectomy for adenoma.  Dr. Dumont.  [place]   • TOTAL SHOULDER REPLACEMENT Left    • TOTAL SHOULDER REPLACEMENT Right 11/18/2020       Family History  Family History   Problem Relation Age of Onset   • Diabetes Mother    • Hypertension Mother    • Heart disease Mother    • Lung cancer Mother    • Obesity Mother    • Liver cancer Mother    • Heart disease Father    • Lung cancer Father    • Obesity Sister    • Diabetes Sister    • Obesity Brother    • Kidney disease Brother    • Colon cancer Neg Hx    • Colon polyps Neg Hx        Social History  Social History    Tobacco Use      Smoking status: Never Smoker      Smokeless tobacco: Never Used       Is the Patient a current tobacco user? No    Allergies  No Known Allergies    Current Medications    Current Outpatient Medications:   •  bisoprolol (ZEBeta) 5 MG tablet, Take 1 tablet by mouth Daily., Disp: 30 tablet, Rfl:  0  •  Cetirizine HCl (ZYRTEC PO), Take 10 mg by mouth Daily. One time daily unless he feels very bad then he takes 2, Disp: , Rfl:   •  Cholecalciferol (Vitamin D-3) 25 MCG (1000 UT) capsule, Take 1,000 Units by mouth Daily., Disp: 60 capsule, Rfl:   •  empagliflozin (Jardiance) 25 MG tablet tablet, Take 1 tablet by mouth Daily., Disp: 90 tablet, Rfl: 1  •  FREESTYLE LITE test strip, USE TO TEST BLOOD SUGAR ONCE DAILY, Disp: 50 each, Rfl: 6  •  Lancets (freestyle) lancets, USE ONE LANCET DAILY TO HELP MONITOR GLUCOSE, Disp: 100 each, Rfl: 3  •  levocetirizine (XYZAL) 5 MG tablet, Take 5 mg by mouth Every Evening., Disp: , Rfl:   •  metFORMIN (GLUCOPHAGE) 1000 MG tablet, Take 1 tablet by mouth 2 (Two) Times a Day With Meals., Disp: 180 tablet, Rfl: 1  •  rosuvastatin (CRESTOR) 20 MG tablet, TAKE ONE TABLET BY MOUTH DAILY, Disp: 90 tablet, Rfl: 1  •  vitamin B-12 (CYANOCOBALAMIN) 500 MCG tablet, Take 500 mcg by mouth Daily., Disp: , Rfl:   •  Diclofenac Sodium 2 % solution, Apply 1 Pump topically 2 (Two) Times a Day., Disp: 112 g, Rfl: 12     Review of System  Review of Systems   Constitutional: Negative for fatigue and unexpected weight loss.   Eyes: Negative for blurred vision.   Cardiovascular: Negative for chest pain.   Endocrine: Negative for polydipsia, polyphagia and polyuria.   Genitourinary: Negative for impotence.   Skin: Negative for pallor.   Neurological: Negative for dizziness, tremors, seizures, speech difficulty, weakness and confusion.   Psychiatric/Behavioral: The patient is not nervous/anxious.      I have reviewed and confirmed the accuracy of the ROS as documented by the MA/LPMELVIN/RN Rashad Macias MD    Vitals:    07/27/22 1012   BP: 110/80   Resp: 16     Body mass index is 28.32 kg/m².    Objective     Physical Exam  Physical Exam  HENT:      Head: Normocephalic and atraumatic.      Nose: Nose normal.   Cardiovascular:      Rate and Rhythm: Normal rate and regular rhythm.   Pulmonary:       Effort: Pulmonary effort is normal.      Breath sounds: Normal breath sounds.   Musculoskeletal:      Cervical back: Normal range of motion and neck supple.         Assessment & Plan      This 62-year-old patient presents at this time for follow-up of hyperlipidemia.  He relates he is feeling fine otherwise.  He has a history of hypertension and exhibits excellent blood control today with a blood pressure 110/80 in the left arm sitting position standard cuff.  He has been followed by Dr. Duffy endocrinologist for diabetes mellitus type 2.  Patient's glucose level today was 140 his glucose level over the past 7 days was 140 with his lowest level being 106.  His A1c as of 4/2 was 7.0.  He is due to follow-up with Dr. Duffy in the next month or so.    Has been followed by Dr. Sullivan for ENT and is currently on Flonase and Astelin in addition to his Zyrtec.  His weight is essentially unchanged from the past with the loss of possibly 2 pounds over the past 4 months.    Review of the patient's labs from 4/13/2022 shows that his total cholesterol was good at 125 with an LDL of only 66 and a triglyceride of 128.    The patient has been followed by pulmonary for noncalcified nodules in the left lower lung but these have all been unchanged since March 6/2020.  Radiology considers these therefore benign and no further imaging follow-up is required.    Diagnoses and all orders for this visit:    1. Type 2 diabetes mellitus with hyperglycemia, without long-term current use of insulin (HCC) (Primary)    2. Mixed hyperlipidemia      Plan:  1.)  Follow-up in 5 to 6 months.       Rashad Macias MD  07/27/2022

## 2022-08-18 RX ORDER — BISOPROLOL FUMARATE 5 MG/1
TABLET, FILM COATED ORAL
Qty: 30 TABLET | Refills: 3 | Status: SHIPPED | OUTPATIENT
Start: 2022-08-18 | End: 2022-08-23

## 2022-08-19 ENCOUNTER — TELEPHONE (OUTPATIENT)
Dept: CARDIOLOGY | Facility: CLINIC | Age: 62
End: 2022-08-19

## 2022-08-23 RX ORDER — BISOPROLOL FUMARATE 5 MG/1
TABLET, FILM COATED ORAL
Qty: 30 TABLET | Refills: 3 | Status: SHIPPED | OUTPATIENT
Start: 2022-08-23

## 2022-08-30 ENCOUNTER — HOSPITAL ENCOUNTER (OUTPATIENT)
Dept: NUCLEAR MEDICINE | Facility: HOSPITAL | Age: 62
Discharge: HOME | End: 2022-08-30

## 2022-08-30 ENCOUNTER — HOSPITAL ENCOUNTER (OUTPATIENT)
Dept: CARDIOLOGY | Facility: HOSPITAL | Age: 62
Discharge: HOME | End: 2022-08-30

## 2022-08-30 PROCEDURE — A9502 TC99M TETROFOSMIN: HCPCS | Performed by: INTERNAL MEDICINE

## 2022-08-30 PROCEDURE — 0 TECHNETIUM TETROFOSMIN KIT: Performed by: INTERNAL MEDICINE

## 2022-08-30 PROCEDURE — 78452 HT MUSCLE IMAGE SPECT MULT: CPT

## 2022-08-30 PROCEDURE — 93306 TTE W/DOPPLER COMPLETE: CPT

## 2022-08-30 PROCEDURE — 93017 CV STRESS TEST TRACING ONLY: CPT

## 2022-08-30 RX ADMIN — TETROFOSMIN 1 DOSE: 1.38 INJECTION, POWDER, LYOPHILIZED, FOR SOLUTION INTRAVENOUS at 10:47

## 2022-08-30 RX ADMIN — TETROFOSMIN 1 DOSE: 1.38 INJECTION, POWDER, LYOPHILIZED, FOR SOLUTION INTRAVENOUS at 09:25

## 2022-09-01 LAB
BH CV IMMEDIATE POST RECOVERY TECH DATA SYMPTOMS: NORMAL
BH CV IMMEDIATE POST TECH DATA BLOOD PRESSURE: NORMAL MMHG
BH CV IMMEDIATE POST TECH DATA HEART RATE: 140 BPM
BH CV IMMEDIATE POST TECH DATA OXYGEN SATS: 98 %
BH CV REST NUCLEAR ISOTOPE DOSE: 10 MCI
BH CV SIX MINUTE RECOVERY TECH DATA BLOOD PRESSURE: NORMAL
BH CV SIX MINUTE RECOVERY TECH DATA HEART RATE: 76 BPM
BH CV SIX MINUTE RECOVERY TECH DATA OXYGEN SATURATION: 98 %
BH CV STRESS BP STAGE 1: NORMAL
BH CV STRESS BP STAGE 2: NORMAL
BH CV STRESS BP STAGE 3: NORMAL
BH CV STRESS DURATION MIN STAGE 1: 3
BH CV STRESS DURATION MIN STAGE 2: 3
BH CV STRESS DURATION MIN STAGE 3: 3
BH CV STRESS DURATION SEC STAGE 1: 0
BH CV STRESS DURATION SEC STAGE 2: 0
BH CV STRESS DURATION SEC STAGE 3: 0
BH CV STRESS GRADE STAGE 1: 10
BH CV STRESS GRADE STAGE 2: 12
BH CV STRESS GRADE STAGE 3: 14
BH CV STRESS HR STAGE 1: 116
BH CV STRESS HR STAGE 2: 133
BH CV STRESS HR STAGE 3: 140
BH CV STRESS METS STAGE 1: 5
BH CV STRESS METS STAGE 2: 7.5
BH CV STRESS METS STAGE 3: 10
BH CV STRESS NUCLEAR ISOTOPE DOSE: 35.6 MCI
BH CV STRESS O2 STAGE 2: 98
BH CV STRESS O2 STAGE 3: 93
BH CV STRESS PROTOCOL 1: NORMAL
BH CV STRESS SPEED STAGE 1: 1.7
BH CV STRESS SPEED STAGE 2: 2.5
BH CV STRESS SPEED STAGE 3: 3.4
BH CV STRESS STAGE 1: 1
BH CV STRESS STAGE 2: 2
BH CV STRESS STAGE 3: 3
BH CV THREE MINUTE POST TECH DATA BLOOD PRESSURE: NORMAL MMHG
BH CV THREE MINUTE POST TECH DATA HEART RATE: 78 BPM
BH CV THREE MINUTE POST TECH DATA OXYGEN SATURATION: 98 %
LV EF NUC BP: 50 %
MAXIMAL PREDICTED HEART RATE: 159 BPM
PERCENT MAX PREDICTED HR: 88.05 %
STRESS BASELINE BP: NORMAL MMHG
STRESS BASELINE HR: 66 BPM
STRESS O2 SAT REST: 97 %
STRESS PERCENT HR: 104 %
STRESS POST O2 SAT PEAK: 93 %
STRESS POST PEAK BP: NORMAL MMHG
STRESS POST PEAK HR: 140 BPM
STRESS TARGET HR: 135 BPM

## 2022-09-02 NOTE — PROGRESS NOTES
Subjective   Noah Flannery is a 62 y.o. male.      F/u  for dm 2,hypertension, hyperlipidemia, hx of parathyroidectomy, JACQUELYN   / testing bs1  x day / last dm eye exam 3/23/21 [facility] / last dm foot exam foot 4/13/22 with dr Duffy         Patient is a 62-year-old male who was came in for follow-up     He has known diabetes mellitus since 2016.  He is on Metformin 1000 mg BID and Jardiance 25 mg/day.  Insurance will no longer cover Farxiga.  He checks his blood sugar once a day.    Fasting glucose less than 145.  Post lunch glucose 110-158.  He denies hypoglycemic episodes.  He has  lost 5 pounds since March 2022     His last eye examination was in 3/22.  He denies eye complaints.  He has no retinopathy.  He denies numbness in his feet.  He denies associated nephropathy.  Urine microalbumin is normal in April 2022     He has hyperlipidemia and is on Crestor 20 mg/day.  He denies myalgia.     He had right superior and left superior parathyroidectomy in 2017 by Dr. Dumont.  Pathology showed parathyroid adenoma.  He has no recurrence of hypercalcemia since then.  He had passed 2 kidney stones before the parathyroid surgery.     Has hypertension and intermittent palpitations.  He is on bisoprolol 5 mg/day prescribed by Dr. Selby.  He has no history of heart attack or stroke before.  He had normal myocardial perfusion study done in November 2019     He has vitamin D deficiency and is on vitamin D3 1000 units/day.     He has sleep apnea and is sleeping well with the CPAP.  He follows with Dr. Bustillo.    He had colonoscopy done by Dr. Concepcion on August 2, 2022.  He had tubular adenomas and hyperplastic polyps removed.  He was advised repeat colonoscopy in 2025.    The following portions of the patient's history were reviewed and updated as appropriate: allergies, current medications, past family history, past medical history, past social history, past surgical history and problem list.    Review of Systems  "  Eyes: Negative for visual disturbance.   Respiratory: Negative for shortness of breath.    Cardiovascular: Negative for chest pain and palpitations.   Gastrointestinal: Negative.    Endocrine: Negative for cold intolerance and heat intolerance.   Genitourinary: Negative.    Musculoskeletal: Negative for myalgias.   Neurological: Negative for numbness.   Hematological: Negative for adenopathy. Does not bruise/bleed easily.     Vitals:    09/06/22 1112   BP: 118/78   BP Location: Right arm   Patient Position: Sitting   Cuff Size: Large Adult   Pulse: 63   Resp: 16   Temp: 98.4 °F (36.9 °C)   TempSrc: Temporal   SpO2: 95%   Weight: 98.4 kg (217 lb)   Height: 188 cm (74.02\")      Objective   Physical Exam  Constitutional:       Appearance: Normal appearance. He is not toxic-appearing or diaphoretic.   Eyes:      General: No scleral icterus.        Right eye: No discharge.         Left eye: No discharge.      Extraocular Movements: Extraocular movements intact.      Conjunctiva/sclera: Conjunctivae normal.   Cardiovascular:      Rate and Rhythm: Normal rate and regular rhythm.      Pulses: Normal pulses.      Heart sounds: Normal heart sounds.   Pulmonary:      Effort: Pulmonary effort is normal. No respiratory distress.      Breath sounds: Normal breath sounds. No stridor. No rales.   Chest:      Chest wall: No tenderness.   Abdominal:      General: Bowel sounds are normal.      Palpations: Abdomen is soft.      Tenderness: There is no right CVA tenderness or left CVA tenderness.   Musculoskeletal:         General: Normal range of motion.   Skin:     General: Skin is warm and dry.   Neurological:      Mental Status: He is alert and oriented to person, place, and time.   Psychiatric:         Mood and Affect: Mood normal.         Behavior: Behavior normal.       Admission on 08/02/2022, Discharged on 08/02/2022   Component Date Value Ref Range Status   • Glucose 08/02/2022 118  70 - 130 mg/dL Final   • Case Report " 08/02/2022    Final                    Value:Surgical Pathology Report                         Case: [id number]                                  Authorizing Provider:  Carlene Thomas        Collected:           08/02/2022 02:12 PM                                 MD Dilshad                                                                   Ordering Location:     [facility] SURGERY     Received:            08/02/2022 03:02 PM                                 [facility] MAIN OR                                                     Pathologist:           Samira Atkinson MD                                                          Specimens:   1) - Large Intestine, Transverse Colon, transverse polyp x2                                         2) - Large Intestine, Right / Ascending Colon, Ascending polyp                                      3) - Large Intestine, Sigmoid Colon, sigmoid polyp x2                                               4) - Large Intestine, Rectum, rectal polyp                                                • Final Diagnosis 08/02/2022    Final                    Value:This result contains rich text formatting which cannot be displayed here.   • Gross Description 08/02/2022    Final                    Value:This result contains rich text formatting which cannot be displayed here.     Assessment & Plan   Diagnoses and all orders for this visit:    1. Type 2 diabetes mellitus without complication, without long-term current use of insulin ([facility]) (Primary)  -     Comprehensive Metabolic Panel  -     Hemoglobin A1c  -     TSH    2. Primary hypertension  -     Comprehensive Metabolic Panel    3. JACQUELYN (obstructive sleep apnea)    4. History of parathyroidectomy (CMS/[facility])    5. Hyperlipidemia, unspecified hyperlipidemia type  -     Comprehensive Metabolic Panel  -     Lipid Panel  -     TSH    6. Type 2 diabetes mellitus with hyperglycemia, with long-term current use of insulin ([facility])  -     metFORMIN  (GLUCOPHAGE) 1000 MG tablet; Take 1 tablet by mouth 2 (Two) Times a Day With Meals.  Dispense: 180 tablet; Refill: 1    Other orders  -     empagliflozin (Jardiance) 25 MG tablet tablet; Take 1 tablet by mouth Daily.  Dispense: 90 tablet; Refill: 1      Continue Jardiance and metformin.  Continue Crestor 20 mg/day.  Continue bisoprolol per cardiologist.  Continue CPAP.    Copy of my note sent to Dr. Macias    RTC 4 mos with RACHEL Rosas NP.

## 2022-09-06 ENCOUNTER — OFFICE VISIT (OUTPATIENT)
Dept: ENDOCRINOLOGY | Age: 62
End: 2022-09-06

## 2022-09-06 ENCOUNTER — OFFICE VISIT (OUTPATIENT)
Dept: CARDIOLOGY | Facility: CLINIC | Age: 62
End: 2022-09-06

## 2022-09-06 VITALS
HEART RATE: 68 BPM | BODY MASS INDEX: 27.85 KG/M2 | SYSTOLIC BLOOD PRESSURE: 116 MMHG | HEIGHT: 74 IN | DIASTOLIC BLOOD PRESSURE: 85 MMHG | WEIGHT: 217 LBS

## 2022-09-06 VITALS
DIASTOLIC BLOOD PRESSURE: 78 MMHG | HEART RATE: 63 BPM | RESPIRATION RATE: 16 BRPM | OXYGEN SATURATION: 95 % | WEIGHT: 217 LBS | SYSTOLIC BLOOD PRESSURE: 118 MMHG | TEMPERATURE: 98.4 F | BODY MASS INDEX: 27.85 KG/M2 | HEIGHT: 74 IN

## 2022-09-06 DIAGNOSIS — I10 PRIMARY HYPERTENSION: ICD-10-CM

## 2022-09-06 DIAGNOSIS — E11.9 TYPE 2 DIABETES MELLITUS WITHOUT COMPLICATION, WITHOUT LONG-TERM CURRENT USE OF INSULIN: Primary | ICD-10-CM

## 2022-09-06 DIAGNOSIS — R00.2 PALPITATIONS: Primary | ICD-10-CM

## 2022-09-06 DIAGNOSIS — E78.5 HYPERLIPIDEMIA, UNSPECIFIED HYPERLIPIDEMIA TYPE: ICD-10-CM

## 2022-09-06 DIAGNOSIS — G47.33 OSA (OBSTRUCTIVE SLEEP APNEA): ICD-10-CM

## 2022-09-06 DIAGNOSIS — E11.65 TYPE 2 DIABETES MELLITUS WITH HYPERGLYCEMIA, WITH LONG-TERM CURRENT USE OF INSULIN: ICD-10-CM

## 2022-09-06 DIAGNOSIS — E89.2 HISTORY OF PARATHYROIDECTOMY: ICD-10-CM

## 2022-09-06 DIAGNOSIS — Z79.4 TYPE 2 DIABETES MELLITUS WITH HYPERGLYCEMIA, WITH LONG-TERM CURRENT USE OF INSULIN: ICD-10-CM

## 2022-09-06 PROCEDURE — 99214 OFFICE O/P EST MOD 30 MIN: CPT | Performed by: INTERNAL MEDICINE

## 2022-09-06 PROCEDURE — 99213 OFFICE O/P EST LOW 20 MIN: CPT

## 2022-09-07 LAB
ALBUMIN SERPL-MCNC: 5 G/DL (ref 3.5–5.2)
ALBUMIN/GLOB SERPL: 3.1 G/DL
ALP SERPL-CCNC: 70 U/L (ref 39–117)
ALT SERPL-CCNC: 18 U/L (ref 1–41)
AST SERPL-CCNC: 19 U/L (ref 1–40)
BH CV ECHO MEAS - AO ROOT DIAM: 3.4 CM
BH CV ECHO MEAS - EF(MOD-BP): 56.3 %
BH CV ECHO MEAS - IVSD: 1.4 CM
BH CV ECHO MEAS - LA DIMENSION: 4.6 CM
BH CV ECHO MEAS - LAT PEAK E' VEL: 10.3 CM/SEC
BH CV ECHO MEAS - LVIDD: 4.8 CM
BH CV ECHO MEAS - LVIDS: 3 CM
BH CV ECHO MEAS - LVOT DIAM: 2.3 CM
BH CV ECHO MEAS - LVPWD: 1.4 CM
BH CV ECHO MEAS - MED PEAK E' VEL: 6.42 CM/SEC
BH CV ECHO MEAS - MV A MAX VEL: 65.5 CM/SEC
BH CV ECHO MEAS - MV DEC TIME: 191 MSEC
BH CV ECHO MEAS - MV E MAX VEL: 72.8 CM/SEC
BH CV ECHO MEAS - MV E/A: 1.1
BH CV ECHO MEAS - RVDD: 3.7 CM
BH CV ECHO MEAS - TR MAX PG: 21 MMHG
BH CV ECHO MEAS - TR MAX VEL: 231 CM/SEC
BH CV ECHO MEASUREMENTS AVERAGE E/E' RATIO: 8.71
BILIRUB SERPL-MCNC: 0.6 MG/DL (ref 0–1.2)
BUN SERPL-MCNC: 14 MG/DL (ref 8–23)
BUN/CREAT SERPL: 14.4 (ref 7–25)
CALCIUM SERPL-MCNC: 10 MG/DL (ref 8.6–10.5)
CHLORIDE SERPL-SCNC: 104 MMOL/L (ref 98–107)
CHOLEST SERPL-MCNC: 122 MG/DL (ref 0–200)
CO2 SERPL-SCNC: 29 MMOL/L (ref 22–29)
CREAT SERPL-MCNC: 0.97 MG/DL (ref 0.76–1.27)
EGFRCR-CYS SERPLBLD CKD-EPI 2021: 88.8 ML/MIN/1.73
GLOBULIN SER CALC-MCNC: 1.6 GM/DL
GLUCOSE SERPL-MCNC: 113 MG/DL (ref 65–99)
HBA1C MFR BLD: 6.7 % (ref 4.8–5.6)
HDLC SERPL-MCNC: 34 MG/DL (ref 40–60)
IMP & REVIEW OF LAB RESULTS: NORMAL
IVRT: 53 MSEC
LDLC SERPL CALC-MCNC: 61 MG/DL (ref 0–100)
LEFT ATRIUM VOLUME INDEX: 23.8 ML/M2
MAXIMAL PREDICTED HEART RATE: 159 BPM
POTASSIUM SERPL-SCNC: 4.8 MMOL/L (ref 3.5–5.2)
PROT SERPL-MCNC: 6.6 G/DL (ref 6–8.5)
SODIUM SERPL-SCNC: 143 MMOL/L (ref 136–145)
STRESS TARGET HR: 135 BPM
TRIGL SERPL-MCNC: 155 MG/DL (ref 0–150)
TSH SERPL DL<=0.005 MIU/L-ACNC: 4.61 UIU/ML (ref 0.27–4.2)
VLDLC SERPL CALC-MCNC: 27 MG/DL (ref 5–40)

## 2022-09-07 NOTE — TELEPHONE ENCOUNTER
Patient had appointment with Leeann OLEA went over his test results. From her note patient is to return in 6 months.

## 2022-09-09 NOTE — PROGRESS NOTES
LDL 61.  HDL 34.  Triglycerides 155.  Continue Crestor 20 mg a day and low-fat diet.  Hemoglobin A1c improved at 6.7%.  Diabetes well controlled.  TSH mildly elevated at 4.61.  Recheck thyroid function tests with next follow-up.  Normal serum calcium.  Copy of labs sent to Dr. Macias.  Please notify patient of results.

## 2022-10-14 DIAGNOSIS — E78.00 PURE HYPERCHOLESTEROLEMIA: ICD-10-CM

## 2022-10-14 RX ORDER — ROSUVASTATIN CALCIUM 20 MG/1
TABLET, COATED ORAL
Qty: 90 TABLET | Refills: 1 | Status: SHIPPED | OUTPATIENT
Start: 2022-10-14

## 2022-11-02 RX ORDER — BLOOD-GLUCOSE METER
KIT MISCELLANEOUS
Qty: 50 EACH | Refills: 2 | Status: SHIPPED | OUTPATIENT
Start: 2022-11-02 | End: 2023-04-03

## 2022-11-10 RX ORDER — BLOOD-GLUCOSE METER
KIT MISCELLANEOUS
OUTPATIENT
Start: 2022-11-10

## 2023-02-20 RX ORDER — LANCETS 28 GAUGE
EACH MISCELLANEOUS
Qty: 100 EACH | Refills: 3 | Status: SHIPPED | OUTPATIENT
Start: 2023-02-20

## 2023-02-23 ENCOUNTER — OFFICE VISIT (OUTPATIENT)
Dept: ENDOCRINOLOGY | Age: 63
End: 2023-02-23
Payer: COMMERCIAL

## 2023-02-23 VITALS
OXYGEN SATURATION: 96 % | BODY MASS INDEX: 28.44 KG/M2 | DIASTOLIC BLOOD PRESSURE: 80 MMHG | SYSTOLIC BLOOD PRESSURE: 118 MMHG | WEIGHT: 221.6 LBS | HEART RATE: 71 BPM | TEMPERATURE: 97 F | HEIGHT: 74 IN

## 2023-02-23 DIAGNOSIS — Z79.4 TYPE 2 DIABETES MELLITUS WITH HYPERGLYCEMIA, WITH LONG-TERM CURRENT USE OF INSULIN: ICD-10-CM

## 2023-02-23 DIAGNOSIS — E11.65 TYPE 2 DIABETES MELLITUS WITH HYPERGLYCEMIA, WITH LONG-TERM CURRENT USE OF INSULIN: ICD-10-CM

## 2023-02-23 DIAGNOSIS — E89.2 HISTORY OF PARATHYROIDECTOMY: ICD-10-CM

## 2023-02-23 DIAGNOSIS — I10 PRIMARY HYPERTENSION: ICD-10-CM

## 2023-02-23 DIAGNOSIS — G47.33 OSA (OBSTRUCTIVE SLEEP APNEA): ICD-10-CM

## 2023-02-23 DIAGNOSIS — E11.9 TYPE 2 DIABETES MELLITUS WITHOUT COMPLICATION, WITHOUT LONG-TERM CURRENT USE OF INSULIN: Primary | ICD-10-CM

## 2023-02-23 DIAGNOSIS — E78.5 HYPERLIPIDEMIA, UNSPECIFIED HYPERLIPIDEMIA TYPE: ICD-10-CM

## 2023-02-23 DIAGNOSIS — E55.9 VITAMIN D DEFICIENCY: ICD-10-CM

## 2023-02-23 PROCEDURE — 99214 OFFICE O/P EST MOD 30 MIN: CPT | Performed by: INTERNAL MEDICINE

## 2023-02-23 NOTE — PROGRESS NOTES
"Chief Complaint  Hypertension    Subjective          Agustina Taylor presents to [facility]IMARY [facility]  History of Present Illness  Patient comes in today says she knows her blood pressures been up for about the last month she has been noticing readings between 160/200 over about  she states she says she really does not know why does run in her family but she says she has been having some headaches with that she denies any other diffident problems at this time she says she has been told it is time to have her carotid Dopplers repeated she reports that she had mild disease in left  Hypertension  Pertinent negatives include no shortness of breath.       Objective   Vital Signs:   /100   Pulse 105   Ht 149.9 cm (59\")   Wt 66.9 kg (147 lb 6.4 oz)   SpO2 98%   BMI 29.77 kg/m²     Body mass index is 29.77 kg/m².    Review of Systems   Constitutional: Negative.    HENT: Negative for congestion, dental problem, ear discharge, ear pain and sore throat.    Respiratory: Negative for apnea, chest tightness and shortness of breath.    Gastrointestinal: Negative for constipation and nausea.   Endocrine: Negative for polyuria.   Genitourinary: Negative for difficulty urinating.   Musculoskeletal: Negative for arthralgias and gait problem.   Skin: Negative for rash.   Neurological: Positive for headache.   Hematological: Negative for adenopathy.       Past History:  Medical History: has a past medical history of Depression.   Surgical History: has no past surgical history on file.         Current Outpatient Medications:   •  Brexpiprazole (REXULTI PO), Take  by mouth., Disp: , Rfl:   •  busPIRone (BUSPAR) 30 MG tablet, Take 30 mg by mouth 2 (Two) Times a Day., Disp: , Rfl:   •  cyclobenzaprine (FLEXERIL) 10 MG tablet, Take 10 mg by mouth 3 (Three) Times a Day As Needed. for muscle spams, Disp: , Rfl:   •  hydrOXYzine pamoate (VISTARIL) 50 MG capsule, Take 50 mg by mouth 3 (Three) Times a Day As " Subjective   Noah Flannery is a 62 y.o. male.     History of Present Illness        Patient is a 62-year-old male who was came in for follow-up     He has known diabetes mellitus since 2016.  He is on Metformin 1000 mg BID and Jardiance 25 mg/day.  Insurance will no longer cover Farxiga.  He checks his blood sugar once a day.    Fasting glucose less than 148.  Post lunch glucose 127-180.  He denies hypoglycemic episodes.  He has gained 5 lbs since 9/22.     His last eye examination was in 3/22.  He denies eye complaints.  He has no retinopathy.  He denies numbness in his feet.  He has occasional tingling in feet.  He denies associated nephropathy.  Urine microalbumin is normal in April 2022     He has hyperlipidemia and is on Crestor 20 mg/day.  He denies myalgia.     He had right superior and left superior parathyroidectomy in 2017 by Dr. Dumont.  Pathology showed parathyroid adenoma.  He has no recurrence of hypercalcemia since then.  He had passed 2 kidney stones before the parathyroid surgery.     Has hypertension and intermittent palpitations.  He is on bisoprolol 5 mg/day prescribed by Dr. Selby.  He has no history of heart attack or stroke before.  He had normal myocardial perfusion study done in November 2019.     He has vitamin D deficiency and is on vitamin D3 1000 units/day.     He has sleep apnea and is sleeping well with the CPAP.  He follows with Dr. Bustillo.     He had colonoscopy done by Dr. Concepcion on August 2, 2022.  He had tubular adenomas and hyperplastic polyps removed.  He was advised repeat colonoscopy in 2025.    The following portions of the patient's history were reviewed and updated as appropriate: allergies, current medications, past family history, past medical history, past social history, past surgical history and problem list.    Review of Systems   Eyes: Negative for visual disturbance.   Respiratory: Negative for shortness of breath.    Cardiovascular: Negative for chest  "pain and palpitations.   Gastrointestinal: Negative.    Endocrine: Negative.    Genitourinary: Negative.    Musculoskeletal: Negative for myalgias.   Neurological: Negative for numbness.   Hematological: Negative for adenopathy.     Vitals:    02/23/23 1515   BP: 118/80   Pulse: 71   Temp: 97 °F (36.1 °C)   TempSrc: Temporal   SpO2: 96%   Weight: 101 kg (221 lb 9.6 oz)   Height: 188 cm (74\")      Objective   Physical Exam  Constitutional:       Appearance: Normal appearance.   Eyes:      General: No scleral icterus.        Right eye: No discharge.         Left eye: No discharge.      Extraocular Movements: Extraocular movements intact.      Conjunctiva/sclera: Conjunctivae normal.   Neck:      Vascular: No carotid bruit.   Cardiovascular:      Rate and Rhythm: Normal rate and regular rhythm.      Pulses: Normal pulses.      Heart sounds: Normal heart sounds.   Pulmonary:      Breath sounds: Normal breath sounds.   Abdominal:      General: Bowel sounds are normal. There is no distension.      Palpations: Abdomen is soft. There is no mass.      Tenderness: There is no right CVA tenderness or left CVA tenderness.   Musculoskeletal:      Cervical back: Normal range of motion and neck supple.      Comments: Multiple leg varicosities.  No calf tenderness.  No plantar ulcers.   Lymphadenopathy:      Cervical: No cervical adenopathy.   Neurological:      General: No focal deficit present.      Mental Status: He is alert and oriented to person, place, and time.      Comments: Intact light touch on lower ext       Office Visit on 09/06/2022   Component Date Value Ref Range Status   • Glucose 09/06/2022 113 (H)  65 - 99 mg/dL Final   • BUN 09/06/2022 14  8 - 23 mg/dL Final   • Creatinine 09/06/2022 0.97  0.76 - 1.27 mg/dL Final   • EGFR Result 09/06/2022 88.8  >60.0 mL/min/1.73 Final    Comment: National Kidney Foundation and American Society of  Nephrology (ASN) Task Force recommended calculation based  on the Chronic Kidney " Needed., Disp: , Rfl:   •  lamoTRIgine (LaMICtal) 200 MG tablet, Take 200 mg by mouth 2 (Two) Times a Day., Disp: , Rfl:   •  mirtazapine (REMERON) 15 MG tablet, Take 15 mg by mouth Every Night., Disp: , Rfl:   •  valsartan (Diovan) 160 MG tablet, Take 1 tablet by mouth Daily., Disp: 30 tablet, Rfl: 2    Allergies: Aspirin and Vraylar [cariprazine]    Physical Exam  Constitutional:       Appearance: Normal appearance.   HENT:      Head: Normocephalic and atraumatic.      Right Ear: Tympanic membrane, ear canal and external ear normal.      Left Ear: Tympanic membrane, ear canal and external ear normal.      Nose: Nose normal.      Mouth/Throat:      Mouth: Mucous membranes are moist.   Eyes:      Extraocular Movements: Extraocular movements intact.      Conjunctiva/sclera: Conjunctivae normal.      Pupils: Pupils are equal, round, and reactive to light.   Cardiovascular:      Rate and Rhythm: Normal rate and regular rhythm.   Pulmonary:      Effort: Pulmonary effort is normal.      Breath sounds: Normal breath sounds.   Abdominal:      General: Abdomen is flat. Bowel sounds are normal.      Palpations: Abdomen is soft.   Musculoskeletal:         General: Normal range of motion.   Feet:      Right foot:      Protective Sensation: 0 sites tested. 0 sites sensed.      Toenail Condition: Right toenails are normal.      Left foot:      Protective Sensation: 0 sites tested. 0 sites sensed.      Toenail Condition: Left toenails are normal.   Skin:     General: Skin is warm and dry.   Neurological:      General: No focal deficit present.      Mental Status: She is alert and oriented to person, place, and time. Mental status is at baseline.   Psychiatric:         Behavior: Behavior normal.         Thought Content: Thought content normal.         Judgment: Judgment normal.          Result Review :                   Assessment and Plan    Diagnoses and all orders for this visit:    1. Primary hypertension  (Primary)  Comments:  Recheck your blood pressure was 168/96 we will start Diovan 160 and recheck in approximately 2 to 3 weeks  Orders:  -     valsartan (Diovan) 160 MG tablet; Take 1 tablet by mouth Daily.  Dispense: 30 tablet; Refill: 2    2. PVD (peripheral vascular disease) (HCC)  Comments:  50 to 60% blockage in the right minimal in the left we will go ahead and get repeated carotid Dopplers  Orders:  -     Duplex Carotid Ultrasound CAR; Future              Follow Up   Return in about 3 weeks (around 10/10/2022).  Patient was given instructions and counseling regarding her condition or for health maintenance advice. Please see specific information pulled into the AVS if appropriate.     Saturnino Pena MD   Disease Epidemiology Collaboration  (CKD-EPI) equation refit without adjustment for race.  GFR Normal >60  Chronic Kidney Disease <60  Kidney Failure <15     • BUN/Creatinine Ratio 09/06/2022 14.4  7.0 - 25.0 Final   • Sodium 09/06/2022 143  136 - 145 mmol/L Final   • Potassium 09/06/2022 4.8  3.5 - 5.2 mmol/L Final   • Chloride 09/06/2022 104  98 - 107 mmol/L Final   • Total CO2 09/06/2022 29.0  22.0 - 29.0 mmol/L Final   • Calcium 09/06/2022 10.0  8.6 - 10.5 mg/dL Final   • Total Protein 09/06/2022 6.6  6.0 - 8.5 g/dL Final   • Albumin 09/06/2022 5.00  3.50 - 5.20 g/dL Final   • Globulin 09/06/2022 1.6  gm/dL Final   • A/G Ratio 09/06/2022 3.1  g/dL Final   • Total Bilirubin 09/06/2022 0.6  0.0 - 1.2 mg/dL Final   • Alkaline Phosphatase 09/06/2022 70  39 - 117 U/L Final   • AST (SGOT) 09/06/2022 19  1 - 40 U/L Final   • ALT (SGPT) 09/06/2022 18  1 - 41 U/L Final   • Total Cholesterol 09/06/2022 122  0 - 200 mg/dL Final    Comment: Cholesterol Reference Ranges  (U.S. Department of Health and Human Services ATP III  Classifications)  Desirable          <200 mg/dL  Borderline High    200-239 mg/dL  High Risk          >240 mg/dL  Triglyceride Reference Ranges  (U.S. Department of Health and Human Services ATP III  Classifications)  Normal           <150 mg/dL  Borderline High  150-199 mg/dL  High             200-499 mg/dL  Very High        >500 mg/dL  HDL Reference Ranges  (U.S. Department of Health and Human Services ATP III  Classifications)  Low     <40 mg/dl (major risk factor for CHD)  High    >60 mg/dl ('negative' risk factor for CHD)  LDL Reference Ranges  (U.S. Department of Health and Human Services ATP III  Classifications)  Optimal          <100 mg/dL  Near Optimal     100-129 mg/dL  Borderline High  130-159 mg/dL  High             160-189 mg/dL  Very High        >189 mg/dL     • Triglycerides 09/06/2022 155 (H)  0 - 150 mg/dL Final   • HDL Cholesterol 09/06/2022 34 (L)  40 - 60 mg/dL Final   • VLDL  Cholesterol Bryce 09/06/2022 27  5 - 40 mg/dL Final   • LDL Chol Calc ([facility]) 09/06/2022 61  0 - 100 mg/dL Final   • Hemoglobin A1C 09/06/2022 6.70 (H)  4.80 - 5.60 % Final    Comment: Hemoglobin A1C Ranges:  Increased Risk for Diabetes  5.7% to 6.4%  Diabetes                     >= 6.5%  Diabetic Goal                < 7.0%     • TSH 09/06/2022 4.610 (H)  0.270 - 4.200 uIU/mL Final   • Interpretation 09/06/2022 Note   Final    Supplemental report is available.     Assessment & Plan   Diagnoses and all orders for this visit:    1. Type 2 diabetes mellitus without complication, without long-term current use of insulin ([facility]) (Primary)  -     Comprehensive Metabolic Panel  -     Hemoglobin A1c  -     Lipid Panel  -     TSH    2. History of parathyroidectomy (CMS/[facility])    3. Hyperlipidemia, unspecified hyperlipidemia type  -     Comprehensive Metabolic Panel  -     Lipid Panel  -     TSH    4. Primary hypertension  -     Comprehensive Metabolic Panel    5. JACQUELYN (obstructive sleep apnea)  -     TSH    6. Vitamin D deficiency  -     Vitamin D,25-Hydroxy      Continue metformin 1000 mg BID and Jardiance 25 mg/day.  Continue Crestor 20 mg/day.  Continue bisoprolol per Dr. Selby.  Continue vit D3 1000 units/day.  Continue CPAP.    Copy of my note sent to Dr. Macias.    RTC 4 mos.

## 2023-02-24 LAB
25(OH)D3+25(OH)D2 SERPL-MCNC: 38.1 NG/ML (ref 30–100)
ALBUMIN SERPL-MCNC: 4.6 G/DL (ref 3.5–5.2)
ALBUMIN/GLOB SERPL: 2.7 G/DL
ALP SERPL-CCNC: 87 U/L (ref 39–117)
ALT SERPL-CCNC: 21 U/L (ref 1–41)
AST SERPL-CCNC: 17 U/L (ref 1–40)
BILIRUB SERPL-MCNC: 0.3 MG/DL (ref 0–1.2)
BUN SERPL-MCNC: 14 MG/DL (ref 8–23)
BUN/CREAT SERPL: 12.7 (ref 7–25)
CALCIUM SERPL-MCNC: 9.5 MG/DL (ref 8.6–10.5)
CHLORIDE SERPL-SCNC: 109 MMOL/L (ref 98–107)
CHOLEST SERPL-MCNC: 130 MG/DL (ref 0–200)
CO2 SERPL-SCNC: 28.5 MMOL/L (ref 22–29)
CREAT SERPL-MCNC: 1.1 MG/DL (ref 0.76–1.27)
EGFRCR SERPLBLD CKD-EPI 2021: 76.4 ML/MIN/1.73
GLOBULIN SER CALC-MCNC: 1.7 GM/DL
GLUCOSE SERPL-MCNC: 134 MG/DL (ref 65–99)
HBA1C MFR BLD: 6.9 % (ref 4.8–5.6)
HDLC SERPL-MCNC: 33 MG/DL (ref 40–60)
IMP & REVIEW OF LAB RESULTS: NORMAL
LDLC SERPL CALC-MCNC: 58 MG/DL (ref 0–100)
POTASSIUM SERPL-SCNC: 4.3 MMOL/L (ref 3.5–5.2)
PROT SERPL-MCNC: 6.3 G/DL (ref 6–8.5)
SODIUM SERPL-SCNC: 145 MMOL/L (ref 136–145)
TRIGL SERPL-MCNC: 239 MG/DL (ref 0–150)
TSH SERPL DL<=0.005 MIU/L-ACNC: 2.7 UIU/ML (ref 0.27–4.2)
VLDLC SERPL CALC-MCNC: 39 MG/DL (ref 5–40)

## 2023-02-26 NOTE — PROGRESS NOTES
Hemoglobin A1c 6.9%.  Diabetes well controlled.  LDL okay at 58.  HDL low at 33.  Nonfasting triglycerides 239.  Continue Crestor 20 mg a day and low-fat diet.  Normal vitamin D.  Continue vitamin D3 1000 units/day.  Normal thyroid function tests.  Copy of labs sent to Dr. Macias and to patient through [employer].

## 2023-03-08 ENCOUNTER — TELEPHONE (OUTPATIENT)
Dept: FAMILY MEDICINE CLINIC | Facility: CLINIC | Age: 63
End: 2023-03-08
Payer: COMMERCIAL

## 2023-03-08 NOTE — TELEPHONE ENCOUNTER
LVM that I am sending Mogi message and mailing results    ----- Message from Silver Duffy MD sent at 3/5/2023  7:29 PM EST -----  Patient has not viewed my chart notes.  Please mail results to patient.

## 2023-03-09 ENCOUNTER — OFFICE VISIT (OUTPATIENT)
Dept: CARDIOLOGY | Facility: CLINIC | Age: 63
End: 2023-03-09
Payer: COMMERCIAL

## 2023-03-09 VITALS
DIASTOLIC BLOOD PRESSURE: 72 MMHG | SYSTOLIC BLOOD PRESSURE: 110 MMHG | BODY MASS INDEX: 28.11 KG/M2 | HEIGHT: 74 IN | WEIGHT: 219 LBS | HEART RATE: 69 BPM

## 2023-03-09 DIAGNOSIS — E78.5 HYPERLIPIDEMIA, UNSPECIFIED HYPERLIPIDEMIA TYPE: ICD-10-CM

## 2023-03-09 DIAGNOSIS — R00.2 PALPITATIONS: Primary | ICD-10-CM

## 2023-03-09 DIAGNOSIS — I10 PRIMARY HYPERTENSION: ICD-10-CM

## 2023-03-09 PROCEDURE — 99213 OFFICE O/P EST LOW 20 MIN: CPT | Performed by: INTERNAL MEDICINE

## 2023-03-09 NOTE — PROGRESS NOTES
6 MONTH FOLLOW UP    Subjective:        Noah Flannery is a 62 y.o. male who here for follow up    CC  6 months follow-up for palpitation  HPI  62-year-old male here for the 6-month follow-up for the hypertension hyperlipidemia and palpitation denies any chest pains tightness heaviness or the pressure sensation     Problems Addressed this Visit        Cardiac and Vasculature    Palpitations - Primary    Primary hypertension    Hyperlipidemia   Diagnoses       Codes Comments    Palpitations    -  Primary ICD-10-CM: R00.2  ICD-9-CM: 785.1     Primary hypertension     ICD-10-CM: I10  ICD-9-CM: 401.9     Hyperlipidemia, unspecified hyperlipidemia type     ICD-10-CM: E78.5  ICD-9-CM: 272.4         .    The following portions of the patient's history were reviewed and updated as appropriate: allergies, current medications, past family history, past medical history, past social history, past surgical history and problem list.    Past Medical History:   Diagnosis Date   • Allergic 1980   • Arthritis 2000   • Broken ankle    • Broken ribs     right   • Colon polyp    • Deep vein thrombosis (HCC) 2012   • Diabetes mellitus (HCC)    • Hyperlipidemia    • Hypertension    • Sleep apnea      reports that he has never smoked. He has never been exposed to tobacco smoke. He has never used smokeless tobacco. He reports current alcohol use. He reports that he does not use drugs.   Family History   Problem Relation Age of Onset   • Diabetes Mother    • Hypertension Mother    • Heart disease Mother    • Lung cancer Mother    • Obesity Mother    • Liver cancer Mother    • Heart disease Father    • Lung cancer Father    • Obesity Sister    • Diabetes Sister    • Obesity Brother    • Kidney disease Brother    • Colon cancer Neg Hx    • Colon polyps Neg Hx        Review of Systems  Constitutional: No wt loss, fever, fatigue  Gastrointestinal: No nausea, abdominal pain  Behavioral/Psych: No insomnia or anxiety   Cardiovascular no chest  "pains or tightness in the chest  Objective:       Physical Exam           Physical Exam  /72   Pulse 69   Ht 188 cm (74\")   Wt 99.3 kg (219 lb)   BMI 28.12 kg/m²     General appearance: No acute changes   Eyes: Sclerae conjunctivae normal, pupils reactive   HENT: Atraumatic; oropharynx clear with moist mucous membranes and no mucosal ulcerations;  Neck: Trachea midline; NECK, supple, no thyromegaly or lymphadenopathy   Lungs: Normal size and shape, normal breath sounds, equal distribution of air, no rales and rhonchi   CV: S1-S2 regular, no murmurs, no rub, no gallop   Abdomen: Soft, nontender; no masses , no abnormal abdominal sounds   Extremities: No deformity , normal color , no peripheral edema   Skin: Normal temperature, turgor and texture; no rash, ulcers  Psych: Appropriate affect, alert and oriented to person, place and time           Procedures      Echocardiogram:        Current Outpatient Medications:   •  bisoprolol (ZEBeta) 5 MG tablet, TAKE ONE TABLET BY MOUTH DAILY, Disp: 30 tablet, Rfl: 3  •  Cetirizine HCl (ZYRTEC PO), Take 10 mg by mouth Daily. One time daily unless he feels very bad then he takes 2, Disp: , Rfl:   •  Cholecalciferol (Vitamin D-3) 25 MCG (1000 UT) capsule, Take 1,000 Units by mouth Daily., Disp: 60 capsule, Rfl:   •  empagliflozin (Jardiance) 25 MG tablet tablet, Take 1 tablet by mouth Daily., Disp: 90 tablet, Rfl: 2  •  FREESTYLE LITE test strip, USE TO TEST BLOOD SUGAR DAILY, Disp: 50 each, Rfl: 2  •  Lancets (freestyle) lancets, USE ONE LANCET TO TEST DAILY TO HELP MONITOR GLUCOSE, Disp: 100 each, Rfl: 3  •  levocetirizine (XYZAL) 5 MG tablet, Take 1 tablet by mouth Every Evening., Disp: , Rfl:   •  metFORMIN (GLUCOPHAGE) 1000 MG tablet, Take 1 tablet by mouth 2 (Two) Times a Day With Meals., Disp: 180 tablet, Rfl: 2  •  rosuvastatin (CRESTOR) 20 MG tablet, TAKE ONE TABLET BY MOUTH DAILY, Disp: 90 tablet, Rfl: 1  •  vitamin B-12 (CYANOCOBALAMIN) 500 MCG tablet, Take 1 " tablet by mouth Daily., Disp: , Rfl:    Assessment:        Patient Active Problem List   Diagnosis   • Personal history of colonic polyps   • Palpitations   • Primary hypertension   • Hypercalcemia   • Hyperlipidemia   • History of parathyroidectomy (CMS/[facility])   • JACQUELYN (obstructive sleep apnea)   • Type 2 diabetes mellitus without complication, without long-term current use of insulin ([facility])   • Pes planus   • Tendonitis, Achilles, right   • Arthritis of ankle               Plan:            ICD-10-CM ICD-9-CM   1. Palpitations  R00.2 785.1   2. Primary hypertension  I10 401.9   3. Hyperlipidemia, unspecified hyperlipidemia type  E78.5 272.4     1. Palpitations  Palpitations under control    2. Primary hypertension  Blood pressure under control    3. Hyperlipidemia, unspecified hyperlipidemia type  Continue current treatment       1 YR  COUNSELING:    Noah Petersen was given to patient for the following topics: diagnostic results, risk factor reductions, impressions, risks and benefits of treatment options and importance of treatment compliance .       SMOKING COUNSELING:        Dictated using Dragon dictation

## 2023-03-17 ENCOUNTER — OFFICE VISIT (OUTPATIENT)
Dept: FAMILY MEDICINE CLINIC | Facility: CLINIC | Age: 63
End: 2023-03-17
Payer: COMMERCIAL

## 2023-03-17 VITALS
HEIGHT: 74 IN | WEIGHT: 220.2 LBS | DIASTOLIC BLOOD PRESSURE: 80 MMHG | SYSTOLIC BLOOD PRESSURE: 110 MMHG | OXYGEN SATURATION: 98 % | HEART RATE: 62 BPM | BODY MASS INDEX: 28.26 KG/M2

## 2023-03-17 DIAGNOSIS — M15.1 HEBERDEN'S NODES OF RIGHT HAND: Primary | ICD-10-CM

## 2023-03-17 DIAGNOSIS — I10 PRIMARY HYPERTENSION: ICD-10-CM

## 2023-03-17 DIAGNOSIS — E78.00 PURE HYPERCHOLESTEROLEMIA: ICD-10-CM

## 2023-03-17 PROCEDURE — 99214 OFFICE O/P EST MOD 30 MIN: CPT | Performed by: INTERNAL MEDICINE

## 2023-03-21 NOTE — PROGRESS NOTES
03/17/2023    Assessment & Plan      This 62-year-old presents at this time for evaluation of a knot on his right pointing finger.  Patient has had surgery on his left rotator cuff and has done well.  He is concerned at this time about nodules at the tip of his right pointing finger.  I have discussed with him that these represent Heberden's nodes and I do not feel that further treatment is needed to this point.  The patient's hands demonstrate good  strength.  Range of motion of his hands are both within normal limits.  Strength against resistance is normal.  There is no tenderness to palpation of the joints.    Patient's blood pressure is 110/80 in the left arm sitting position standard cuff.  No changes needed in his blood pressure medication.  There are no diagnoses linked to this encounter.         CC: Arthritis (Left index finger---no other issues)  .        HPI  History of Present Illness     Subjective   Noah Flannery is a 62 y.o. male.      The following portions of the patient's history were reviewed and updated as appropriate: allergies, current medications, past family history, past medical history, past social history, past surgical history and problem list.    Problem List  Patient Active Problem List   Diagnosis   • Personal history of colonic polyps   • Palpitations   • Hypertension   • Hypercalcemia   • Hyperlipidemia   • History of parathyroidectomy (CMS/[facility])   • JACQUELYN (obstructive sleep apnea)   • Type 2 diabetes mellitus without complication, without long-term current use of insulin ([facility])   • Pes planus   • Tendonitis, Achilles, right   • Arthritis of ankle       Past Medical History  Past Medical History:   Diagnosis Date   • Allergic 1980   • Arthritis 2000   • Broken ankle    • Broken ribs     right   • Colon polyp    • Deep vein thrombosis ([facility]) 2012   • Diabetes mellitus ([facility])    • Hyperlipidemia    • Hypertension    • Sleep apnea        Surgical History  Past Surgical History:    Procedure Laterality Date   • ABLATION OF DYSRHYTHMIC FOCUS  2015   • ANKLE SURGERY     • APPENDECTOMY  aoril 23 79   • COLONOSCOPY     • COLONOSCOPY N/A 07/30/2019    Procedure: COLONOSCOPY to cecum into TI with cold biopsy and cold snare polypectomies;  Surgeon: Thomas Concepcion MD;  Location: [facility] ENDOSCOPY;  Service: Gastroenterology   • COLONOSCOPY N/A 08/02/2022    Procedure: COLONOSCOPY with Polypectomy;  Surgeon: Thomas Concepcion MD;  Location: [facility];  Service: Gastroenterology;  Laterality: N/A;  Transverse polyp x2, ascending colon polyp   • FRACTURE SURGERY  03/10/1990    Rt ankle   • HERNIA REPAIR     • JOINT REPLACEMENT  2020    Both shoulders   • PARATHYROID GLAND SURGERY  2017    Right superior and left superior parathyroidectomy for adenoma.  Dr. Vivian Jackson   • TOTAL SHOULDER REPLACEMENT Left    • TOTAL SHOULDER REPLACEMENT Right 11/18/2020   • UMBILICAL HERNIA REPAIR  2017       Family History  Family History   Problem Relation Age of Onset   • Diabetes Mother    • Hypertension Mother    • Heart disease Mother    • Lung cancer Mother    • Obesity Mother    • Liver cancer Mother    • Heart disease Father    • Lung cancer Father    • Obesity Sister    • Diabetes Sister    • Obesity Brother    • Kidney disease Brother    • Colon cancer Neg Hx    • Colon polyps Neg Hx        Social History  Social History    Tobacco Use      Smoking status: Never      Smokeless tobacco: Never       Is the Patient a current tobacco user? No    Allergies  No Known Allergies    Current Medications    Current Outpatient Medications:   •  bisoprolol (ZEBeta) 5 MG tablet, TAKE ONE TABLET BY MOUTH DAILY, Disp: 30 tablet, Rfl: 3  •  Cetirizine HCl (ZYRTEC PO), Take 10 mg by mouth Daily. One time daily unless he feels very bad then he takes 2, Disp: , Rfl:   •  Cholecalciferol (Vitamin D-3) 25 MCG (1000 UT) capsule, Take 1,000 Units by mouth Daily., Disp: 60 capsule, Rfl:   •  empagliflozin  (Jardiance) 25 MG tablet tablet, Take 1 tablet by mouth Daily., Disp: 90 tablet, Rfl: 2  •  FREESTYLE LITE test strip, USE TO TEST BLOOD SUGAR DAILY, Disp: 50 each, Rfl: 2  •  Lancets (freestyle) lancets, USE ONE LANCET TO TEST DAILY TO HELP MONITOR GLUCOSE, Disp: 100 each, Rfl: 3  •  levocetirizine (XYZAL) 5 MG tablet, Take 1 tablet by mouth Every Evening., Disp: , Rfl:   •  metFORMIN (GLUCOPHAGE) 1000 MG tablet, Take 1 tablet by mouth 2 (Two) Times a Day With Meals., Disp: 180 tablet, Rfl: 2  •  rosuvastatin (CRESTOR) 20 MG tablet, TAKE ONE TABLET BY MOUTH DAILY, Disp: 90 tablet, Rfl: 1  •  vitamin B-12 (CYANOCOBALAMIN) 500 MCG tablet, Take 1 tablet by mouth Daily., Disp: , Rfl:      Review of System  Review of Systems  I have reviewed and confirmed the accuracy of the ROS as documented by the MA/LPN/RN Rashad Macias MD    Vitals:    03/17/23 1342   BP: 110/80   Pulse: 62   SpO2: 98%     Body mass index is 28.27 kg/m².    Objective     Physical Exam  Physical Exam  Constitutional:       General: He is not in acute distress.     Appearance: Normal appearance.   HENT:      Head: Normocephalic and atraumatic.   Cardiovascular:      Rate and Rhythm: Normal rate and regular rhythm.   Pulmonary:      Effort: Pulmonary effort is normal. No respiratory distress.      Breath sounds: Normal breath sounds. No wheezing, rhonchi or rales.   Musculoskeletal:      Comments: Heberden's nodes right pointing finger   Neurological:      General: No focal deficit present.      Mental Status: He is alert and oriented to person, place, and time.   Psychiatric:         Mood and Affect: Mood normal.         Behavior: Behavior normal.         Thought Content: Thought content normal.         Judgment: Judgment normal.             Rashad Macias MD  03/17/2023

## 2023-04-03 RX ORDER — BLOOD-GLUCOSE METER
KIT MISCELLANEOUS
Qty: 50 EACH | Refills: 2 | Status: SHIPPED | OUTPATIENT
Start: 2023-04-03

## 2023-04-12 DIAGNOSIS — E78.00 PURE HYPERCHOLESTEROLEMIA: ICD-10-CM

## 2023-04-12 RX ORDER — BISOPROLOL FUMARATE 5 MG/1
TABLET, FILM COATED ORAL
Qty: 30 TABLET | Refills: 3 | Status: SHIPPED | OUTPATIENT
Start: 2023-04-12 | End: 2023-04-13

## 2023-04-13 RX ORDER — BISOPROLOL FUMARATE 5 MG/1
TABLET, FILM COATED ORAL
Qty: 30 TABLET | Refills: 3 | Status: SHIPPED | OUTPATIENT
Start: 2023-04-13

## 2023-04-13 RX ORDER — ROSUVASTATIN CALCIUM 20 MG/1
TABLET, COATED ORAL
Qty: 90 TABLET | Refills: 1 | Status: SHIPPED | OUTPATIENT
Start: 2023-04-13

## 2023-06-23 PROBLEM — E55.9 VITAMIN D DEFICIENCY: Status: ACTIVE | Noted: 2023-06-23

## 2023-08-17 ENCOUNTER — HOSPITAL ENCOUNTER (OUTPATIENT)
Dept: GENERAL RADIOLOGY | Facility: HOSPITAL | Age: 63
Discharge: HOME | End: 2023-08-17
Admitting: INTERNAL MEDICINE
Payer: COMMERCIAL

## 2023-08-17 ENCOUNTER — OFFICE VISIT (OUTPATIENT)
Dept: FAMILY MEDICINE CLINIC | Facility: CLINIC | Age: 63
End: 2023-08-17
Payer: COMMERCIAL

## 2023-08-17 VITALS
DIASTOLIC BLOOD PRESSURE: 78 MMHG | WEIGHT: 216 LBS | HEIGHT: 74 IN | SYSTOLIC BLOOD PRESSURE: 110 MMHG | BODY MASS INDEX: 27.72 KG/M2

## 2023-08-17 DIAGNOSIS — M25.562 CHRONIC PAIN OF LEFT KNEE: Primary | ICD-10-CM

## 2023-08-17 DIAGNOSIS — I10 PRIMARY HYPERTENSION: ICD-10-CM

## 2023-08-17 DIAGNOSIS — G89.29 CHRONIC PAIN OF LEFT KNEE: Primary | ICD-10-CM

## 2023-08-17 PROCEDURE — 99214 OFFICE O/P EST MOD 30 MIN: CPT | Performed by: INTERNAL MEDICINE

## 2023-08-17 PROCEDURE — 73560 X-RAY EXAM OF KNEE 1 OR 2: CPT

## 2023-09-07 RX ORDER — BLOOD-GLUCOSE METER
KIT MISCELLANEOUS
Qty: 50 EACH | Refills: 10 | Status: SHIPPED | OUTPATIENT
Start: 2023-09-07

## 2023-09-24 NOTE — PROGRESS NOTES
08/17/2023    Assessment & Plan   This 63-year-old patient presents at this time for follow-up of hypertension.  He relates he is feeling well although has had some left knee pain off and on for the past month or so.  His blood pressure was initially 110/78 but on recheck by me in the left arm sitting position standard cuff was 126/80.  Note is made the patient is lost 4 pounds from his last visit.  With his last visit his lisinopril was discontinued and amlodipine 5 mg was started.    In the interim of visits he was seen by Dr. Clements dermatologist.    Review of his comprehensive metabolic profile done on 2/23 as well as his CBC shows this to be essentially within normal limits.    His weight is down 3 pounds from his last visit down to 216.    He relates that the pain in his left knee is more aching in nature and is more of a inconvenience at this point.  He denies any recent trauma or falls.  He does not want a referral to further evaluation of the pain in his left knee.  Note is made he ambulates without difficulty and range of motion of the left knee is entirely within normal limits.  There is no tenderness appreciated with examination of the medial and lateral collateral ligaments.      Diagnoses and all orders for this visit:    1. Chronic pain of left knee (Primary)  -     XR Knee 1 or 2 View Left (In Office)    2. Primary hypertension             CC: Hypertension (F/U) and Knee Pain (Left knee tightness.  Started about 1 month ago.---no other issues)  .        HPI  Hypertension  Pertinent negatives include no blurred vision, chest pain, neck pain, palpitations or shortness of breath.   Knee Pain        Subjective   Noah Flannery is a 63 y.o. male.      The following portions of the patient's history were reviewed and updated as appropriate: allergies, current medications, past family history, past medical history, past social history, past surgical history, and problem list.    Problem List  Patient  Active Problem List   Diagnosis    Personal history of colonic polyps    Palpitations    Primary hypertension    Hypercalcemia    Hyperlipidemia    History of parathyroidectomy    JACQUELYN (obstructive sleep apnea)    Type 2 diabetes mellitus without complication, without long-term current use of insulin    Pes planus    Tendonitis, Achilles, right    Arthritis of ankle    Vitamin D deficiency       Past Medical History  Past Medical History:   Diagnosis Date    Allergic 1980    Arthritis 2000    Broken ankle     Broken ribs     right    Colon polyp     Deep vein thrombosis 2012    Diabetes mellitus     Hyperlipidemia     Hypertension     Sleep apnea     Vitamin D deficiency 6/23/2023       Surgical History  Past Surgical History:   Procedure Laterality Date    ABLATION OF DYSRHYTHMIC FOCUS  2015    ANKLE SURGERY      APPENDECTOMY  aoril 23 79    COLONOSCOPY      COLONOSCOPY N/A 07/30/2019    Procedure: COLONOSCOPY to cecum into TI with cold biopsy and cold snare polypectomies;  Surgeon: Thomas Concepcion MD;  Location: [facility] ENDOSCOPY;  Service: Gastroenterology    COLONOSCOPY N/A 08/02/2022    Procedure: COLONOSCOPY with Polypectomy;  Surgeon: Thomas Concepcion MD;  [place] [facility] [place];  Service: Gastroenterology;  Laterality: N/A;  Transverse polyp x2, ascending colon polyp    FRACTURE SURGERY  03/10/1990    Rt ankle    HERNIA REPAIR      JOINT REPLACEMENT  2020    Both shoulders    PARATHYROID GLAND SURGERY  2017    Right superior and left superior parathyroidectomy for adenoma.  Dr. Dumont.  [place]    TOTAL SHOULDER REPLACEMENT Left     TOTAL SHOULDER REPLACEMENT Right 11/18/2020    UMBILICAL HERNIA REPAIR  2017       Family History  Family History   Problem Relation Age of Onset    Diabetes Mother     Hypertension Mother     Heart disease Mother     Lung cancer Mother     Obesity Mother     Liver cancer Mother     Heart disease Father     Lung cancer Father     Obesity Sister     Diabetes  Sister     Obesity Brother     Kidney disease Brother     Colon cancer Neg Hx     Colon polyps Neg Hx        Social History  Social History    Tobacco Use      Smoking status: Never      Smokeless tobacco: Never       Is the Patient a current tobacco user? No    Allergies  No Known Allergies    Current Medications    Current Outpatient Medications:     bisoprolol (ZEBeta) 5 MG tablet, TAKE ONE TABLET BY MOUTH DAILY, Disp: 30 tablet, Rfl: 3    Cetirizine HCl (ZYRTEC PO), Take 10 mg by mouth Daily. One time daily unless he feels very bad then he takes 2, Disp: , Rfl:     Cholecalciferol (Vitamin D-3) 25 MCG (1000 UT) capsule, Take 1,000 Units by mouth Daily., Disp: 60 capsule, Rfl:     empagliflozin (Jardiance) 25 MG tablet tablet, Take 1 tablet by mouth Daily., Disp: 90 tablet, Rfl: 1    Lancets (freestyle) lancets, USE ONE LANCET TO TEST DAILY TO HELP MONITOR GLUCOSE, Disp: 100 each, Rfl: 3    levocetirizine (XYZAL) 5 MG tablet, Take 1 tablet by mouth Every Evening., Disp: , Rfl:     metFORMIN (GLUCOPHAGE) 1000 MG tablet, Take 1 tablet by mouth 2 (Two) Times a Day With Meals., Disp: 180 tablet, Rfl: 2    rosuvastatin (CRESTOR) 20 MG tablet, TAKE ONE TABLET BY MOUTH DAILY, Disp: 90 tablet, Rfl: 1    vitamin B-12 (CYANOCOBALAMIN) 500 MCG tablet, Take 1 tablet by mouth Daily., Disp: , Rfl:     glucose blood (FREESTYLE LITE) test strip, USE TO TEST BLOOD SUGAR DAILY, Disp: 50 each, Rfl: 10     Review of System  Review of Systems   Eyes:  Negative for blurred vision.   Respiratory:  Negative for shortness of breath.    Cardiovascular:  Negative for chest pain and palpitations.   Musculoskeletal:  Negative for neck pain.   I have reviewed and confirmed the accuracy of the ROS as documented by the MA/LPN/RN Rashad Macias MD    Vitals:    08/17/23 1037   BP: 110/78     Body mass index is 27.73 kg/m².    Objective     Physical Exam  Physical Exam  Constitutional:       General: He is not in acute distress.      Appearance: Normal appearance.   HENT:      Head: Normocephalic and atraumatic.   Cardiovascular:      Rate and Rhythm: Normal rate and regular rhythm.   Pulmonary:      Effort: Pulmonary effort is normal. No respiratory distress.      Breath sounds: Normal breath sounds. No wheezing, rhonchi or rales.   Neurological:      General: No focal deficit present.      Mental Status: He is alert and oriented to person, place, and time.   Psychiatric:         Mood and Affect: Mood normal.         Behavior: Behavior normal.         Thought Content: Thought content normal.         Judgment: Judgment normal.           Rashad Macias MD  08/17/2023Answers submitted by the patient for this visit:  Primary Reason for Visit (Submitted on 8/12/2023)  What is the primary reason for your visit?: High Blood Pressure

## 2023-10-09 DIAGNOSIS — E78.00 PURE HYPERCHOLESTEROLEMIA: ICD-10-CM

## 2023-10-09 RX ORDER — ROSUVASTATIN CALCIUM 20 MG/1
TABLET, COATED ORAL
Qty: 90 TABLET | Refills: 1 | Status: SHIPPED | OUTPATIENT
Start: 2023-10-09

## 2023-10-26 ENCOUNTER — OFFICE VISIT (OUTPATIENT)
Dept: ENDOCRINOLOGY | Age: 63
End: 2023-10-26
Payer: COMMERCIAL

## 2023-10-26 VITALS
BODY MASS INDEX: 27.75 KG/M2 | TEMPERATURE: 98.3 F | SYSTOLIC BLOOD PRESSURE: 116 MMHG | WEIGHT: 216.2 LBS | DIASTOLIC BLOOD PRESSURE: 70 MMHG | HEIGHT: 74 IN | HEART RATE: 60 BPM | OXYGEN SATURATION: 95 %

## 2023-10-26 DIAGNOSIS — E55.9 VITAMIN D DEFICIENCY: ICD-10-CM

## 2023-10-26 DIAGNOSIS — I10 PRIMARY HYPERTENSION: ICD-10-CM

## 2023-10-26 DIAGNOSIS — E78.5 HYPERLIPIDEMIA, UNSPECIFIED HYPERLIPIDEMIA TYPE: ICD-10-CM

## 2023-10-26 DIAGNOSIS — E11.9 TYPE 2 DIABETES MELLITUS WITHOUT COMPLICATION, WITHOUT LONG-TERM CURRENT USE OF INSULIN: Primary | ICD-10-CM

## 2023-10-26 PROCEDURE — 99214 OFFICE O/P EST MOD 30 MIN: CPT | Performed by: NURSE PRACTITIONER

## 2023-10-26 NOTE — PROGRESS NOTES
Chief Complaint  Chief Complaint   Patient presents with    Diabetes     Type 2: Pt does have meter today, unable to download, is up to date on eye exam, no hx of retinopathy or neuropathy.        Subjective          History of Present Illness    Noah Flannery 63 y.o. presents for a follow-up evaluation for type 2 DM     He has been diabetic since 2016     Pt is on the following medications for their DM: Metformin 1,000 mg twice daily and Jardiance 25 mg daily.          Insurance will no longer cover Farxiga        Denies diarrhea, constipation, chest pain, shortness of breath, vision changes or numbness and tingling in feet/hands.    Weight loss of 3 lbs since last visit.    Pt does not have a history of diabetic retinopathy.  Last eye exam was 04/23     Pt does not have a history of nephropathy.  Patient is not currently taking ACE/ARB     Pt does not have neuropathy.      Pt does not have a history of CAD or CVA.    Last A1C in 06/23 was 7.1    Last microalbumin in 06/23 was negative          Blood Sugars    Blood glucoses are checked 1/day.    Post-lunch blood glucoses: 130s - 150s    Pt has no episodes of hypoglycemia.          Pt is going to gym couple days a week          Hyperlipidemia     Pt denies any muscle/body aches, chest pain or shortness of breath    Pt is currently taking Crestor 20 mg HS     Last lipid panel in 06/23 showed Total 129, HDL 35, LDL 68 and Triglycerides 148            Hypertension    Pt denies any chest pain, palpitations, shortness of breath or headache    Current regimen includes bisoprolol 5 mg daily      Follows with Dr. Selby                     Vitamin D Deficiency     Current treatment includes 1,000 units daily     Last Vit D level was 38.1 in 02/23                   History of Hyperparathyroidism     He had right superior and left superior parathyroidectomy in 2017 by Dr. Dumont.  Pathology showed parathyroid adenoma.  He has no recurrence of hypercalcemia since  "then.  He had passed 2 kidney stones before the parathyroid surgery.      Last Calcium was 9.8 in 06/23            I have reviewed the patient's allergies, medicines, past medical hx, family hx and social hx.    Objective   Vital Signs:   /70   Pulse 60   Temp 98.3 °F (36.8 °C) (Oral)   Ht 188 cm (74.02\")   Wt 98.1 kg (216 lb 3.2 oz)   SpO2 95%   BMI 27.75 kg/m²       Physical Exam   Physical Exam  Constitutional:       General: He is not in acute distress.     Appearance: Normal appearance. He is not diaphoretic.   HENT:      Head: Normocephalic and atraumatic.   Eyes:      General:         Right eye: No discharge.         Left eye: No discharge.   Skin:     General: Skin is warm and dry.   Neurological:      Mental Status: He is alert.   Psychiatric:         Mood and Affect: Mood normal.         Behavior: Behavior normal.                    Results Review:   Hemoglobin A1C   Date Value Ref Range Status   06/23/2023 7.10 (H) 4.80 - 5.60 % Final     Comment:     Hemoglobin A1C Ranges:  Increased Risk for Diabetes  5.7% to 6.4%  Diabetes                     >= 6.5%  Diabetic Goal                < 7.0%       Triglycerides   Date Value Ref Range Status   06/23/2023 148 0 - 150 mg/dL Final     HDL Cholesterol   Date Value Ref Range Status   06/23/2023 35 (L) 40 - 60 mg/dL Final     LDL Chol Calc (NIH)   Date Value Ref Range Status   06/23/2023 68 0 - 100 mg/dL Final     VLDL Cholesterol Bryce   Date Value Ref Range Status   06/23/2023 26 5 - 40 mg/dL Final         Assessment and Plan {CC Problem List  Visit Diagnosis  ROS  Review (Popup)  Health Maintenance  Quality  BestPractice  Medications  SmartSets  SnapShot Encounters  Media :23  Diagnoses and all orders for this visit:    1. Type 2 diabetes mellitus without complication, without long-term current use of insulin (Primary)  -     Hemoglobin A1c  -     Comprehensive Metabolic Panel    Check labs today  Continue with Metformin 1,000 mg twice " daily and Jardiance 25 mg daily.    Continue with BG checks      2. Hyperlipidemia, unspecified hyperlipidemia type  -     Comprehensive Metabolic Panel  -     Lipid Panel    Check labs today  Continue with statin    3. Primary hypertension  -     Comprehensive Metabolic Panel    Stable  Continue with current medication regimen  Defer management to PCP/cardiology      4. Vitamin D deficiency  -     Vitamin D,25-Hydroxy    Check labs today  Continue with supplement        No refills needed at this time        Labs today  RTC on 02/23/24 with Dr. Duffy      Follow Up     Patient was given instructions and counseling regarding her condition or for health maintenance advice. Please see specific information pulled into the AVS if appropriate.              Joyce Rosas, EMMIE  10/26/23

## 2023-10-27 LAB
25(OH)D3+25(OH)D2 SERPL-MCNC: 44.5 NG/ML (ref 30–100)
ALBUMIN SERPL-MCNC: 5.2 G/DL (ref 3.5–5.2)
ALBUMIN/GLOB SERPL: 2.9 G/DL
ALP SERPL-CCNC: 77 U/L (ref 39–117)
ALT SERPL-CCNC: 25 U/L (ref 1–41)
AST SERPL-CCNC: 20 U/L (ref 1–40)
BILIRUB SERPL-MCNC: 0.6 MG/DL (ref 0–1.2)
BUN SERPL-MCNC: 13 MG/DL (ref 8–23)
BUN/CREAT SERPL: 12.7 (ref 7–25)
CALCIUM SERPL-MCNC: 10.3 MG/DL (ref 8.6–10.5)
CHLORIDE SERPL-SCNC: 104 MMOL/L (ref 98–107)
CHOLEST SERPL-MCNC: 132 MG/DL (ref 0–200)
CO2 SERPL-SCNC: 30.6 MMOL/L (ref 22–29)
CREAT SERPL-MCNC: 1.02 MG/DL (ref 0.76–1.27)
EGFRCR SERPLBLD CKD-EPI 2021: 83.1 ML/MIN/1.73
GLOBULIN SER CALC-MCNC: 1.8 GM/DL
GLUCOSE SERPL-MCNC: 137 MG/DL (ref 65–99)
HBA1C MFR BLD: 7.2 % (ref 4.8–5.6)
HDLC SERPL-MCNC: 38 MG/DL (ref 40–60)
IMP & REVIEW OF LAB RESULTS: NORMAL
LDLC SERPL CALC-MCNC: 74 MG/DL (ref 0–100)
POTASSIUM SERPL-SCNC: 4.6 MMOL/L (ref 3.5–5.2)
PROT SERPL-MCNC: 7 G/DL (ref 6–8.5)
SODIUM SERPL-SCNC: 143 MMOL/L (ref 136–145)
TRIGL SERPL-MCNC: 109 MG/DL (ref 0–150)
VLDLC SERPL CALC-MCNC: 20 MG/DL (ref 5–40)

## 2023-12-06 RX ORDER — BISOPROLOL FUMARATE 5 MG/1
TABLET, FILM COATED ORAL
Qty: 30 TABLET | Refills: 6 | Status: SHIPPED | OUTPATIENT
Start: 2023-12-06

## 2023-12-31 DIAGNOSIS — E11.65 TYPE 2 DIABETES MELLITUS WITH HYPERGLYCEMIA, WITH LONG-TERM CURRENT USE OF INSULIN: ICD-10-CM

## 2023-12-31 DIAGNOSIS — Z79.4 TYPE 2 DIABETES MELLITUS WITH HYPERGLYCEMIA, WITH LONG-TERM CURRENT USE OF INSULIN: ICD-10-CM

## 2024-01-02 NOTE — TELEPHONE ENCOUNTER
Rx Refill Note  Requested Prescriptions     Pending Prescriptions Disp Refills    metFORMIN (GLUCOPHAGE) 1000 MG tablet [Pharmacy Med Name: metFORMIN HCL 1,000 MG TABLET] 180 tablet 2     Sig: TAKE ONE TABLET BY MOUTH TWICE A DAY WITH MEALS      Last office visit with prescribing clinician: 2/23/2023   Last telemedicine visit with prescribing clinician: Visit date not found   Next office visit with prescribing clinician: 2/23/2024                         Would you like a call back once the refill request has been completed: [] Yes [] No    If the office needs to give you a call back, can they leave a voicemail: [] Yes [] No    Corin Espinoza MA  01/02/24, 09:04 EST

## 2024-02-15 ENCOUNTER — OFFICE VISIT (OUTPATIENT)
Dept: FAMILY MEDICINE CLINIC | Facility: CLINIC | Age: 64
End: 2024-02-15
Payer: COMMERCIAL

## 2024-02-15 VITALS
HEART RATE: 69 BPM | SYSTOLIC BLOOD PRESSURE: 122 MMHG | HEIGHT: 74 IN | OXYGEN SATURATION: 95 % | BODY MASS INDEX: 27.75 KG/M2 | WEIGHT: 216.2 LBS | DIASTOLIC BLOOD PRESSURE: 78 MMHG

## 2024-02-15 DIAGNOSIS — E78.00 PURE HYPERCHOLESTEROLEMIA: ICD-10-CM

## 2024-02-15 DIAGNOSIS — E11.9 TYPE 2 DIABETES MELLITUS WITHOUT COMPLICATION, WITHOUT LONG-TERM CURRENT USE OF INSULIN: Primary | ICD-10-CM

## 2024-02-15 DIAGNOSIS — I10 PRIMARY HYPERTENSION: ICD-10-CM

## 2024-02-15 LAB
EXPIRATION DATE: ABNORMAL
HBA1C MFR BLD: 6.8 % (ref 4.5–5.7)
Lab: ABNORMAL

## 2024-02-22 NOTE — PROGRESS NOTES
02/15/2024    Assessment & Plan     This pleasant 63-year-old presents at this time accompanied by his wife who gives historical information.  Patient relates he is feeling well and has noted normal glucose levels.  He has a history of diabetes mellitus type 2 and hypertension.  His blood pressure shows good control today at 122/78 in the left arm sitting position standard cuff.  His weight is essentially unchanged over the past 6 months.        His hemoglobin A1c today was 6.8 compared to his prior level to 7.23 months ago.  He has been followed by endocrinology and will be following up with him in the next several weeks.    Review of his labs shows that his last lipid level was done on 10/26 when he demonstrated a total cholesterol of 132 and a LDL of 74.  His HDL was slightly depressed at 38.      Diagnoses and all orders for this visit:    1. Type 2 diabetes mellitus without complication, without long-term current use of insulin (Primary)  -     POC Glycosylated Hemoglobin (Hb A1C)    2. Pure hypercholesterolemia    3. Primary hypertension         Plan:  1.)  Repeat lipid profile today.  2.)  Schedule for follow-up in about 4 to 5 months with physical examination.         CC: Diabetes (6 month follw up )  .        HPI  Diabetes  Pertinent negatives for hypoglycemia include no confusion, speech difficulty or tremors. Pertinent negatives for diabetes include no blurred vision, no polydipsia, no polyuria and no weight loss.        Subjective   Noah Flannery is a 63 y.o. male.      The following portions of the patient's history were reviewed and updated as appropriate: allergies, current medications, past family history, past medical history, past social history, past surgical history, and problem list.    Problem List  Patient Active Problem List   Diagnosis    Personal history of colonic polyps    Palpitations    Primary hypertension    Hypercalcemia    Hyperlipidemia    History of parathyroidectomy    JACQUELYN  (obstructive sleep apnea)    Type 2 diabetes mellitus without complication, without long-term current use of insulin    Pes planus    Tendonitis, Achilles, right    Arthritis of ankle    Vitamin D deficiency       Past Medical History  Past Medical History:   Diagnosis Date    Allergic 1980    Arthritis 2000    Broken ankle     Broken ribs     right    Colon polyp     Deep vein thrombosis 2012    Diabetes mellitus     Hyperlipidemia     Hypertension     Sleep apnea     Type 2 diabetes mellitus without complication, without long-term current use of insulin 03/10/2021    Vitamin D deficiency 06/23/2023       Surgical History  Past Surgical History:   Procedure Laterality Date    ABLATION OF DYSRHYTHMIC FOCUS  2015    ANKLE SURGERY      APPENDECTOMY  aoril 23 79    COLONOSCOPY      COLONOSCOPY N/A 07/30/2019    Procedure: COLONOSCOPY to cecum into TI with cold biopsy and cold snare polypectomies;  Surgeon: Thomas Concepcion MD;  Location: [facility] ENDOSCOPY;  Service: Gastroenterology    COLONOSCOPY N/A 08/02/2022    Procedure: COLONOSCOPY with Polypectomy;  Surgeon: Thomas Concepcion MD;  [place] [facility] [place];  Service: Gastroenterology;  Laterality: N/A;  Transverse polyp x2, ascending colon polyp    FRACTURE SURGERY  03/10/1990    Rt ankle    HERNIA REPAIR      INGUINAL HERNIA REPAIR  1998    JOINT REPLACEMENT  2020    Both shoulders    PARATHYROID GLAND SURGERY  2017    Right superior and left superior parathyroidectomy for adenoma.  Dr. Dumont.  [place]    TOTAL SHOULDER REPLACEMENT Left     TOTAL SHOULDER REPLACEMENT Right 11/18/2020    UMBILICAL HERNIA REPAIR  2017       Family History  Family History   Problem Relation Age of Onset    Diabetes Mother     Hypertension Mother     Heart disease Mother     Lung cancer Mother     Obesity Mother     Liver cancer Mother     Depression Mother     Heart disease Father     Lung cancer Father     Arthritis Father     Cancer Father     Obesity Sister      Diabetes Sister     Obesity Brother     Kidney disease Brother     Colon cancer Neg Hx     Colon polyps Neg Hx        Social History  Social History    Tobacco Use      Smoking status: Never      Smokeless tobacco: Never       Is the Patient a current tobacco user? No    Allergies  No Known Allergies    Current Medications    Current Outpatient Medications:     bisoprolol (ZEBeta) 5 MG tablet, TAKE ONE TABLET BY MOUTH DAILY, Disp: 30 tablet, Rfl: 6    Cetirizine HCl (ZYRTEC PO), Take 10 mg by mouth Daily. One time daily unless he feels very bad then he takes 2, Disp: , Rfl:     Cholecalciferol (Vitamin D-3) 25 MCG (1000 UT) capsule, Take 1,000 Units by mouth Daily., Disp: 60 capsule, Rfl:     empagliflozin (Jardiance) 25 MG tablet tablet, Take 1 tablet by mouth Daily., Disp: 90 tablet, Rfl: 1    glucose blood (FREESTYLE LITE) test strip, USE TO TEST BLOOD SUGAR DAILY, Disp: 50 each, Rfl: 10    Lancets (freestyle) lancets, USE ONE LANCET TO TEST DAILY TO HELP MONITOR GLUCOSE, Disp: 100 each, Rfl: 3    levocetirizine (XYZAL) 5 MG tablet, Take 1 tablet by mouth Every Evening., Disp: , Rfl:     metFORMIN (GLUCOPHAGE) 1000 MG tablet, TAKE ONE TABLET BY MOUTH TWICE A DAY WITH MEALS, Disp: 180 tablet, Rfl: 1    rosuvastatin (CRESTOR) 20 MG tablet, TAKE ONE TABLET BY MOUTH DAILY, Disp: 90 tablet, Rfl: 1    vitamin B-12 (CYANOCOBALAMIN) 500 MCG tablet, Take 1 tablet by mouth Daily., Disp: , Rfl:      Review of System  Review of Systems   Constitutional:  Negative for unexpected weight loss.   Eyes:  Negative for blurred vision.   Endocrine: Negative for polydipsia and polyuria.   Neurological:  Negative for tremors, speech difficulty and confusion.     I have reviewed and confirmed the accuracy of the ROS as documented by the MA/LPN/RN Rashad Macias MD    Vitals:    02/15/24 1136   BP: 122/78   Pulse: 69   SpO2: 95%     Body mass index is 27.75 kg/m².    Objective     Physical Exam  Physical Exam  Constitutional:        General: He is not in acute distress.     Appearance: Normal appearance.   HENT:      Head: Normocephalic and atraumatic.   Cardiovascular:      Rate and Rhythm: Normal rate and regular rhythm.   Pulmonary:      Effort: Pulmonary effort is normal. No respiratory distress.      Breath sounds: Normal breath sounds. No wheezing, rhonchi or rales.   Neurological:      General: No focal deficit present.      Mental Status: He is alert and oriented to person, place, and time.   Psychiatric:         Mood and Affect: Mood normal.         Behavior: Behavior normal.         Thought Content: Thought content normal.         Judgment: Judgment normal.             Rashad Macias MD  02/15/2024    Answers submitted by the patient for this visit:  Primary Reason for Visit (Submitted on 2/9/2024)  What is the primary reason for your visit?: Diabetes  Diabetes Questionnaire (Submitted on 2/9/2024)  Chief Complaint: Diabetes problem  Below 70: occasionally

## 2024-02-23 ENCOUNTER — OFFICE VISIT (OUTPATIENT)
Dept: ENDOCRINOLOGY | Age: 64
End: 2024-02-23
Payer: COMMERCIAL

## 2024-02-23 VITALS
HEIGHT: 74 IN | BODY MASS INDEX: 27.59 KG/M2 | SYSTOLIC BLOOD PRESSURE: 126 MMHG | OXYGEN SATURATION: 95 % | TEMPERATURE: 97.6 F | HEART RATE: 65 BPM | WEIGHT: 215 LBS | DIASTOLIC BLOOD PRESSURE: 72 MMHG

## 2024-02-23 DIAGNOSIS — E11.9 TYPE 2 DIABETES MELLITUS WITHOUT COMPLICATION, WITHOUT LONG-TERM CURRENT USE OF INSULIN: Primary | ICD-10-CM

## 2024-02-23 DIAGNOSIS — E55.9 VITAMIN D DEFICIENCY: ICD-10-CM

## 2024-02-23 DIAGNOSIS — I10 PRIMARY HYPERTENSION: ICD-10-CM

## 2024-02-23 DIAGNOSIS — G47.33 OSA (OBSTRUCTIVE SLEEP APNEA): ICD-10-CM

## 2024-02-23 DIAGNOSIS — E78.5 HYPERLIPIDEMIA, UNSPECIFIED HYPERLIPIDEMIA TYPE: ICD-10-CM

## 2024-02-23 LAB
25(OH)D3+25(OH)D2 SERPL-MCNC: 39.7 NG/ML (ref 30–100)
ALBUMIN SERPL-MCNC: 4.7 G/DL (ref 3.5–5.2)
ALBUMIN/GLOB SERPL: 2.5 G/DL
ALP SERPL-CCNC: 75 U/L (ref 39–117)
ALT SERPL-CCNC: 32 U/L (ref 1–41)
AST SERPL-CCNC: 28 U/L (ref 1–40)
BILIRUB SERPL-MCNC: 0.5 MG/DL (ref 0–1.2)
BUN SERPL-MCNC: 13 MG/DL (ref 8–23)
BUN/CREAT SERPL: 11.8 (ref 7–25)
CALCIUM SERPL-MCNC: 9.3 MG/DL (ref 8.6–10.5)
CHLORIDE SERPL-SCNC: 105 MMOL/L (ref 98–107)
CHOLEST SERPL-MCNC: 130 MG/DL (ref 0–200)
CO2 SERPL-SCNC: 25.8 MMOL/L (ref 22–29)
CREAT SERPL-MCNC: 1.1 MG/DL (ref 0.76–1.27)
EGFRCR SERPLBLD CKD-EPI 2021: 75.9 ML/MIN/1.73
GLOBULIN SER CALC-MCNC: 1.9 GM/DL
GLUCOSE SERPL-MCNC: 118 MG/DL (ref 65–99)
HDLC SERPL-MCNC: 35 MG/DL (ref 40–60)
IMP & REVIEW OF LAB RESULTS: NORMAL
LDLC SERPL CALC-MCNC: 67 MG/DL (ref 0–100)
POTASSIUM SERPL-SCNC: 4.4 MMOL/L (ref 3.5–5.2)
PROT SERPL-MCNC: 6.6 G/DL (ref 6–8.5)
SODIUM SERPL-SCNC: 143 MMOL/L (ref 136–145)
TRIGL SERPL-MCNC: 164 MG/DL (ref 0–150)
TSH SERPL DL<=0.005 MIU/L-ACNC: 2.68 UIU/ML (ref 0.27–4.2)
VIT B12 SERPL-MCNC: 649 PG/ML (ref 211–946)
VLDLC SERPL CALC-MCNC: 28 MG/DL (ref 5–40)

## 2024-02-23 PROCEDURE — 99214 OFFICE O/P EST MOD 30 MIN: CPT | Performed by: INTERNAL MEDICINE

## 2024-02-23 RX ORDER — LEVOCETIRIZINE DIHYDROCHLORIDE 5 MG/1
TABLET, FILM COATED ORAL
Status: SHIPPED | COMMUNITY
Start: 2024-02-23

## 2024-02-23 NOTE — PROGRESS NOTES
Subjective   Noah Flannery is a 63 y.o. male.     History of Present Illness     He has known diabetes mellitus since 2016.  He is on Metformin 1000 mg BID and Jardiance 25 mg/day.  Insurance will no longer cover Farxiga.  He checks his blood sugar once a day.   -208.  He denies hypoglycemic episodes.  He has no significant weight change since October 2023.  Hemoglobin A1c done at his PCP in February 2024 is at 6.8%.     His last eye examination was in 2023.  He denies eye complaints.  He has no retinopathy.  He denies numbness in his feet.  He has rare tingling in feet.  He denies associated nephropathy.  Urine microalbumin is normal in 6/23.     He has hyperlipidemia and is on Crestor 20 mg/day.  He denies myalgia.     He had right superior and left superior parathyroidectomy in 2017 by Dr. Dumont.  Pathology showed parathyroid adenoma.  He has no recurrence of hypercalcemia since then.  He had passed 2 kidney stones before the parathyroid surgery.     Has hypertension and intermittent palpitations.  He is on bisoprolol 5 mg/day prescribed by Dr. Selby.  He has no history of heart attack or stroke before.  He had normal myocardial perfusion study done in November 2019.     He has vitamin D deficiency and is on vitamin D3 1000 units/day.     He has sleep apnea and is sleeping well with the CPAP.  He follows with Dr. Bustillo.     He had colonoscopy done by Dr. Concepcion on August 2, 2022.  He had tubular adenomas and hyperplastic polyps removed.  He was advised repeat colonoscopy in 2025.    He repairs and makes lamps.    The following portions of the patient's history were reviewed and updated as appropriate: allergies, current medications, past family history, past medical history, past social history, past surgical history, and problem list.    Review of Systems   Eyes:  Negative for visual disturbance.   Respiratory:  Negative for shortness of breath.    Cardiovascular:  Negative for chest pain and  "palpitations.   Genitourinary: Negative.    Musculoskeletal:  Negative for myalgias.   Neurological:  Negative for numbness.     Vitals:    02/23/24 1023   BP: 126/72   Pulse: 65   Temp: 97.6 °F (36.4 °C)   TempSrc: Temporal   SpO2: 95%   Weight: 97.5 kg (215 lb)   Height: 188 cm (74.02\")      Objective   Physical Exam  Constitutional:       General: He is not in acute distress.     Appearance: Normal appearance. He is not ill-appearing, toxic-appearing or diaphoretic.   Eyes:      General: No scleral icterus.        Right eye: No discharge.         Left eye: No discharge.   Cardiovascular:      Rate and Rhythm: Normal rate and regular rhythm.      Heart sounds: Normal heart sounds. No murmur heard.     No friction rub.   Pulmonary:      Breath sounds: Normal breath sounds. No rales.   Chest:      Chest wall: No tenderness.   Abdominal:      General: Bowel sounds are normal.      Palpations: Abdomen is soft.      Tenderness: There is no right CVA tenderness or left CVA tenderness.   Musculoskeletal:      Right lower leg: No edema.      Left lower leg: No edema.      Comments: Feet warm.  Palpable dorsalis pedis pulses.  No plantar ulcers   Skin:     General: Skin is warm.   Neurological:      Mental Status: He is alert and oriented to person, place, and time.      Comments: Intact light touch on lower ext       Office Visit on 02/15/2024   Component Date Value Ref Range Status    Hemoglobin A1C 02/15/2024 6.8 (A)  4.5 - 5.7 % Final    Lot Number 02/15/2024 [id number]   Final    Expiration Date 02/15/2024 [id number]   Final     Assessment & Plan   Diagnoses and all orders for this visit:    1. Type 2 diabetes mellitus without complication, without long-term current use of insulin (Primary)  -     Comprehensive Metabolic Panel  -     Lipid Panel  -     TSH  -     Vitamin B12    2. Hyperlipidemia, unspecified hyperlipidemia type  -     Comprehensive Metabolic Panel  -     Lipid Panel    3. Primary hypertension  -     " Comprehensive Metabolic Panel    4. Vitamin D deficiency  -     Vitamin D,25-Hydroxy    5. JACQUELYN (obstructive sleep apnea)      Continue Jardiance 25 mg/day and metformin 1000 mg twice a day.    Continue Crestor 20 mg/day.    Continue bisoprolol 5 mg/day.    Continue vitamin D3 1000 units/day.    Copy of my note sent to Dr. Macias.    RTC 4 mos.

## 2024-02-28 DIAGNOSIS — E11.9 TYPE 2 DIABETES MELLITUS WITHOUT COMPLICATION, WITHOUT LONG-TERM CURRENT USE OF INSULIN: Primary | ICD-10-CM

## 2024-02-28 DIAGNOSIS — E78.5 HYPERLIPIDEMIA, UNSPECIFIED HYPERLIPIDEMIA TYPE: ICD-10-CM

## 2024-02-28 DIAGNOSIS — E55.9 VITAMIN D DEFICIENCY: ICD-10-CM

## 2024-03-21 ENCOUNTER — OFFICE VISIT (OUTPATIENT)
Dept: CARDIOLOGY | Facility: CLINIC | Age: 64
End: 2024-03-21
Payer: COMMERCIAL

## 2024-03-21 VITALS
DIASTOLIC BLOOD PRESSURE: 80 MMHG | HEIGHT: 74 IN | HEART RATE: 64 BPM | WEIGHT: 214 LBS | SYSTOLIC BLOOD PRESSURE: 122 MMHG | BODY MASS INDEX: 27.46 KG/M2

## 2024-03-21 DIAGNOSIS — R00.2 PALPITATIONS: Primary | ICD-10-CM

## 2024-03-21 DIAGNOSIS — E78.5 HYPERLIPIDEMIA, UNSPECIFIED HYPERLIPIDEMIA TYPE: ICD-10-CM

## 2024-03-21 DIAGNOSIS — I10 PRIMARY HYPERTENSION: ICD-10-CM

## 2024-03-21 PROCEDURE — 93000 ELECTROCARDIOGRAM COMPLETE: CPT | Performed by: INTERNAL MEDICINE

## 2024-03-21 PROCEDURE — 99213 OFFICE O/P EST LOW 20 MIN: CPT | Performed by: INTERNAL MEDICINE

## 2024-03-21 NOTE — PROGRESS NOTES
Subjective:        Noah Flannery is a 63 y.o. male who here for follow up    CC  Occasional palpitation  HPI  63-year-old male with a history of palpitation hypertension hyperlipidemia complains of occasional palpitations     Problems Addressed this Visit          Cardiac and Vasculature    Palpitations - Primary    Primary hypertension    Hyperlipidemia     Diagnoses         Codes Comments    Palpitations    -  Primary ICD-10-CM: R00.2  ICD-9-CM: 785.1     Primary hypertension     ICD-10-CM: I10  ICD-9-CM: 401.9     Hyperlipidemia, unspecified hyperlipidemia type     ICD-10-CM: E78.5  ICD-9-CM: 272.4           .    The following portions of the patient's history were reviewed and updated as appropriate: allergies, current medications, past family history, past medical history, past social history, past surgical history and problem list.    Past Medical History:   Diagnosis Date    Allergic 1980    Arthritis 2000    Broken ankle     Broken ribs     right    Colon polyp     Deep vein thrombosis 2012    Diabetes mellitus     Hyperlipidemia     Hypertension     Sleep apnea     Type 2 diabetes mellitus without complication, without long-term current use of insulin 03/10/2021    Vitamin D deficiency 06/23/2023     reports that he has never smoked. He has never been exposed to tobacco smoke. He has never used smokeless tobacco. He reports current alcohol use. He reports that he does not use drugs.   Family History   Problem Relation Age of Onset    Diabetes Mother     Hypertension Mother     Heart disease Mother     Lung cancer Mother     Obesity Mother     Liver cancer Mother     Depression Mother     Heart disease Father     Lung cancer Father     Arthritis Father     Cancer Father     Obesity Sister     Diabetes Sister     Obesity Brother     Kidney disease Brother     Colon cancer Neg Hx     Colon polyps Neg Hx        Review of Systems  Constitutional: No wt loss, fever, fatigue  Gastrointestinal: No nausea,  "abdominal pain  Behavioral/Psych: No insomnia or anxiety   Cardiovascular occasional palpitation  Objective:       Physical Exam  /80   Pulse 64   Ht 188 cm (74\")   Wt 97.1 kg (214 lb)   BMI 27.48 kg/m²   General appearance: No acute changes   Neck: Trachea midline; NECK, supple, no thyromegaly or lymphadenopathy   Lungs: Normal size and shape, normal breath sounds, equal distribution of air, no rales and rhonchi   CV: S1-S2 regular, no murmurs, no rub, no gallop   Abdomen: Soft, nontender; no masses , no abnormal abdominal sounds   Extremities: No deformity , normal color , no peripheral edema   Skin: Normal temperature, turgor and texture; no rash, ulcers            ECG 12 Lead    Date/Time: 3/21/2024 12:11 PM  Performed by: Norman Selby MD    Authorized by: Norman Selby MD  Comparison: compared with previous ECG   Similar to previous ECG  Rhythm: sinus rhythm    Clinical impression: non-specific ECG            Echocardiogram:    Results for orders placed in visit on 07/28/22    Adult Transthoracic Echo Complete W/ Cont if Necessary Per Protocol    Interpretation Summary  · Left ventricular wall thickness is consistent with mild concentric hypertrophy.  · Left ventricular ejection fraction appears to be 56 - 60%.  · Left ventricular diastolic function was indeterminate.  · Estimated right ventricular systolic pressure from tricuspid regurgitation is normal (<35 mmHg).    There were no apparent intracardiac masses, vegetations or thrombi.          Current Outpatient Medications:     bisoprolol (ZEBeta) 5 MG tablet, TAKE ONE TABLET BY MOUTH DAILY, Disp: 30 tablet, Rfl: 6    Cetirizine HCl (ZYRTEC PO), Take 10 mg by mouth Daily. One time daily unless he feels very bad then he takes 2, Disp: , Rfl:     Cholecalciferol (Vitamin D-3) 25 MCG (1000 UT) capsule, Take 1,000 Units by mouth Daily., Disp: 60 capsule, Rfl:     empagliflozin (Jardiance) 25 MG tablet tablet, Take 1 tablet by mouth " Daily., Disp: 90 tablet, Rfl: 1    levocetirizine (XYZAL) 5 MG tablet, Use daily prn, Disp: , Rfl:     metFORMIN (GLUCOPHAGE) 1000 MG tablet, TAKE ONE TABLET BY MOUTH TWICE A DAY WITH MEALS, Disp: 180 tablet, Rfl: 1    rosuvastatin (CRESTOR) 20 MG tablet, TAKE ONE TABLET BY MOUTH DAILY, Disp: 90 tablet, Rfl: 1    vitamin B-12 (CYANOCOBALAMIN) 500 MCG tablet, Take 1 tablet by mouth Daily., Disp: , Rfl:     glucose blood (FREESTYLE LITE) test strip, USE TO TEST BLOOD SUGAR DAILY, Disp: 50 each, Rfl: 10    Lancets (freestyle) lancets, USE ONE LANCET TO TEST DAILY TO HELP MONITOR GLUCOSE, Disp: 100 each, Rfl: 3   Assessment:                Plan:          ICD-10-CM ICD-9-CM   1. Palpitations  R00.2 785.1   2. Primary hypertension  I10 401.9   3. Hyperlipidemia, unspecified hyperlipidemia type  E78.5 272.4     1. Palpitations  Palpitations        2. Primary hypertension  Patient understands importance of blood pressure check at home which patient does regularly and the blood pressures are well under control to the level of less than 140/90      3. Hyperlipidemia, unspecified hyperlipidemia type  Continue current treatment      1 yr  COUNSELING:    Noah Petersen was given to patient for the following topics: diagnostic results, risk factor reductions, impressions, risks and benefits of treatment options and importance of treatment compliance .       SMOKING COUNSELING:        Dictated using Dragon dictation

## 2024-04-04 DIAGNOSIS — E78.00 PURE HYPERCHOLESTEROLEMIA: ICD-10-CM

## 2024-04-04 RX ORDER — ROSUVASTATIN CALCIUM 20 MG/1
20 TABLET, COATED ORAL DAILY
Qty: 90 TABLET | Refills: 1 | Status: SHIPPED | OUTPATIENT
Start: 2024-04-04

## 2024-04-12 DIAGNOSIS — E11.9 TYPE 2 DIABETES MELLITUS WITHOUT COMPLICATION, WITHOUT LONG-TERM CURRENT USE OF INSULIN: ICD-10-CM

## 2024-04-12 RX ORDER — EMPAGLIFLOZIN 25 MG/1
25 TABLET, FILM COATED ORAL DAILY
Qty: 90 TABLET | Refills: 1 | Status: SHIPPED | OUTPATIENT
Start: 2024-04-12

## 2024-04-12 NOTE — TELEPHONE ENCOUNTER
Rx Refill Note  Requested Prescriptions     Pending Prescriptions Disp Refills    Jardiance 25 MG tablet tablet [Pharmacy Med Name: JARDIANCE 25 MG TABLET] 90 tablet 1     Sig: TAKE ONE TABLET BY MOUTH DAILY      Last office visit with prescribing clinician: 2/23/2024   Last telemedicine visit with prescribing clinician: Visit date not found   Next office visit with prescribing clinician: Visit date not found                         Would you like a call back once the refill request has been completed: [] Yes [] No    If the office needs to give you a call back, can they leave a voicemail: [] Yes [] No    Corin Espinoza MA  04/12/24, 08:00 EDT

## 2024-04-15 RX ORDER — LANCETS 28 GAUGE
EACH MISCELLANEOUS
Qty: 100 EACH | Refills: 3 | Status: SHIPPED | OUTPATIENT
Start: 2024-04-15

## 2024-06-25 ENCOUNTER — OFFICE VISIT (OUTPATIENT)
Dept: ENDOCRINOLOGY | Age: 64
End: 2024-06-25
Payer: COMMERCIAL

## 2024-06-25 VITALS
DIASTOLIC BLOOD PRESSURE: 84 MMHG | HEIGHT: 74 IN | TEMPERATURE: 96.9 F | HEART RATE: 63 BPM | WEIGHT: 219.6 LBS | SYSTOLIC BLOOD PRESSURE: 122 MMHG | OXYGEN SATURATION: 90 % | BODY MASS INDEX: 28.18 KG/M2

## 2024-06-25 DIAGNOSIS — E78.5 HYPERLIPIDEMIA, UNSPECIFIED HYPERLIPIDEMIA TYPE: ICD-10-CM

## 2024-06-25 DIAGNOSIS — E11.65 TYPE 2 DIABETES MELLITUS WITH HYPERGLYCEMIA, WITHOUT LONG-TERM CURRENT USE OF INSULIN: Primary | ICD-10-CM

## 2024-06-25 DIAGNOSIS — I10 PRIMARY HYPERTENSION: ICD-10-CM

## 2024-06-25 PROCEDURE — 99214 OFFICE O/P EST MOD 30 MIN: CPT | Performed by: NURSE PRACTITIONER

## 2024-06-25 RX ORDER — LORATADINE 10 MG/1
10 TABLET ORAL DAILY
COMMUNITY

## 2024-06-25 NOTE — PROGRESS NOTES
Chief Complaint  Chief Complaint   Patient presents with    Diabetes     Type 2 : Pt does have meter today, unable to download checks bs once a day, readings scanned into the chart, is up to date on eye exam, no hx of retinopathy or neuropathy.         Subjective          History of Present Illness    Noah Flannery 64 y.o. presents for a follow-up for Type 2 Diabetes    He was diagnosed with diabetes in 2016    Pt is on the following medications for their diabetes: Metformin 1,000 mg twice daily and Jardiance 25 mg daily.          Farxiga stopped due to insurance      Pt complains of intermittent numbness and tingling in feet - mostly morning and evenings    Denies diarrhea, constipation, chest pain, shortness of breath and vision changes    Weight gain of 4 lbs since last visit.    Pt does not have diabetic retinopathy.  Last eye exam was 04/09/24    Pt does not have nephropathy.  Patient is not currently taking ACE/ARB    Pt denies neuropathy.    Pt denies a history of CAD or CVA.    Last A1C in 02/24 was 6.8    Last microalbumin in 06/23 was negative           Blood Sugars    Blood glucoses are checked 1/day.    Post-lunch blood glucoses: 120-205    Pt denies episodes of hypoglycemia.            Hyperlipidemia     Pt is currently taking Crestor 20 mg HS     Last lipid panel in 02/24 showed Total 130, HDL 35, LDL 67 and Triglycerides 164            Hypertension    Pt denies any chest pain, palpitations, shortness of breath or headache    Current regimen includes bisoprolol 5 mg daily      Follows with Dr. Selby                 History of Hyperparathyroidism     He had right superior and left superior parathyroidectomy in 2017 by Dr. Dumont.  Pathology showed parathyroid adenoma.  He has no recurrence of hypercalcemia since then.  He had passed 2 kidney stones before the parathyroid surgery.      Last Calcium was 9.3 in 02/24            I have reviewed the patient's allergies, medicines, past medical hx,  "family hx and social hx.    Objective   Vital Signs:   /84   Pulse 63   Temp 96.9 °F (36.1 °C) (Temporal)   Ht 188 cm (74.02\")   Wt 99.6 kg (219 lb 9.6 oz)   SpO2 90%   BMI 28.18 kg/m²       Physical Exam   Physical Exam  Constitutional:       General: He is not in acute distress.     Appearance: Normal appearance. He is not diaphoretic.   HENT:      Head: Normocephalic and atraumatic.   Eyes:      General:         Right eye: No discharge.         Left eye: No discharge.   Skin:     General: Skin is warm and dry.   Neurological:      Mental Status: He is alert.   Psychiatric:         Mood and Affect: Mood normal.         Behavior: Behavior normal.                    Results Review:   Hemoglobin A1C   Date Value Ref Range Status   02/15/2024 6.8 (A) 4.5 - 5.7 % Final     Triglycerides   Date Value Ref Range Status   02/23/2024 164 (H) 0 - 150 mg/dL Final     HDL Cholesterol   Date Value Ref Range Status   02/23/2024 35 (L) 40 - 60 mg/dL Final     LDL Chol Calc (NIH)   Date Value Ref Range Status   02/23/2024 67 0 - 100 mg/dL Final     VLDL Cholesterol Bryce   Date Value Ref Range Status   02/23/2024 28 5 - 40 mg/dL Final         Assessment and Plan {CC Problem List  Visit Diagnosis  ROS  Review (Popup)  Health Maintenance  Quality  BestPractice  Medications  SmartSets  SnapShot Encounters  Media :23  Diagnoses and all orders for this visit:    1. Type 2 diabetes mellitus with hyperglycemia, without long-term current use of insulin (Primary)  -     Hemoglobin A1c  -     Comprehensive Metabolic Panel  -     Microalbumin / Creatinine Urine Ratio - Urine, Clean Catch    Check labs today  Continue with Metformin 1,000 mg twice daily   Continue with Jardiance 25 mg daily.    Pt admits to dietary non-compliance  Continue with BG checks      2. Hyperlipidemia, unspecified hyperlipidemia type  -     Comprehensive Metabolic Panel  -     Lipid Panel    Check labs today  Continue with statin      3. Primary " hypertension  -     Comprehensive Metabolic Panel    Stable  Continue with current medication regimen  Defer management to PCP/cardiology          Labs today  RTC in 4 months with me and 8 months with Dr. Duffy      Follow Up     Patient was given instructions and counseling regarding her condition or for health maintenance advice. Please see specific information pulled into the AVS if appropriate.                EMMIE Gaytan  06/25/24

## 2024-06-26 LAB
ALBUMIN SERPL-MCNC: 4.5 G/DL (ref 3.5–5.2)
ALBUMIN/CREAT UR: 26 MG/G CREAT (ref 0–29)
ALBUMIN/GLOB SERPL: 2.6 G/DL
ALP SERPL-CCNC: 87 U/L (ref 39–117)
ALT SERPL-CCNC: 17 U/L (ref 1–41)
AST SERPL-CCNC: 17 U/L (ref 1–40)
BILIRUB SERPL-MCNC: 0.4 MG/DL (ref 0–1.2)
BUN SERPL-MCNC: 15 MG/DL (ref 8–23)
BUN/CREAT SERPL: 13.9 (ref 7–25)
CALCIUM SERPL-MCNC: 9.4 MG/DL (ref 8.6–10.5)
CHLORIDE SERPL-SCNC: 106 MMOL/L (ref 98–107)
CHOLEST SERPL-MCNC: 119 MG/DL (ref 0–200)
CO2 SERPL-SCNC: 25.7 MMOL/L (ref 22–29)
CREAT SERPL-MCNC: 1.08 MG/DL (ref 0.76–1.27)
CREAT UR-MCNC: 69.5 MG/DL
EGFRCR SERPLBLD CKD-EPI 2021: 77.1 ML/MIN/1.73
GLOBULIN SER CALC-MCNC: 1.7 GM/DL
GLUCOSE SERPL-MCNC: 144 MG/DL (ref 65–99)
HBA1C MFR BLD: 7.6 % (ref 4.8–5.6)
HDLC SERPL-MCNC: 32 MG/DL (ref 40–60)
IMP & REVIEW OF LAB RESULTS: NORMAL
LDLC SERPL CALC-MCNC: 68 MG/DL (ref 0–100)
MICROALBUMIN UR-MCNC: 17.9 UG/ML
POTASSIUM SERPL-SCNC: 4.5 MMOL/L (ref 3.5–5.2)
PROT SERPL-MCNC: 6.2 G/DL (ref 6–8.5)
SODIUM SERPL-SCNC: 142 MMOL/L (ref 136–145)
TRIGL SERPL-MCNC: 97 MG/DL (ref 0–150)
VLDLC SERPL CALC-MCNC: 19 MG/DL (ref 5–40)

## 2024-07-03 RX ORDER — BISOPROLOL FUMARATE 5 MG/1
5 TABLET, FILM COATED ORAL DAILY
Qty: 30 TABLET | Refills: 11 | Status: SHIPPED | OUTPATIENT
Start: 2024-07-03

## 2024-07-08 ENCOUNTER — PRIOR AUTHORIZATION (OUTPATIENT)
Dept: ENDOCRINOLOGY | Age: 64
End: 2024-07-08
Payer: COMMERCIAL

## 2024-07-09 ENCOUNTER — PRIOR AUTHORIZATION (OUTPATIENT)
Dept: ENDOCRINOLOGY | Age: 64
End: 2024-07-09
Payer: COMMERCIAL

## 2024-07-09 NOTE — TELEPHONE ENCOUNTER
A PA for Jardiance 25MG tablets has been started.    (Key: [id number])  PA Case ID #: [id number]  Rx #: [id number]

## 2024-07-10 NOTE — TELEPHONE ENCOUNTER
Approved on July 9    Your PA request has been approved. Additional information will be provided in the approval communication. (Message 0787)    Authorization Expiration Date: 7/8/2025

## 2024-07-15 DIAGNOSIS — Z79.4 TYPE 2 DIABETES MELLITUS WITH HYPERGLYCEMIA, WITH LONG-TERM CURRENT USE OF INSULIN: ICD-10-CM

## 2024-07-15 DIAGNOSIS — E11.65 TYPE 2 DIABETES MELLITUS WITH HYPERGLYCEMIA, WITH LONG-TERM CURRENT USE OF INSULIN: ICD-10-CM

## 2024-07-15 NOTE — TELEPHONE ENCOUNTER
Rx Refill Note  Requested Prescriptions     Pending Prescriptions Disp Refills    metFORMIN (GLUCOPHAGE) 1000 MG tablet 180 tablet 1     Sig: Take 1 tablet by mouth 2 (Two) Times a Day With Meals.      Last office visit with prescribing clinician: 6/25/2024   Last telemedicine visit with prescribing clinician: Visit date not found   Next office visit with prescribing clinician: 10/25/2024                         Would you like a call back once the refill request has been completed: [] Yes [] No    If the office needs to give you a call back, can they leave a voicemail: [] Yes [] No    Susana Espinoza  07/15/24, 13:15 EDT

## 2024-07-16 ENCOUNTER — TELEPHONE (OUTPATIENT)
Dept: ENDOCRINOLOGY | Age: 64
End: 2024-07-16
Payer: COMMERCIAL

## 2024-07-16 NOTE — TELEPHONE ENCOUNTER
Patient got mail from [employer] regarding Jardiance being approved. But he uses [facility] in [place], I informed him sometimes insurance goes through [employer] to get the medication approved.

## 2024-08-15 ENCOUNTER — OFFICE VISIT (OUTPATIENT)
Dept: FAMILY MEDICINE CLINIC | Facility: CLINIC | Age: 64
End: 2024-08-15
Payer: COMMERCIAL

## 2024-08-15 VITALS
HEIGHT: 74 IN | DIASTOLIC BLOOD PRESSURE: 80 MMHG | SYSTOLIC BLOOD PRESSURE: 110 MMHG | WEIGHT: 217 LBS | BODY MASS INDEX: 27.85 KG/M2

## 2024-08-15 DIAGNOSIS — Z00.00 ENCOUNTER FOR PHYSICAL EXAMINATION: ICD-10-CM

## 2024-08-15 DIAGNOSIS — E11.9 TYPE 2 DIABETES MELLITUS WITHOUT COMPLICATION, WITHOUT LONG-TERM CURRENT USE OF INSULIN: Primary | ICD-10-CM

## 2024-08-15 DIAGNOSIS — E78.5 HYPERLIPIDEMIA, UNSPECIFIED HYPERLIPIDEMIA TYPE: ICD-10-CM

## 2024-08-15 DIAGNOSIS — I10 PRIMARY HYPERTENSION: ICD-10-CM

## 2024-08-15 PROCEDURE — 99396 PREV VISIT EST AGE 40-64: CPT | Performed by: INTERNAL MEDICINE

## 2024-08-29 NOTE — PROGRESS NOTES
08/15/2024    Assessment & Plan   This pleasant 64-year-old presents at this time for physical examination.  He relates he is feeling well having had no problems in the interim of visits.    Regarding anticipatory guidance we gave him a low-cholesterol diet sheet for implementation at our direction.    Review of his labs from 6/25/2024 shows that his lipid profile showed an LDL within normal limits at 68 with a triglyceride of 97.  His comprehensive metabolic profile showed a glucose level elevation at 144 but otherwise unremarkable.  His GFR was 77.1 it is noted.  His last A1c was noted to be 7.6 note is made that he is being followed by endocrinology for diabetes mellitus type 2.    Diagnoses and all orders for this visit:    1. Type 2 diabetes mellitus without complication, without long-term current use of insulin (Primary)    2. Primary hypertension    3. Hyperlipidemia, unspecified hyperlipidemia type    4. Encounter for physical examination         Plan:  1.)  Follow-up in 5 to 6 months         CC: Annual Exam  .        HPI  History of Present Illness     Subjective   Noah Flannery is a 64 y.o. male.      The following portions of the patient's history were reviewed and updated as appropriate: allergies, current medications, past family history, past medical history, past social history, past surgical history, and problem list.    Problem List  Patient Active Problem List   Diagnosis    Personal history of colonic polyps    Palpitations    Primary hypertension    Hypercalcemia    Hyperlipidemia    History of parathyroidectomy    JACQUELYN (obstructive sleep apnea)    Type 2 diabetes mellitus with hyperglycemia, without long-term current use of insulin    Pes planus    Tendonitis, Achilles, right    Arthritis of ankle    Vitamin D deficiency       Past Medical History  Past Medical History:   Diagnosis Date    Allergic 1980    Arthritis 2000    Broken ankle     Broken ribs     right    Colon polyp     Deep vein  thrombosis 2012    Diabetes mellitus     Hyperlipidemia     Hypertension     Sleep apnea     Type 2 diabetes mellitus without complication, without long-term current use of insulin 03/10/2021    Vitamin D deficiency 06/23/2023       Surgical History  Past Surgical History:   Procedure Laterality Date    ABLATION OF DYSRHYTHMIC FOCUS  2015    ANKLE SURGERY      APPENDECTOMY  aoril 23 79    COLONOSCOPY      COLONOSCOPY N/A 07/30/2019    Procedure: COLONOSCOPY to cecum into TI with cold biopsy and cold snare polypectomies;  Surgeon: Thomas Concepcion MD;  Location: [facility] ENDOSCOPY;  Service: Gastroenterology    COLONOSCOPY N/A 08/02/2022    Procedure: COLONOSCOPY with Polypectomy;  Surgeon: Thomas Concepcion MD;  Location: [facility] MAIN OR;  Service: Gastroenterology;  Laterality: N/A;  Transverse polyp x2, ascending colon polyp    FRACTURE SURGERY  03/10/1990    Rt ankle    HERNIA REPAIR      INGUINAL HERNIA REPAIR  1998    JOINT REPLACEMENT  2020    Both shoulders    PARATHYROID GLAND SURGERY  2017    Right superior and left superior parathyroidectomy for adenoma.  Dr. Dumont.  [place]    TOTAL SHOULDER REPLACEMENT Left     TOTAL SHOULDER REPLACEMENT Right 11/18/2020    UMBILICAL HERNIA REPAIR  2017       Family History  Family History   Problem Relation Age of Onset    Diabetes Mother     Hypertension Mother     Heart disease Mother     Lung cancer Mother     Obesity Mother     Liver cancer Mother     Depression Mother     Heart disease Father     Lung cancer Father     Arthritis Father     Cancer Father     Obesity Sister     Diabetes Sister     Obesity Brother     Kidney disease Brother     Colon cancer Neg Hx     Colon polyps Neg Hx        Social History  Social History    Tobacco Use      Smoking status: Never        Passive exposure: Never      Smokeless tobacco: Never       Is the Patient a current tobacco user? No    Allergies  No Known Allergies    Current Medications    Current Outpatient  Medications:     bisoprolol (ZEBeta) 5 MG tablet, TAKE 1 TABLET BY MOUTH DAILY, Disp: 30 tablet, Rfl: 11    Cholecalciferol (Vitamin D-3) 25 MCG (1000 UT) capsule, Take 1,000 Units by mouth Daily., Disp: 60 capsule, Rfl:     glucose blood (FREESTYLE LITE) test strip, USE TO TEST BLOOD SUGAR DAILY, Disp: 50 each, Rfl: 10    Jardiance 25 MG tablet tablet, TAKE ONE TABLET BY MOUTH DAILY, Disp: 90 tablet, Rfl: 1    Lancets (freestyle) lancets, USE ONE LANCET TO TEST DAILY TO HELP MONITOR GLUCOSE, Disp: 100 each, Rfl: 3    levocetirizine (XYZAL) 5 MG tablet, Use daily prn, Disp: , Rfl:     metFORMIN (GLUCOPHAGE) 1000 MG tablet, Take 1 tablet by mouth 2 (Two) Times a Day With Meals., Disp: 180 tablet, Rfl: 1    rosuvastatin (CRESTOR) 20 MG tablet, TAKE 1 TABLET BY MOUTH DAILY, Disp: 90 tablet, Rfl: 1    vitamin B-12 (CYANOCOBALAMIN) 500 MCG tablet, Take 1 tablet by mouth Daily., Disp: , Rfl:     Cetirizine HCl (ZYRTEC PO), Take 10 mg by mouth Daily. One time daily unless he feels very bad then he takes 2 (Patient not taking: Reported on 8/15/2024), Disp: , Rfl:     loratadine (CLARITIN) 10 MG tablet, Take 1 tablet by mouth Daily. (Patient not taking: Reported on 8/15/2024), Disp: , Rfl:      Review of System  Review of Systems   Constitutional: Negative.    HENT: Negative.     Eyes: Negative.    Respiratory: Negative.     Cardiovascular: Negative.    Gastrointestinal: Negative.    Endocrine: Negative.    Genitourinary: Negative.    Musculoskeletal: Negative.    Skin: Negative.    Allergic/Immunologic: Negative.    Neurological: Negative.    Hematological: Negative.    Psychiatric/Behavioral: Negative.       I have reviewed and confirmed the accuracy of the ROS as documented by the MA/LPN/RN Rashad Macias MD    Vitals:    08/15/24 1041   BP: 110/80     Body mass index is 27.86 kg/m².    Objective     Physical Exam  Physical Exam  Vitals and nursing note reviewed.   Constitutional:       Appearance: He is  well-developed.   HENT:      Head: Normocephalic and atraumatic.   Eyes:      Conjunctiva/sclera: Conjunctivae normal.   Cardiovascular:      Rate and Rhythm: Normal rate and regular rhythm.      Heart sounds: Normal heart sounds.   Pulmonary:      Effort: Pulmonary effort is normal.      Breath sounds: Normal breath sounds.   Abdominal:      General: Bowel sounds are normal.      Palpations: Abdomen is soft.   Musculoskeletal:         General: Normal range of motion.      Cervical back: Normal range of motion and neck supple.   Skin:     General: Skin is warm and dry.   Neurological:      Mental Status: He is alert and oriented to person, place, and time.   Psychiatric:         Behavior: Behavior normal.           Rashad Macias MD  08/15/2024

## 2024-10-03 DIAGNOSIS — E78.00 PURE HYPERCHOLESTEROLEMIA: ICD-10-CM

## 2024-10-03 RX ORDER — ROSUVASTATIN CALCIUM 20 MG/1
20 TABLET, COATED ORAL DAILY
Qty: 90 TABLET | Refills: 1 | Status: SHIPPED | OUTPATIENT
Start: 2024-10-03

## 2024-10-11 DIAGNOSIS — E11.9 TYPE 2 DIABETES MELLITUS WITHOUT COMPLICATION, WITHOUT LONG-TERM CURRENT USE OF INSULIN: ICD-10-CM

## 2024-10-11 RX ORDER — EMPAGLIFLOZIN 25 MG/1
25 TABLET, FILM COATED ORAL DAILY
Qty: 90 TABLET | Refills: 1 | Status: SHIPPED | OUTPATIENT
Start: 2024-10-11

## 2024-10-11 RX ORDER — BLOOD-GLUCOSE METER
KIT MISCELLANEOUS
Qty: 90 EACH | Refills: 2 | Status: SHIPPED | OUTPATIENT
Start: 2024-10-11 | End: 2025-01-09

## 2024-10-11 NOTE — TELEPHONE ENCOUNTER
Rx Refill Note  Requested Prescriptions     Pending Prescriptions Disp Refills    Jardiance 25 MG tablet tablet [Pharmacy Med Name: JARDIANCE 25 MG TABLET] 90 tablet 1     Sig: TAKE 1 TABLET BY MOUTH DAILY      Last office visit with prescribing clinician: 2/23/2024   Last telemedicine visit with prescribing clinician: Visit date not found   Next office visit with prescribing clinician: 2/25/2025                         Would you like a call back once the refill request has been completed: [] Yes [] No    If the office needs to give you a call back, can they leave a voicemail: [] Yes [] No    Susana Espinoza  10/11/24, 07:48 EDT

## 2024-10-25 ENCOUNTER — OFFICE VISIT (OUTPATIENT)
Dept: ENDOCRINOLOGY | Age: 64
End: 2024-10-25
Payer: COMMERCIAL

## 2024-10-25 VITALS
HEIGHT: 74 IN | SYSTOLIC BLOOD PRESSURE: 108 MMHG | TEMPERATURE: 98.4 F | HEART RATE: 58 BPM | OXYGEN SATURATION: 96 % | WEIGHT: 213.8 LBS | BODY MASS INDEX: 27.44 KG/M2 | DIASTOLIC BLOOD PRESSURE: 66 MMHG

## 2024-10-25 DIAGNOSIS — I10 PRIMARY HYPERTENSION: ICD-10-CM

## 2024-10-25 DIAGNOSIS — E78.5 HYPERLIPIDEMIA, UNSPECIFIED HYPERLIPIDEMIA TYPE: ICD-10-CM

## 2024-10-25 DIAGNOSIS — E11.65 TYPE 2 DIABETES MELLITUS WITH HYPERGLYCEMIA, WITHOUT LONG-TERM CURRENT USE OF INSULIN: Primary | ICD-10-CM

## 2024-10-25 LAB
BUN SERPL-MCNC: 14 MG/DL (ref 8–23)
BUN/CREAT SERPL: 12.5 (ref 7–25)
CALCIUM SERPL-MCNC: 9.5 MG/DL (ref 8.6–10.5)
CHLORIDE SERPL-SCNC: 108 MMOL/L (ref 98–107)
CO2 SERPL-SCNC: 27.4 MMOL/L (ref 22–29)
CREAT SERPL-MCNC: 1.12 MG/DL (ref 0.76–1.27)
EGFRCR SERPLBLD CKD-EPI 2021: 73.8 ML/MIN/1.73
GLUCOSE SERPL-MCNC: 119 MG/DL (ref 65–99)
HBA1C MFR BLD: 7.2 % (ref 4.8–5.6)
POTASSIUM SERPL-SCNC: 4.1 MMOL/L (ref 3.5–5.2)
SODIUM SERPL-SCNC: 143 MMOL/L (ref 136–145)

## 2024-10-25 RX ORDER — SEMAGLUTIDE 0.68 MG/ML
.25-.5 INJECTION, SOLUTION SUBCUTANEOUS WEEKLY
Qty: 9 ML | Refills: 1 | Status: SHIPPED | OUTPATIENT
Start: 2024-10-25

## 2024-10-25 NOTE — PROGRESS NOTES
"Chief Complaint  Chief Complaint   Patient presents with    Diabetes     Testing bs 1 x day last dm eye exam 4/9/24       Subjective          History of Present Illness    Noah Flannery 64 y.o. presents for a follow-up for Type 2 Diabetes     He was diagnosed with diabetes in 2016     Pt is on the following medications for their diabetes: Metformin 1,000 mg twice daily and Jardiance 25 mg daily.            Farxiga stopped due to insurance        Pt complains of intermittent numbness and tingling in feet    Denies chest pain, shortness of breath and vision changes    Pt does not have diabetic retinopathy.  Last eye exam was 04/09/24     Pt does not have nephropathy.  Patient is not currently taking ACE/ARB     Pt denies neuropathy.     Pt denies a history of CAD or CVA.    Last A1C in 06/24 was 7.6    Last microalbumin in 06/24 was negative          Blood Sugars    Blood glucoses are checked 1/day.    Pre-meal blood glucoses: 122-222    Pt denies episodes of hypoglycemia.              Hyperlipidemia     Pt is currently taking rosuvastatin 20 mg HS    Last lipid panel in 06/24 showed Total 119, HDL 32, LDL 68 and Triglycerides 97            Hypertension    Pt denies any chest pain, palpitations, shortness of breath or headache    Current regimen includes bisoprolol 5 mg daily      Follows with Cardiology (Dr. Selby)                     History of Hyperparathyroidism     He had right superior and left superior parathyroidectomy in 2017 by Dr. Dumont.  Pathology showed parathyroid adenoma.  He has no recurrence of hypercalcemia since then.  He had passed 2 kidney stones before the parathyroid surgery.      Last Calcium was 9.4 in 06/24            I have reviewed the patient's allergies, medicines, past medical hx, family hx and social hx.    Objective   Vital Signs:   /66   Pulse 58   Temp 98.4 °F (36.9 °C) (Oral)   Ht 188 cm (74.02\")   Wt 97 kg (213 lb 12.8 oz)   SpO2 96%   BMI 27.44 kg/m² "       Physical Exam   Physical Exam  Constitutional:       General: He is not in acute distress.     Appearance: Normal appearance. He is not diaphoretic.   HENT:      Head: Normocephalic and atraumatic.   Eyes:      General:         Right eye: No discharge.         Left eye: No discharge.   Skin:     General: Skin is warm and dry.   Neurological:      Mental Status: He is alert.   Psychiatric:         Mood and Affect: Mood normal.         Behavior: Behavior normal.                    Results Review:   Hemoglobin A1C   Date Value Ref Range Status   06/25/2024 7.60 (H) 4.80 - 5.60 % Final     Comment:     Hemoglobin A1C Ranges:  Increased Risk for Diabetes  5.7% to 6.4%  Diabetes                     >= 6.5%  Diabetic Goal                < 7.0%     02/15/2024 6.8 (A) 4.5 - 5.7 % Final     Triglycerides   Date Value Ref Range Status   06/25/2024 97 0 - 150 mg/dL Final     HDL Cholesterol   Date Value Ref Range Status   06/25/2024 32 (L) 40 - 60 mg/dL Final     LDL Chol Calc (NIH)   Date Value Ref Range Status   06/25/2024 68 0 - 100 mg/dL Final     VLDL Cholesterol Bryce   Date Value Ref Range Status   06/25/2024 19 5 - 40 mg/dL Final         Assessment and Plan {CC Problem List  Visit Diagnosis  ROS  Review (Popup)  Health Maintenance  Quality  BestPractice  Medications  SmartSets  SnapShot Encounters  Media :23  Diagnoses and all orders for this visit:    1. Type 2 diabetes mellitus with hyperglycemia, without long-term current use of insulin (Primary)  -     Semaglutide,0.25 or 0.5MG/DOS, (Ozempic, 0.25 or 0.5 MG/DOSE,) 2 MG/3ML solution pen-injector; Inject 0.25-0.5 mg under the skin into the appropriate area as directed 1 (One) Time Per Week. Inject 0.25 mg weekly for four weeks, then increase to 0.5 mg weekly and stay on 0.5 mg weekly.  Dispense: 9 mL; Refill: 1  -     Hemoglobin A1c  -     Basic Metabolic Panel    BG are running high  Continue with Metformin 1,000 mg twice daily   Continue with  Jardiance 25 mg daily  Continue with blood sugar checks  Start Ozempic 0.25 mg once a week for 4 weeks then increase to 0.5 mg weekly - Patient denies personal history of pancreatitis, does not have history of retinopathy and patient denies personal or family history of thyroid cancer.  GLP1 side effects were discussed.      2. Hyperlipidemia, unspecified hyperlipidemia type  -     Basic Metabolic Panel    Continue with statin      3. Primary hypertension  -     Basic Metabolic Panel    Stable  Continue with current medication regimen  Defer management to PCP            Labs today  RTC on 02/25/25 with Dr. Duffy        Follow Up     Patient was given instructions and counseling regarding her condition or for health maintenance advice. Please see specific information pulled into the AVS if appropriate.                Joyce Rosas, EMMIE  10/25/24

## 2024-10-25 NOTE — PATIENT INSTRUCTIONS
Continue with Metformin 1,000 mg twice daily   Continue with Jardiance 25 mg daily  Continue with blood sugar checks  Start Ozempic 0.25 mg once a week for 4 weeks then increase to 0.5 mg weekly

## 2025-01-10 DIAGNOSIS — Z79.4 TYPE 2 DIABETES MELLITUS WITH HYPERGLYCEMIA, WITH LONG-TERM CURRENT USE OF INSULIN: ICD-10-CM

## 2025-01-10 DIAGNOSIS — E11.65 TYPE 2 DIABETES MELLITUS WITH HYPERGLYCEMIA, WITH LONG-TERM CURRENT USE OF INSULIN: ICD-10-CM

## 2025-01-10 NOTE — TELEPHONE ENCOUNTER
Rx Refill Note  Requested Prescriptions     Pending Prescriptions Disp Refills    metFORMIN (GLUCOPHAGE) 1000 MG tablet [Pharmacy Med Name: METFORMIN HCL 1,000 MG TABLET] 180 tablet 1     Sig: TAKE 1 TABLET BY MOUTH TWICE A DAY WITH A MEAL      Last office visit with prescribing clinician: 10/25/2024   Last telemedicine visit with prescribing clinician: Visit date not found   Next office visit with prescribing clinician: Visit date not found                         Would you like a call back once the refill request has been completed: [] Yes [] No    If the office needs to give you a call back, can they leave a voicemail: [] Yes [] No    Corin Espinoza MA  01/10/25, 07:27 EST

## 2025-01-11 DIAGNOSIS — Z79.4 TYPE 2 DIABETES MELLITUS WITH HYPERGLYCEMIA, WITH LONG-TERM CURRENT USE OF INSULIN: ICD-10-CM

## 2025-01-11 DIAGNOSIS — E11.65 TYPE 2 DIABETES MELLITUS WITH HYPERGLYCEMIA, WITH LONG-TERM CURRENT USE OF INSULIN: ICD-10-CM

## 2025-01-13 NOTE — TELEPHONE ENCOUNTER
Rx Refill Note  Requested Prescriptions     Pending Prescriptions Disp Refills    metFORMIN (GLUCOPHAGE) 1000 MG tablet [Pharmacy Med Name: METFORMIN HCL 1,000 MG TABLET] 180 tablet 1     Sig: TAKE 1 TABLET BY MOUTH TWICE A DAY WITH A MEAL      Last office visit with prescribing clinician: 10/25/2024   Last telemedicine visit with prescribing clinician: Visit date not found   Next office visit with prescribing clinician: Visit date not found                         Would you like a call back once the refill request has been completed: [] Yes [] No    If the office needs to give you a call back, can they leave a voicemail: [] Yes [] No    Susana Espinoza  01/13/25, 07:39 EST

## 2025-01-22 ENCOUNTER — OFFICE VISIT (OUTPATIENT)
Dept: FAMILY MEDICINE CLINIC | Facility: CLINIC | Age: 65
End: 2025-01-22
Payer: COMMERCIAL

## 2025-01-22 VITALS
SYSTOLIC BLOOD PRESSURE: 110 MMHG | HEIGHT: 74 IN | BODY MASS INDEX: 26.56 KG/M2 | WEIGHT: 207 LBS | DIASTOLIC BLOOD PRESSURE: 78 MMHG

## 2025-01-22 DIAGNOSIS — E11.9 TYPE 2 DIABETES MELLITUS WITHOUT COMPLICATION, WITHOUT LONG-TERM CURRENT USE OF INSULIN: ICD-10-CM

## 2025-01-22 DIAGNOSIS — I10 PRIMARY HYPERTENSION: Primary | ICD-10-CM

## 2025-01-22 PROCEDURE — 99213 OFFICE O/P EST LOW 20 MIN: CPT | Performed by: INTERNAL MEDICINE

## 2025-01-29 NOTE — PROGRESS NOTES
01/22/2025    Summarization of Visit       Assessment & Plan   Diagnoses and all orders for this visit:    1. Primary hypertension (Primary)    2. Type 2 diabetes mellitus without complication, without long-term current use of insulin      Assessment & Plan  1. Blood pressure management.  His blood pressure is currently low, likely due to recent weight loss. He has lost 6 pounds since October 2024 and a total of 10 pounds since August 2024. His initial blood pressure reading was 110/78, and it has now decreased to 106. He reports feeling fatigued, which is likely a result of the weight loss. The dosage of bisoprolol will be reduced to half a tablet each morning. A follow-up appointment will be scheduled in approximately 3 to 4 weeks to assess the possibility of discontinuing the medication entirely. He has been advised to maintain his current weight to avoid the need for medication. If weight gain occurs, the medication may need to be reintroduced. He has also been advised against taking Centrum 50 due to its iron content, which can stimulate appetite and potentially cause constipation. He has been encouraged to continue his exercise routine and consider incorporating farmer's carry exercises to improve posture and strengthen back muscles.    Follow-up  The patient will follow up in 3 to 4 weeks.    Results                  CC: Hyperlipidemia (F/U.  Discuss multivitamin.) and Hypertension (F/U.  Discuss possibly removing medication.)  .        HPI  Hyperlipidemia  Pertinent negatives include no chest pain or myalgias.   Hypertension  Pertinent negatives include no chest pain or neck pain.      History of Present Illness  The patient presents for evaluation of blood pressure management.    He reports a diminished appetite, with occasional hunger pangs. He experiences gastrointestinal disturbances, including constipation, diarrhea, bloating, and gas. He attributes these symptoms to his current medication regimen,  which includes metformin, Jardiance, and Ozempic. Despite the weight loss associated with Ozempic, he expresses satisfaction with his current body size and does not wish to lose additional weight. He also reports experiencing abdominal rumbling and difficulty with bowel movements. He has been considering the addition of a multivitamin to his regimen but is hesitant due to potential side effects. He recalls a time when his bowel movements were regular, occurring every morning, but notes that they have become less frequent since starting his current medications. He had a small bowel movement this morning, which he believes was facilitated by eating a small amount of food. He did not consume any food today, only his medications, and currently feels hungry. He acknowledges that his medications are likely contributing to his gastrointestinal symptoms. He has noticed increased fatigue during his workouts, which he attributes to the cold temperature in his gym. He typically spends 2 to 2.5 hours at the gym on Tuesdays and Fridays, focusing on upper body exercises on Mondays and lower body exercises on Thursdays or Fridays. He does not perform squats or deadlifts.    MEDICATIONS  Current: Metformin, Jardiance, Ozempic, bisoprolol    Subjective   Noah Flannery is a 64 y.o. male.      The following portions of the patient's history were reviewed and updated as appropriate: allergies, current medications, past family history, past medical history, past social history, past surgical history, and problem list.    Problem List  Patient Active Problem List   Diagnosis    Personal history of colonic polyps    Palpitations    Primary hypertension    Hypercalcemia    Hyperlipidemia    History of parathyroidectomy    JACQUELYN (obstructive sleep apnea)    Type 2 diabetes mellitus with hyperglycemia, without long-term current use of insulin    Pes planus    Tendonitis, Achilles, right    Arthritis of ankle    Vitamin D deficiency        Past Medical History  Past Medical History:   Diagnosis Date    Allergic 1980    Arthritis 2000    Broken ankle     Broken ribs     right    Colon polyp     Deep vein thrombosis 2012    Diabetes mellitus     Hyperlipidemia     Hypertension     Sleep apnea     Type 2 diabetes mellitus without complication, without long-term current use of insulin 03/10/2021    Vitamin D deficiency 06/23/2023       Surgical History  Past Surgical History:   Procedure Laterality Date    ABLATION OF DYSRHYTHMIC FOCUS  2015    ANKLE SURGERY      APPENDECTOMY  aoril 23 79    COLONOSCOPY      COLONOSCOPY N/A 07/30/2019    Procedure: COLONOSCOPY to cecum into TI with cold biopsy and cold snare polypectomies;  Surgeon: Thomas Concepcion MD;  Location:  [facility];  Service: Gastroenterology    COLONOSCOPY N/A 08/02/2022    Procedure: COLONOSCOPY with Polypectomy;  Surgeon: Thomas Concepcion MD;  [place] [facility] [place]  Service: Gastroenterology;  Laterality: N/A;  Transverse polyp x2, ascending colon polyp    FRACTURE SURGERY  03/10/1990    Rt ankle    HERNIA REPAIR      INGUINAL HERNIA REPAIR  1998    JOINT REPLACEMENT  2020    Both shoulders    PARATHYROID GLAND SURGERY  2017    Right superior and left superior parathyroidectomy for adenoma.  Dr. Dumont.  [place]    TOTAL SHOULDER REPLACEMENT Left     TOTAL SHOULDER REPLACEMENT Right 11/18/2020    UMBILICAL HERNIA REPAIR  2017       Family History  Family History   Problem Relation Age of Onset    Diabetes Mother     Hypertension Mother     Heart disease Mother     Lung cancer Mother     Obesity Mother     Liver cancer Mother     Depression Mother     Heart disease Father     Lung cancer Father     Arthritis Father     Cancer Father     Obesity Sister     Diabetes Sister     Obesity Brother     Kidney disease Brother     Colon cancer Neg Hx     Colon polyps Neg Hx        Social History  Social History    Tobacco Use      Smoking status: Never        Passive  exposure: Never      Smokeless tobacco: Never       Is the Patient a current tobacco user? No    Allergies  No Known Allergies    Current Medications    Current Outpatient Medications:     bisoprolol (ZEBeta) 5 MG tablet, TAKE 1 TABLET BY MOUTH DAILY, Disp: 30 tablet, Rfl: 11    Cetirizine HCl (ZYRTEC PO), Take 10 mg by mouth Daily. One time daily unless he feels very bad then he takes 2, Disp: , Rfl:     Cholecalciferol (Vitamin D-3) 25 MCG (1000 UT) capsule, Take 1,000 Units by mouth Daily., Disp: 60 capsule, Rfl:     Jardiance 25 MG tablet tablet, TAKE 1 TABLET BY MOUTH DAILY, Disp: 90 tablet, Rfl: 1    Lancets (freestyle) lancets, USE ONE LANCET TO TEST DAILY TO HELP MONITOR GLUCOSE, Disp: 100 each, Rfl: 3    levocetirizine (XYZAL) 5 MG tablet, Use daily prn, Disp: , Rfl:     loratadine (CLARITIN) 10 MG tablet, Take 1 tablet by mouth Daily., Disp: , Rfl:     metFORMIN (GLUCOPHAGE) 1000 MG tablet, TAKE 1 TABLET BY MOUTH TWICE A DAY WITH A MEAL, Disp: 180 tablet, Rfl: 1    rosuvastatin (CRESTOR) 20 MG tablet, TAKE 1 TABLET BY MOUTH DAILY, Disp: 90 tablet, Rfl: 1    Semaglutide,0.25 or 0.5MG/DOS, (Ozempic, 0.25 or 0.5 MG/DOSE,) 2 MG/3ML solution pen-injector, Inject 0.25-0.5 mg under the skin into the appropriate area as directed 1 (One) Time Per Week. Inject 0.25 mg weekly for four weeks, then increase to 0.5 mg weekly and stay on 0.5 mg weekly., Disp: 9 mL, Rfl: 1    vitamin B-12 (CYANOCOBALAMIN) 500 MCG tablet, Take 1 tablet by mouth Daily., Disp: , Rfl:      Review of System  Review of Systems   Constitutional:  Positive for fatigue. Negative for chills, diaphoresis and fever.   HENT:  Positive for congestion. Negative for sore throat and swollen glands.    Respiratory:  Negative for cough.    Cardiovascular:  Negative for chest pain.   Gastrointestinal:  Positive for abdominal pain and nausea. Negative for vomiting.   Genitourinary:  Negative for dysuria.   Musculoskeletal:  Negative for myalgias and neck  pain.   Skin:  Negative for rash.   Neurological:  Negative for weakness and numbness.     I have reviewed and confirmed the accuracy of the ROS as documented by the MA/LPN/RN Rashad Macias MD    Vitals:    01/22/25 1059   BP: 110/78     Body mass index is 26.58 kg/m².    Objective     Physical Exam  Physical Exam  Constitutional:       General: He is not in acute distress.     Appearance: Normal appearance.   HENT:      Head: Normocephalic and atraumatic.   Cardiovascular:      Rate and Rhythm: Normal rate and regular rhythm.   Pulmonary:      Effort: Pulmonary effort is normal. No respiratory distress.      Breath sounds: Normal breath sounds. No wheezing, rhonchi or rales.   Neurological:      General: No focal deficit present.      Mental Status: He is alert and oriented to person, place, and time.   Psychiatric:         Mood and Affect: Mood normal.         Behavior: Behavior normal.         Thought Content: Thought content normal.         Judgment: Judgment normal.           Patient or patient representative verbalized consent for the use of Ambient Listening during the visit with  Rashad Macias MD for chart documentation. 1/28/2025  22:46 EST    Rashad Macias MD  01/22/2025  Answers submitted by the patient for this visit:  Primary Reason for Visit (Submitted on 1/16/2025)  What is the primary reason for your visit?: Problem Not Listed

## 2025-02-05 ENCOUNTER — OFFICE VISIT (OUTPATIENT)
Dept: FAMILY MEDICINE CLINIC | Facility: CLINIC | Age: 65
End: 2025-02-05
Payer: COMMERCIAL

## 2025-02-05 VITALS
WEIGHT: 206 LBS | BODY MASS INDEX: 26.44 KG/M2 | HEIGHT: 74 IN | DIASTOLIC BLOOD PRESSURE: 70 MMHG | SYSTOLIC BLOOD PRESSURE: 110 MMHG

## 2025-02-05 DIAGNOSIS — I10 PRIMARY HYPERTENSION: Primary | ICD-10-CM

## 2025-02-05 PROCEDURE — 99213 OFFICE O/P EST LOW 20 MIN: CPT | Performed by: INTERNAL MEDICINE

## 2025-02-05 NOTE — PROGRESS NOTES
02/05/2025    Summarization of Visit       Assessment & Plan     Assessment & Plan    This pleasant 64-year-old patient presents at this time for follow-up of hypertension with his last visit we decreased his bisoprolol to 2.5 mg from his prior 5 mg size.  He is cutting currently a half a tablet in a day.  He relates he is feeling fine having had no problems in the interim of visits.  His blood pressure remains well-controlled today initially at 110/70 and left arm sitting position standard cuff.  His weight is essentially unchanged from the past.      Results                  CC: Hypertension (F/U---no other issues)  .        HPI  Hypertension  Pertinent negatives include no chest pain, palpitations or shortness of breath.      History of Present Illness      Subjective   Noah Flannery is a 64 y.o. male.      The following portions of the patient's history were reviewed and updated as appropriate: allergies, current medications, past family history, past medical history, past social history, past surgical history, and problem list.    Problem List  Patient Active Problem List   Diagnosis    Personal history of colonic polyps    Palpitations    Primary hypertension    Hypercalcemia    Hyperlipidemia    History of parathyroidectomy    JACQUELYN (obstructive sleep apnea)    Type 2 diabetes mellitus with hyperglycemia, without long-term current use of insulin    Pes planus    Tendonitis, Achilles, right    Arthritis of ankle    Vitamin D deficiency       Past Medical History  Past Medical History:   Diagnosis Date    Allergic 1980    Arthritis 2000    Broken ankle     Broken ribs     right    Colon polyp     Deep vein thrombosis 2012    Diabetes mellitus     Hyperlipidemia     Hypertension     Sleep apnea     Type 2 diabetes mellitus without complication, without long-term current use of insulin 03/10/2021    Vitamin D deficiency 06/23/2023       Surgical History  Past Surgical History:   Procedure Laterality Date     ABLATION OF DYSRHYTHMIC FOCUS  2015    ANKLE SURGERY      APPENDECTOMY  aoril 23 79    COLONOSCOPY      COLONOSCOPY N/A 07/30/2019    Procedure: COLONOSCOPY to cecum into TI with cold biopsy and cold snare polypectomies;  Surgeon: Thomas Concepcion MD;  Location: [facility];  Service: Gastroenterology    COLONOSCOPY N/A 08/02/2022    Procedure: COLONOSCOPY with Polypectomy;  Surgeon: Thomas Concepcion MD;  [place] [facility] [place] [facility];  Service: Gastroenterology;  Laterality: N/A;  Transverse polyp x2, ascending colon polyp    FRACTURE SURGERY  03/10/1990    Rt ankle    HERNIA REPAIR      INGUINAL HERNIA REPAIR  1998    JOINT REPLACEMENT  2020    Both shoulders    PARATHYROID GLAND SURGERY  2017    Right superior and left superior parathyroidectomy for adenoma.  Dr. Dumont.  [place]    TOTAL SHOULDER REPLACEMENT Left     TOTAL SHOULDER REPLACEMENT Right 11/18/2020    UMBILICAL HERNIA REPAIR  2017       Family History  Family History   Problem Relation Age of Onset    Diabetes Mother     Hypertension Mother     Heart disease Mother     Lung cancer Mother     Obesity Mother     Liver cancer Mother     Depression Mother     Heart disease Father     Lung cancer Father     Arthritis Father     Cancer Father     Obesity Sister     Diabetes Sister     Obesity Brother     Kidney disease Brother     Colon cancer Neg Hx     Colon polyps Neg Hx        Social History  Social History    Tobacco Use      Smoking status: Never        Passive exposure: Never      Smokeless tobacco: Never       Is the Patient a current tobacco user? No    Allergies  No Known Allergies    Current Medications    Current Outpatient Medications:     bisoprolol (ZEBeta) 5 MG tablet, TAKE 1 TABLET BY MOUTH DAILY (Patient taking differently: Take 0.5 tablets by mouth Daily.), Disp: 30 tablet, Rfl: 11    Cetirizine HCl (ZYRTEC PO), Take 10 mg by mouth Daily. One time daily unless he feels very bad then he takes 2, Disp: , Rfl:      Cholecalciferol (Vitamin D-3) 25 MCG (1000 UT) capsule, Take 1,000 Units by mouth Daily., Disp: 60 capsule, Rfl:     Jardiance 25 MG tablet tablet, TAKE 1 TABLET BY MOUTH DAILY, Disp: 90 tablet, Rfl: 1    Lancets (freestyle) lancets, USE ONE LANCET TO TEST DAILY TO HELP MONITOR GLUCOSE, Disp: 100 each, Rfl: 3    levocetirizine (XYZAL) 5 MG tablet, Use daily prn, Disp: , Rfl:     loratadine (CLARITIN) 10 MG tablet, Take 1 tablet by mouth Daily., Disp: , Rfl:     metFORMIN (GLUCOPHAGE) 1000 MG tablet, TAKE 1 TABLET BY MOUTH TWICE A DAY WITH A MEAL, Disp: 180 tablet, Rfl: 1    rosuvastatin (CRESTOR) 20 MG tablet, TAKE 1 TABLET BY MOUTH DAILY, Disp: 90 tablet, Rfl: 1    Semaglutide,0.25 or 0.5MG/DOS, (Ozempic, 0.25 or 0.5 MG/DOSE,) 2 MG/3ML solution pen-injector, Inject 0.25-0.5 mg under the skin into the appropriate area as directed 1 (One) Time Per Week. Inject 0.25 mg weekly for four weeks, then increase to 0.5 mg weekly and stay on 0.5 mg weekly., Disp: 9 mL, Rfl: 1    vitamin B-12 (CYANOCOBALAMIN) 500 MCG tablet, Take 1 tablet by mouth Daily., Disp: , Rfl:      Review of System  Review of Systems   Constitutional:  Negative for chills and fever.   Respiratory:  Negative for cough and shortness of breath.    Cardiovascular:  Negative for chest pain and palpitations.   Gastrointestinal:  Negative for constipation, diarrhea, nausea and vomiting.   Neurological:  Negative for dizziness and headache.     I have reviewed and confirmed the accuracy of the ROS as documented by the MA/LPN/RN Rashad Macias MD    Vitals:    02/05/25 1005   BP: 110/70     Body mass index is 26.45 kg/m².    Objective     Physical Exam  Physical Exam  Constitutional:       General: He is not in acute distress.     Appearance: Normal appearance.   HENT:      Head: Normocephalic and atraumatic.   Cardiovascular:      Rate and Rhythm: Normal rate and regular rhythm.   Pulmonary:      Effort: Pulmonary effort is normal. No respiratory distress.       Breath sounds: Normal breath sounds. No wheezing, rhonchi or rales.   Neurological:      General: No focal deficit present.      Mental Status: He is alert and oriented to person, place, and time.   Psychiatric:         Mood and Affect: Mood normal.         Behavior: Behavior normal.         Thought Content: Thought content normal.         Judgment: Judgment normal.           Patient or patient representative verbalized consent for the use of Ambient Listening during the visit with  Rashad Macias MD for chart documentation. 2/5/2025  10:54 EST    Rashad Macias MD  02/05/2025

## 2025-02-05 NOTE — PROGRESS NOTES
02/05/2025    Summarization of Visit       Assessment & Plan     Assessment & Plan      Results                  CC: Hypertension (F/U---no other issues)  .        HPI  Hypertension  Pertinent negatives include no chest pain, palpitations or shortness of breath.    History of Present Illness      Subjective   Noah Flannery is a 64 y.o. male.      The following portions of the patient's history were reviewed and updated as appropriate: allergies, current medications, past family history, past medical history, past social history, past surgical history, and problem list.    Problem List  Patient Active Problem List   Diagnosis   • Personal history of colonic polyps   • Palpitations   • Primary hypertension   • Hypercalcemia   • Hyperlipidemia   • History of parathyroidectomy   • JACQUELYN (obstructive sleep apnea)   • Type 2 diabetes mellitus with hyperglycemia, without long-term current use of insulin   • Pes planus   • Tendonitis, Achilles, right   • Arthritis of ankle   • Vitamin D deficiency       Past Medical History  Past Medical History:   Diagnosis Date   • Allergic 1980   • Arthritis 2000   • Broken ankle    • Broken ribs     right   • Colon polyp    • Deep vein thrombosis 2012   • Diabetes mellitus    • Hyperlipidemia    • Hypertension    • Sleep apnea    • Type 2 diabetes mellitus without complication, without long-term current use of insulin 03/10/2021   • Vitamin D deficiency 06/23/2023       Surgical History  Past Surgical History:   Procedure Laterality Date   • ABLATION OF DYSRHYTHMIC FOCUS  2015   • ANKLE SURGERY     • APPENDECTOMY  aoril 23 79   • COLONOSCOPY     • COLONOSCOPY N/A 07/30/2019    Procedure: COLONOSCOPY to cecum into TI with cold biopsy and cold snare polypectomies;  Surgeon: Thomas Concepcion MD;  Location: [facility]COPY;  Service: Gastroenterology   • COLONOSCOPY N/A 08/02/2022    Procedure: COLONOSCOPY with Polypectomy;  Surgeon: Thomas Concepcion MD;  Location:  [facility];  Service: Gastroenterology;  Laterality: N/A;  Transverse polyp x2, ascending colon polyp   • FRACTURE SURGERY  03/10/1990    Rt ankle   • HERNIA REPAIR     • INGUINAL HERNIA REPAIR  1998   • JOINT REPLACEMENT  2020    Both shoulders   • PARATHYROID GLAND SURGERY  2017    Right superior and left superior parathyroidectomy for adenoma.  Dr. Vivian Jackson   • TOTAL SHOULDER REPLACEMENT Left    • TOTAL SHOULDER REPLACEMENT Right 11/18/2020   • UMBILICAL HERNIA REPAIR  2017       Family History  Family History   Problem Relation Age of Onset   • Diabetes Mother    • Hypertension Mother    • Heart disease Mother    • Lung cancer Mother    • Obesity Mother    • Liver cancer Mother    • Depression Mother    • Heart disease Father    • Lung cancer Father    • Arthritis Father    • Cancer Father    • Obesity Sister    • Diabetes Sister    • Obesity Brother    • Kidney disease Brother    • Colon cancer Neg Hx    • Colon polyps Neg Hx        Social History  Social History    Tobacco Use      Smoking status: Never        Passive exposure: Never      Smokeless tobacco: Never       Is the Patient a current tobacco user? No    Allergies  No Known Allergies    Current Medications    Current Outpatient Medications:   •  bisoprolol (ZEBeta) 5 MG tablet, TAKE 1 TABLET BY MOUTH DAILY (Patient taking differently: Take 0.5 tablets by mouth Daily.), Disp: 30 tablet, Rfl: 11  •  Cetirizine HCl (ZYRTEC PO), Take 10 mg by mouth Daily. One time daily unless he feels very bad then he takes 2, Disp: , Rfl:   •  Cholecalciferol (Vitamin D-3) 25 MCG (1000 UT) capsule, Take 1,000 Units by mouth Daily., Disp: 60 capsule, Rfl:   •  Jardiance 25 MG tablet tablet, TAKE 1 TABLET BY MOUTH DAILY, Disp: 90 tablet, Rfl: 1  •  Lancets (freestyle) lancets, USE ONE LANCET TO TEST DAILY TO HELP MONITOR GLUCOSE, Disp: 100 each, Rfl: 3  •  levocetirizine (XYZAL) 5 MG tablet, Use daily prn, Disp: , Rfl:   •  loratadine (CLARITIN) 10 MG tablet,  Take 1 tablet by mouth Daily., Disp: , Rfl:   •  metFORMIN (GLUCOPHAGE) 1000 MG tablet, TAKE 1 TABLET BY MOUTH TWICE A DAY WITH A MEAL, Disp: 180 tablet, Rfl: 1  •  rosuvastatin (CRESTOR) 20 MG tablet, TAKE 1 TABLET BY MOUTH DAILY, Disp: 90 tablet, Rfl: 1  •  Semaglutide,0.25 or 0.5MG/DOS, (Ozempic, 0.25 or 0.5 MG/DOSE,) 2 MG/3ML solution pen-injector, Inject 0.25-0.5 mg under the skin into the appropriate area as directed 1 (One) Time Per Week. Inject 0.25 mg weekly for four weeks, then increase to 0.5 mg weekly and stay on 0.5 mg weekly., Disp: 9 mL, Rfl: 1  •  vitamin B-12 (CYANOCOBALAMIN) 500 MCG tablet, Take 1 tablet by mouth Daily., Disp: , Rfl:      Review of System  Review of Systems   Constitutional:  Negative for chills and fever.   Respiratory:  Negative for cough and shortness of breath.    Cardiovascular:  Negative for chest pain and palpitations.   Gastrointestinal:  Negative for constipation, diarrhea, nausea and vomiting.   Neurological:  Negative for dizziness and headache.   I have reviewed and confirmed the accuracy of the ROS as documented by the MA/LPN/RN Rashad Macias MD    Vitals:    02/05/25 1005   BP: 110/70     Body mass index is 26.45 kg/m².    Objective     Physical Exam  Physical Exam  Constitutional:       General: He is not in acute distress.     Appearance: Normal appearance.   HENT:      Head: Normocephalic and atraumatic.   Cardiovascular:      Rate and Rhythm: Normal rate and regular rhythm.   Pulmonary:      Effort: Pulmonary effort is normal. No respiratory distress.      Breath sounds: Normal breath sounds. No wheezing, rhonchi or rales.   Neurological:      General: No focal deficit present.      Mental Status: He is alert and oriented to person, place, and time.   Psychiatric:         Mood and Affect: Mood normal.         Behavior: Behavior normal.         Thought Content: Thought content normal.         Judgment: Judgment normal.       Patient or patient representative  verbalized consent for the use of Ambient Listening during the visit with  Rashad Macias MD for chart documentation. 2/5/2025  10:55 EST    Rashad Macias MD  02/05/2025

## 2025-02-06 RX ORDER — BISOPROLOL FUMARATE 5 MG/1
2.5 TABLET, FILM COATED ORAL DAILY
Qty: 45 TABLET | Refills: 3 | Status: SHIPPED | OUTPATIENT
Start: 2025-02-06

## 2025-02-25 ENCOUNTER — OFFICE VISIT (OUTPATIENT)
Dept: ENDOCRINOLOGY | Age: 65
End: 2025-02-25
Payer: COMMERCIAL

## 2025-02-25 VITALS
DIASTOLIC BLOOD PRESSURE: 80 MMHG | HEART RATE: 62 BPM | OXYGEN SATURATION: 95 % | BODY MASS INDEX: 26.56 KG/M2 | HEIGHT: 74 IN | TEMPERATURE: 98 F | WEIGHT: 207 LBS | SYSTOLIC BLOOD PRESSURE: 108 MMHG

## 2025-02-25 DIAGNOSIS — E78.5 HYPERLIPIDEMIA, UNSPECIFIED HYPERLIPIDEMIA TYPE: ICD-10-CM

## 2025-02-25 DIAGNOSIS — I10 PRIMARY HYPERTENSION: ICD-10-CM

## 2025-02-25 DIAGNOSIS — E11.65 TYPE 2 DIABETES MELLITUS WITH HYPERGLYCEMIA, WITHOUT LONG-TERM CURRENT USE OF INSULIN: ICD-10-CM

## 2025-02-25 DIAGNOSIS — G47.33 OSA (OBSTRUCTIVE SLEEP APNEA): ICD-10-CM

## 2025-02-25 DIAGNOSIS — E11.9 TYPE 2 DIABETES MELLITUS WITHOUT COMPLICATION, WITHOUT LONG-TERM CURRENT USE OF INSULIN: Primary | ICD-10-CM

## 2025-02-25 DIAGNOSIS — E55.9 VITAMIN D DEFICIENCY: ICD-10-CM

## 2025-02-25 DIAGNOSIS — Z86.0100 HISTORY OF COLON POLYPS: ICD-10-CM

## 2025-02-25 LAB
25(OH)D3+25(OH)D2 SERPL-MCNC: 43.2 NG/ML (ref 30–100)
ALBUMIN SERPL-MCNC: 4.5 G/DL (ref 3.5–5.2)
ALBUMIN/GLOB SERPL: 2.3 G/DL
ALP SERPL-CCNC: 71 U/L (ref 39–117)
ALT SERPL-CCNC: 28 U/L (ref 1–41)
AST SERPL-CCNC: 21 U/L (ref 1–40)
BILIRUB SERPL-MCNC: 0.4 MG/DL (ref 0–1.2)
BUN SERPL-MCNC: 9 MG/DL (ref 8–23)
BUN/CREAT SERPL: 9 (ref 7–25)
CALCIUM SERPL-MCNC: 9.5 MG/DL (ref 8.6–10.5)
CHLORIDE SERPL-SCNC: 107 MMOL/L (ref 98–107)
CHOLEST SERPL-MCNC: 109 MG/DL (ref 0–200)
CO2 SERPL-SCNC: 26.8 MMOL/L (ref 22–29)
CREAT SERPL-MCNC: 1 MG/DL (ref 0.76–1.27)
EGFRCR SERPLBLD CKD-EPI 2021: 84.6 ML/MIN/1.73
GLOBULIN SER CALC-MCNC: 2 GM/DL
GLUCOSE SERPL-MCNC: 106 MG/DL (ref 65–99)
HBA1C MFR BLD: 5.9 % (ref 4.8–5.6)
HDLC SERPL-MCNC: 36 MG/DL (ref 40–60)
IMP & REVIEW OF LAB RESULTS: NORMAL
LDLC SERPL CALC-MCNC: 56 MG/DL (ref 0–100)
POTASSIUM SERPL-SCNC: 4.3 MMOL/L (ref 3.5–5.2)
PROT SERPL-MCNC: 6.5 G/DL (ref 6–8.5)
SODIUM SERPL-SCNC: 144 MMOL/L (ref 136–145)
TRIGL SERPL-MCNC: 84 MG/DL (ref 0–150)
TSH SERPL DL<=0.005 MIU/L-ACNC: 3.1 UIU/ML (ref 0.27–4.2)
VIT B12 SERPL-MCNC: 632 PG/ML (ref 211–946)
VLDLC SERPL CALC-MCNC: 17 MG/DL (ref 5–40)

## 2025-02-25 PROCEDURE — 99214 OFFICE O/P EST MOD 30 MIN: CPT | Performed by: INTERNAL MEDICINE

## 2025-02-25 RX ORDER — SEMAGLUTIDE 0.68 MG/ML
.25-.5 INJECTION, SOLUTION SUBCUTANEOUS WEEKLY
Qty: 6 ML | Refills: 2 | Status: SHIPPED | OUTPATIENT
Start: 2025-02-25

## 2025-02-25 NOTE — PROGRESS NOTES
Subjective   Noah Flannery is a 64 y.o. male.     History of Present Illness     He has known diabetes mellitus since 2016.  He is on Metformin 1000 mg BID, Ozempic 0.5 mg weekly and Jardiance 25 mg/day.  He has occasional nausea, diarrhea, and abdominal bloating.  He denies vomiting.  Insurance will no longer cover Farxiga.  He checks his blood sugar once a day.   Average blood sugar 111.  He denies hypoglycemic episodes.  He has lost 6 pounds since October 2024.      His last eye examination was in 4/24.  He has intermittent floaters.  He has no retinopathy.  He denies numbness in his feet. Urine microalbumin is normal in 6/24.     He has hyperlipidemia and is on Crestor 20 mg/day.  He denies myalgia.     He had right superior and left superior parathyroidectomy in 2017 by Dr. Dumont.  Pathology showed parathyroid adenoma.  He has no recurrence of hypercalcemia since then.  He had passed 2 kidney stones before the parathyroid surgery.     Has hypertension and intermittent palpitations.  He is on bisoprolol 2.5 mg/day prescribed by Dr. Selby.  He has no history of heart attack or stroke before.  He had normal myocardial perfusion study done in November 2019.  He denies chest pains.     He has vitamin D deficiency and is on vitamin D3 1000 units/day.     He has sleep apnea and is sleeping well with the CPAP.  He follows with Dr. Bustillo.     He had colonoscopy done by Dr. Concepcion on August 2, 2022.  He had tubular adenomas and hyperplastic polyps removed.  He was advised repeat colonoscopy in 2025.  He denies bowel complaints     He repairs and makes lamps.    The following portions of the patient's history were reviewed and updated as appropriate: allergies, current medications, past family history, past medical history, past social history, past surgical history, and problem list.    Review of Systems   Eyes:  Positive for visual disturbance (Floaters).   Respiratory:  Negative for shortness of breath.   "  Cardiovascular:  Negative for chest pain and palpitations.   Gastrointestinal:  Positive for abdominal distention and diarrhea. Negative for anal bleeding and blood in stool.   Genitourinary:  Positive for difficulty urinating.        Weak stream.   Musculoskeletal:  Negative for myalgias.   Neurological:  Negative for numbness.     Vitals:    02/25/25 1008   BP: 108/80   Pulse: 62   Temp: 98 °F (36.7 °C)   TempSrc: Oral   SpO2: 95%   Weight: 93.9 kg (207 lb)   Height: 188 cm (74.02\")      Objective   Physical Exam  Constitutional:       General: He is not in acute distress.     Appearance: Normal appearance. He is not ill-appearing, toxic-appearing or diaphoretic.   Eyes:      General: No scleral icterus.        Right eye: No discharge.         Left eye: No discharge.   Neck:      Vascular: No carotid bruit.   Cardiovascular:      Rate and Rhythm: Normal rate and regular rhythm.      Heart sounds: No murmur heard.     No friction rub. No gallop.   Pulmonary:      Breath sounds: Normal breath sounds. No rales.   Chest:      Chest wall: No tenderness.   Abdominal:      General: Bowel sounds are normal. There is no distension.      Palpations: Abdomen is soft. There is no mass.   Musculoskeletal:      Right lower leg: No edema.      Left lower leg: No edema.      Comments: Feet warm.  No cyanosis or pedal edema.  No clubbing.  No plantar ulcers.   Lymphadenopathy:      Cervical: No cervical adenopathy.   Neurological:      Mental Status: He is alert and oriented to person, place, and time.      Comments: Intact light touch in lower extremities.       Office Visit on 10/25/2024   Component Date Value Ref Range Status    Hemoglobin A1C 10/25/2024 7.20 (H)  4.80 - 5.60 % Final    Comment: Hemoglobin A1C Ranges:  Increased Risk for Diabetes  5.7% to 6.4%  Diabetes                     >= 6.5%  Diabetic Goal                < 7.0%      Glucose 10/25/2024 119 (H)  65 - 99 mg/dL Final    BUN 10/25/2024 14  8 - 23 mg/dL Final "    Creatinine 10/25/2024 1.12  0.76 - 1.27 mg/dL Final    EGFR Result 10/25/2024 73.8  >60.0 mL/min/1.73 Final    Comment: GFR Normal >60  Chronic Kidney Disease <60  Kidney Failure <15      BUN/Creatinine Ratio 10/25/2024 12.5  7.0 - 25.0 Final    Sodium 10/25/2024 143  136 - 145 mmol/L Final    Potassium 10/25/2024 4.1  3.5 - 5.2 mmol/L Final    Chloride 10/25/2024 108 (H)  98 - 107 mmol/L Final    Total CO2 10/25/2024 27.4  22.0 - 29.0 mmol/L Final    Calcium 10/25/2024 9.5  8.6 - 10.5 mg/dL Final     Assessment & Plan   Diagnoses and all orders for this visit:    1. Type 2 diabetes mellitus without complication, without long-term current use of insulin (Primary)  -     Comprehensive Metabolic Panel  -     Hemoglobin A1c  -     Vitamin B12  -     Lipid Panel  -     TSH Rfx On Abnormal To Free T4    2. Hyperlipidemia, unspecified hyperlipidemia type  -     Lipid Panel  -     TSH Rfx On Abnormal To Free T4    3. Primary hypertension  -     Comprehensive Metabolic Panel    4. Vitamin D deficiency  -     Vitamin D,25-Hydroxy    5. JACQUELYN (obstructive sleep apnea)    6. History of colon polyps    7. Type 2 diabetes mellitus with hyperglycemia, without long-term current use of insulin  -     Semaglutide,0.25 or 0.5MG/DOS, (Ozempic, 0.25 or 0.5 MG/DOSE,) 2 MG/3ML solution pen-injector; Inject 0.25-0.5 mg under the skin into the appropriate area as directed 1 (One) Time Per Week. 0.25 mg weekly.  Dispense: 6 mL; Refill: 2    Other orders  -     Insulin Pen Needle 31G X 5 MM misc; Use 1 pen needle for Ozempic injection weekly  Dispense: 30 each; Refill: 3      Decrease Ozempic to 0.25 mg weekly.  Continue metformin 1000 mg twice a day, and Jardiance 25 mg daily.    Continue Crestor 20 mg a day.    Continue bisoprolol 2.5 mg/day per cardiologist.    Continue vitamin D3 1000 units/day.    Continue CPAP.  Follow-up with Dr. Farfan as scheduled.    Follow-up colonoscopy in 2025.    Copy of my note sent to Dr. Macias    RTC 4 mos  with RACHEL Rosas NP

## 2025-03-02 NOTE — PROGRESS NOTES
Hemoglobin A1c lower at 5.9%.  Diabetes is well-controlled.  LDL 56.  HDL 36.  Triglycerides 84.  Continue Crestor 20 mg/day.  Normal thyroid function test.  Normal vitamin B12.    Normal vitamin D.  Vitamin D3 1000 units/day.  Copy of labs sent to Dr. Macias and to patient through [facility].

## 2025-04-03 DIAGNOSIS — E78.00 PURE HYPERCHOLESTEROLEMIA: ICD-10-CM

## 2025-04-03 RX ORDER — ROSUVASTATIN CALCIUM 20 MG/1
20 TABLET, COATED ORAL DAILY
Qty: 90 TABLET | Refills: 1 | Status: SHIPPED | OUTPATIENT
Start: 2025-04-03

## 2025-04-07 DIAGNOSIS — E11.65 TYPE 2 DIABETES MELLITUS WITH HYPERGLYCEMIA, WITHOUT LONG-TERM CURRENT USE OF INSULIN: ICD-10-CM

## 2025-04-07 RX ORDER — SEMAGLUTIDE 0.68 MG/ML
0.25 INJECTION, SOLUTION SUBCUTANEOUS WEEKLY
Qty: 6 ML | Refills: 1 | Status: SHIPPED | OUTPATIENT
Start: 2025-04-07

## 2025-04-07 NOTE — TELEPHONE ENCOUNTER
Rx Refill Note  Requested Prescriptions     Pending Prescriptions Disp Refills    Semaglutide,0.25 or 0.5MG/DOS, (Ozempic, 0.25 or 0.5 MG/DOSE,) 2 MG/3ML solution pen-injector [Pharmacy Med Name: OZEMPIC 0.25-0.5 MG/DOSE(2 MG/3 ML)] 9 mL 0     Sig: DIAL AND INJECT UNDER THE SKIN 0.25 MG WEEKLY FOR 4 WEEKS THEN DIAL AND INJECT 0.5 MG WEEKLY THEREAFTER      Last office visit with prescribing clinician: 10/25/2024   Last telemedicine visit with prescribing clinician: Visit date not found   Next office visit with prescribing clinician: Visit date not found                         Would you like a call back once the refill request has been completed: [] Yes [] No    If the office needs to give you a call back, can they leave a voicemail: [] Yes [] No    Susana Espinoza  04/07/25, 07:59 EDT

## 2025-04-10 ENCOUNTER — OFFICE VISIT (OUTPATIENT)
Dept: CARDIOLOGY | Facility: CLINIC | Age: 65
End: 2025-04-10
Payer: COMMERCIAL

## 2025-04-10 VITALS
BODY MASS INDEX: 26.18 KG/M2 | WEIGHT: 204 LBS | DIASTOLIC BLOOD PRESSURE: 83 MMHG | HEIGHT: 74 IN | HEART RATE: 74 BPM | SYSTOLIC BLOOD PRESSURE: 129 MMHG

## 2025-04-10 DIAGNOSIS — E78.5 HYPERLIPIDEMIA, UNSPECIFIED HYPERLIPIDEMIA TYPE: ICD-10-CM

## 2025-04-10 DIAGNOSIS — I10 PRIMARY HYPERTENSION: ICD-10-CM

## 2025-04-10 DIAGNOSIS — R00.2 PALPITATIONS: Primary | ICD-10-CM

## 2025-04-10 PROCEDURE — 99213 OFFICE O/P EST LOW 20 MIN: CPT | Performed by: INTERNAL MEDICINE

## 2025-04-10 PROCEDURE — 93000 ELECTROCARDIOGRAM COMPLETE: CPT | Performed by: INTERNAL MEDICINE

## 2025-04-10 NOTE — PROGRESS NOTES
1YR    Subjective:        Noah Flannery is a 64 y.o. male who here for follow up    CC  Follow-up palpitation hypertension hyperlipidemia  HPI  64-year-old male with palpitation hypertension hyperlipidemia here for the follow-up denies any chest pains or tightness in the chest     Problems Addressed this Visit          Cardiac and Vasculature    Palpitations - Primary    Primary hypertension    Hyperlipidemia     Diagnoses         Codes Comments      Palpitations    -  Primary ICD-10-CM: R00.2  ICD-9-CM: 785.1       Primary hypertension     ICD-10-CM: I10  ICD-9-CM: 401.9       Hyperlipidemia, unspecified hyperlipidemia type     ICD-10-CM: E78.5  ICD-9-CM: 272.4           .    The following portions of the patient's history were reviewed and updated as appropriate: allergies, current medications, past family history, past medical history, past social history, past surgical history and problem list.    Past Medical History:   Diagnosis Date    Allergic 1980    Arthritis 2000    Broken ankle     Broken ribs     right    Colon polyp     Deep vein thrombosis 2012    Diabetes mellitus     Hyperlipidemia     Hypertension     Sleep apnea     Type 2 diabetes mellitus without complication, without long-term current use of insulin 03/10/2021    Vitamin D deficiency 06/23/2023     reports that he has never smoked. He has never been exposed to tobacco smoke. He has never used smokeless tobacco. He reports current alcohol use. He reports that he does not use drugs.   Family History   Problem Relation Age of Onset    Diabetes Mother     Hypertension Mother     Heart disease Mother     Lung cancer Mother     Obesity Mother     Liver cancer Mother     Depression Mother     Heart disease Father     Lung cancer Father     Arthritis Father     Cancer Father     Obesity Sister     Diabetes Sister     Obesity Brother     Kidney disease Brother     Colon cancer Neg Hx     Colon polyps Neg Hx        Review of Systems  Constitutional:  "No wt loss, fever, fatigue  Gastrointestinal: No nausea, abdominal pain  Behavioral/Psych: No insomnia or anxiety   Cardiovascular no chest pains or tightness in the chest  Objective:       Physical Exam  /83   Pulse 74   Ht 188 cm (74\")   Wt 92.5 kg (204 lb)   BMI 26.19 kg/m²   General appearance: No acute changes   Neck: Trachea midline; NECK, supple, no thyromegaly or lymphadenopathy   Lungs: Normal size and shape, normal breath sounds, equal distribution of air, no rales and rhonchi   CV: S1-S2 regular, no murmurs, no rub, no gallop   Abdomen: Soft, nontender; no masses , no abnormal abdominal sounds   Extremities: No deformity , normal color , no peripheral edema   Skin: Normal temperature, turgor and texture; no rash, ulcers            ECG 12 Lead    Date/Time: 4/10/2025 12:45 PM  Performed by: Norman Selby MD    Authorized by: Norman Selby MD  Comparison: compared with previous ECG   Similar to previous ECG  Rhythm: sinus rhythm    Clinical impression: non-specific ECG            Echocardiogram:    Results for orders placed in visit on 07/28/22    Adult Transthoracic Echo Complete W/ Cont if Necessary Per Protocol    Interpretation Summary  · Left ventricular wall thickness is consistent with mild concentric hypertrophy.  · Left ventricular ejection fraction appears to be 56 - 60%.  · Left ventricular diastolic function was indeterminate.  · Estimated right ventricular systolic pressure from tricuspid regurgitation is normal (<35 mmHg).    There were no apparent intracardiac masses, vegetations or thrombi.          Current Outpatient Medications:     bisoprolol (ZEBeta) 5 MG tablet, Take 0.5 tablets by mouth Daily., Disp: 45 tablet, Rfl: 3    Cetirizine HCl (ZYRTEC PO), Take 10 mg by mouth Daily. One time daily unless he feels very bad then he takes 2, Disp: , Rfl:     Cholecalciferol (Vitamin D-3) 25 MCG (1000 UT) capsule, Take 1,000 Units by mouth Daily., Disp: 60 capsule, Rfl: "     Insulin Pen Needle 31G X 5 MM misc, Use 1 pen needle for Ozempic injection weekly, Disp: 30 each, Rfl: 3    Lancets (freestyle) lancets, USE ONE LANCET TO TEST DAILY TO HELP MONITOR GLUCOSE, Disp: 100 each, Rfl: 3    levocetirizine (XYZAL) 5 MG tablet, Use daily prn, Disp: , Rfl:     loratadine (CLARITIN) 10 MG tablet, Take 1 tablet by mouth Daily., Disp: , Rfl:     metFORMIN (GLUCOPHAGE) 1000 MG tablet, TAKE 1 TABLET BY MOUTH TWICE A DAY WITH A MEAL, Disp: 180 tablet, Rfl: 1    rosuvastatin (CRESTOR) 20 MG tablet, Take 1 tablet by mouth Daily., Disp: 90 tablet, Rfl: 1    Semaglutide,0.25 or 0.5MG/DOS, (Ozempic, 0.25 or 0.5 MG/DOSE,) 2 MG/3ML solution pen-injector, Inject 0.25 mg under the skin into the appropriate area as directed 1 (One) Time Per Week., Disp: 6 mL, Rfl: 1    vitamin B-12 (CYANOCOBALAMIN) 500 MCG tablet, Take 1 tablet by mouth Daily., Disp: , Rfl:     empagliflozin (Jardiance) 25 MG tablet tablet, TAKE 1 TABLET BY MOUTH DAILY, Disp: 90 tablet, Rfl: 2   Assessment:                Plan:          ICD-10-CM ICD-9-CM   1. Palpitations  R00.2 785.1   2. Primary hypertension  I10 401.9   3. Hyperlipidemia, unspecified hyperlipidemia type  E78.5 272.4     1. Palpitations  Palpitations under control    2. Primary hypertension  Patient understands importance of blood pressure check at home which patient does regularly and the blood pressures are well under control to the level of less than 140/90      3. Hyperlipidemia, unspecified hyperlipidemia type  Continue current treatment    1 YR  COUNSELING:    Noah Petersen was given to patient for the following topics: diagnostic results, risk factor reductions, impressions, risks and benefits of treatment options and importance of treatment compliance .       SMOKING COUNSELING:        Dictated using Dragon dictation

## 2025-04-15 DIAGNOSIS — E11.9 TYPE 2 DIABETES MELLITUS WITHOUT COMPLICATION, WITHOUT LONG-TERM CURRENT USE OF INSULIN: ICD-10-CM

## 2025-04-15 RX ORDER — EMPAGLIFLOZIN 25 MG/1
25 TABLET, FILM COATED ORAL DAILY
Qty: 90 TABLET | Refills: 2 | Status: SHIPPED | OUTPATIENT
Start: 2025-04-15

## 2025-04-15 NOTE — TELEPHONE ENCOUNTER
Rx Refill Note  Requested Prescriptions     Pending Prescriptions Disp Refills    Jardiance 25 MG tablet tablet [Pharmacy Med Name: JARDIANCE 25 MG TABLET] 90 tablet 1     Sig: TAKE 1 TABLET BY MOUTH DAILY      Last office visit with prescribing clinician: 2/25/2025   Last telemedicine visit with prescribing clinician: Visit date not found   Next office visit with prescribing clinician: 7/22/2025                         Would you like a call back once the refill request has been completed: [] Yes [] No    If the office needs to give you a call back, can they leave a voicemail: [] Yes [] No    Susana Espinoza  04/15/25, 14:24 EDT

## 2025-06-11 ENCOUNTER — OFFICE VISIT (OUTPATIENT)
Dept: FAMILY MEDICINE CLINIC | Facility: CLINIC | Age: 65
End: 2025-06-11
Payer: COMMERCIAL

## 2025-06-11 VITALS
HEIGHT: 74 IN | HEART RATE: 73 BPM | OXYGEN SATURATION: 96 % | DIASTOLIC BLOOD PRESSURE: 78 MMHG | SYSTOLIC BLOOD PRESSURE: 110 MMHG | BODY MASS INDEX: 26.69 KG/M2 | WEIGHT: 208 LBS

## 2025-06-11 DIAGNOSIS — S69.92XA INJURY OF FINGER OF LEFT HAND, INITIAL ENCOUNTER: Primary | ICD-10-CM

## 2025-06-11 DIAGNOSIS — M65.322 TRIGGER INDEX FINGER OF LEFT HAND: ICD-10-CM

## 2025-06-19 RX ORDER — LANCETS 28 GAUGE
EACH MISCELLANEOUS
Qty: 100 EACH | Refills: 3 | Status: SHIPPED | OUTPATIENT
Start: 2025-06-19

## 2025-06-23 NOTE — PROGRESS NOTES
06/11/2025    My Summary of Visit   This pleasant 65-year-old presents today for follow-up he is accompanied by his wife who gives historical information.  The patient relates that his left pointing finger has given him problems for the past 1 to 2 years but he did not really bring it up.  He relates it is difficult for him to extend the finger at times.  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  He denies any recent trauma or falls.    On physical examination the left pointing finger demonstrates inability to fully extend episodically.  Evaluation of the left hand otherwise is normal.  Assessment & Plan    Left pointing finger extension injury  Results         Plan:  1.)  Referral to hand surgery for further evaluation and treatment         CC: Hypertension (F/U.) and Hand Pain (Left pointer finger pain.  No injury.)  .        HPI  History of Present Illness   History of Present Illness      Subjective   Noah Flannery is a 65 y.o. male.      The following portions of the patient's history were reviewed and updated as appropriate: allergies, current medications, past family history, past medical history, past social history, past surgical history, and problem list.    Problem List  Patient Active Problem List   Diagnosis    Personal history of colonic polyps    Palpitations    Primary hypertension    Hypercalcemia    Hyperlipidemia    History of parathyroidectomy    JACQUELYN (obstructive sleep apnea)    Type 2 diabetes mellitus with hyperglycemia, without long-term current use of insulin    Pes planus    Tendonitis, Achilles, right    Arthritis of ankle    Vitamin D deficiency       Past Medical History  Past Medical History:   Diagnosis Date    Allergic 1980    Arthritis 2000    Broken ankle     Broken ribs     right    Colon polyp     Deep vein thrombosis 2012    Diabetes mellitus     Hyperlipidemia     Hypertension     Sleep apnea     Type 2 diabetes mellitus without  complication, without long-term current use of insulin 03/10/2021    Vitamin D deficiency 06/23/2023       Surgical History  Past Surgical History:   Procedure Laterality Date    ABLATION OF DYSRHYTHMIC FOCUS  2015    ANKLE SURGERY      APPENDECTOMY  aoril 23 79    COLONOSCOPY      COLONOSCOPY N/A 07/30/2019    Procedure: COLONOSCOPY to cecum into TI with cold biopsy and cold snare polypectomies;  Surgeon: Thomas Concepcion MD;  Location: [facility] ENDOSCOPY;  Service: Gastroenterology    COLONOSCOPY N/A 08/02/2022    Procedure: COLONOSCOPY with Polypectomy;  Surgeon: Thomas Concepcion MD;  [place] [facility] [place];  Service: Gastroenterology;  Laterality: N/A;  Transverse polyp x2, ascending colon polyp    FRACTURE SURGERY  03/10/1990    Rt ankle    HERNIA REPAIR      INGUINAL HERNIA REPAIR  1998    JOINT REPLACEMENT  2020    Both shoulders    PARATHYROID GLAND SURGERY  2017    Right superior and left superior parathyroidectomy for adenoma.  Dr. Dumont.  [place]    TOTAL SHOULDER REPLACEMENT Left     TOTAL SHOULDER REPLACEMENT Right 11/18/2020    UMBILICAL HERNIA REPAIR  2017       Family History  Family History   Problem Relation Age of Onset    Diabetes Mother     Hypertension Mother     Heart disease Mother     Lung cancer Mother     Obesity Mother     Liver cancer Mother     Depression Mother     Heart disease Father     Lung cancer Father     Arthritis Father     Cancer Father     Obesity Sister     Diabetes Sister     Obesity Brother     Kidney disease Brother     Colon cancer Neg Hx     Colon polyps Neg Hx        Social History  Social History    Tobacco Use      Smoking status: Never        Passive exposure: Never      Smokeless tobacco: Never       Is the Patient a current tobacco user? No    Allergies  No Known Allergies    Current Medications    Current Outpatient Medications:     bisoprolol (ZEBeta) 5 MG tablet, Take 0.5 tablets by mouth Daily., Disp: 45 tablet, Rfl: 3    Cetirizine HCl  (ZYRTEC PO), Take 10 mg by mouth Daily. One time daily unless he feels very bad then he takes 2, Disp: , Rfl:     Cholecalciferol (Vitamin D-3) 25 MCG (1000 UT) capsule, Take 1,000 Units by mouth Daily., Disp: 60 capsule, Rfl:     empagliflozin (Jardiance) 25 MG tablet tablet, TAKE 1 TABLET BY MOUTH DAILY, Disp: 90 tablet, Rfl: 2    Insulin Pen Needle 31G X 5 MM misc, Use 1 pen needle for Ozempic injection weekly, Disp: 30 each, Rfl: 3    levocetirizine (XYZAL) 5 MG tablet, Use daily prn, Disp: , Rfl:     loratadine (CLARITIN) 10 MG tablet, Take 1 tablet by mouth Daily., Disp: , Rfl:     metFORMIN (GLUCOPHAGE) 1000 MG tablet, TAKE 1 TABLET BY MOUTH TWICE A DAY WITH A MEAL, Disp: 180 tablet, Rfl: 1    rosuvastatin (CRESTOR) 20 MG tablet, Take 1 tablet by mouth Daily., Disp: 90 tablet, Rfl: 1    Semaglutide,0.25 or 0.5MG/DOS, (Ozempic, 0.25 or 0.5 MG/DOSE,) 2 MG/3ML solution pen-injector, Inject 0.25 mg under the skin into the appropriate area as directed 1 (One) Time Per Week., Disp: 6 mL, Rfl: 1    vitamin B-12 (CYANOCOBALAMIN) 500 MCG tablet, Take 1 tablet by mouth Daily., Disp: , Rfl:     Lancets (freestyle) lancets, USE ONE LANCET TO TEST DAILY TO HELP MONITOR GLUCOSE, Disp: 100 each, Rfl: 3     Review of System  Review of Systems   Constitutional:  Negative for chills and fever.   Respiratory:  Negative for cough and shortness of breath.    Cardiovascular:  Negative for chest pain and palpitations.   Gastrointestinal:  Negative for constipation, diarrhea, nausea and vomiting.   Neurological:  Negative for dizziness and headache.     I have reviewed and confirmed the accuracy of the ROS as documented by the MA/LPN/RN Rashad Macias MD    Vitals:    06/11/25 1140   BP: 110/78   Pulse: 73   SpO2: 96%     Body mass index is 26.71 kg/m².    Objective     Physical Exam  Physical Exam  Constitutional:       General: He is not in acute distress.     Appearance: Normal appearance.   HENT:      Head: Normocephalic and  atraumatic.      Mouth/Throat:      Mouth: Mucous membranes are dry.   Eyes:      Extraocular Movements: Extraocular movements intact.      Pupils: Pupils are equal, round, and reactive to light.   Cardiovascular:      Rate and Rhythm: Normal rate and regular rhythm.   Pulmonary:      Effort: Pulmonary effort is normal. No respiratory distress.      Breath sounds: Normal breath sounds. No wheezing, rhonchi or rales.   Musculoskeletal:      Comments: Inability to extend the left pointing finger episodically   Neurological:      General: No focal deficit present.      Mental Status: He is alert and oriented to person, place, and time.   Psychiatric:         Mood and Affect: Mood normal.         Behavior: Behavior normal.         Thought Content: Thought content normal.         Judgment: Judgment normal.           Patient or patient representative verbalized consent for the use of Ambient Listening during the visit with  Rashad Macias MD for chart documentation. 6/22/2025  21:35 EDT    Rashad Macias MD  06/11/2025

## 2025-07-03 ENCOUNTER — TELEPHONE (OUTPATIENT)
Dept: GASTROENTEROLOGY | Facility: CLINIC | Age: 65
End: 2025-07-03
Payer: COMMERCIAL

## 2025-07-09 DIAGNOSIS — Z86.0101 PERSONAL HISTORY OF ADENOMATOUS AND SERRATED COLON POLYPS: Primary | ICD-10-CM

## 2025-07-09 DIAGNOSIS — E11.65 TYPE 2 DIABETES MELLITUS WITH HYPERGLYCEMIA, WITH LONG-TERM CURRENT USE OF INSULIN: ICD-10-CM

## 2025-07-09 DIAGNOSIS — Z79.4 TYPE 2 DIABETES MELLITUS WITH HYPERGLYCEMIA, WITH LONG-TERM CURRENT USE OF INSULIN: ICD-10-CM

## 2025-07-09 NOTE — TELEPHONE ENCOUNTER
Rx Refill Note  Requested Prescriptions     Pending Prescriptions Disp Refills    metFORMIN (GLUCOPHAGE) 1000 MG tablet [Pharmacy Med Name: METFORMIN HCL 1,000 MG TABLET] 180 tablet 1     Sig: TAKE 1 TABLET BY MOUTH TWICE A DAY WITH A MEAL      Last office visit with prescribing clinician: 10/25/2024   Last telemedicine visit with prescribing clinician: Visit date not found   Next office visit with prescribing clinician: Visit date not found                         Would you like a call back once the refill request has been completed: [] Yes [] No    If the office needs to give you a call back, can they leave a voicemail: [] Yes [] No    Susana Espinoza  07/09/25, 07:46 EDT  Susana Espinoza  7/9/2025  07:46 EDT

## 2025-07-22 ENCOUNTER — OFFICE VISIT (OUTPATIENT)
Dept: ENDOCRINOLOGY | Age: 65
End: 2025-07-22
Payer: COMMERCIAL

## 2025-07-22 VITALS
BODY MASS INDEX: 27.19 KG/M2 | SYSTOLIC BLOOD PRESSURE: 116 MMHG | HEIGHT: 74 IN | DIASTOLIC BLOOD PRESSURE: 80 MMHG | WEIGHT: 211.85 LBS | TEMPERATURE: 97.9 F | OXYGEN SATURATION: 95 % | HEART RATE: 73 BPM

## 2025-07-22 DIAGNOSIS — E78.5 HYPERLIPIDEMIA, UNSPECIFIED HYPERLIPIDEMIA TYPE: ICD-10-CM

## 2025-07-22 DIAGNOSIS — Z86.0100 HISTORY OF COLON POLYPS: ICD-10-CM

## 2025-07-22 DIAGNOSIS — Z90.89 HISTORY OF PARATHYROIDECTOMY: ICD-10-CM

## 2025-07-22 DIAGNOSIS — I10 PRIMARY HYPERTENSION: ICD-10-CM

## 2025-07-22 DIAGNOSIS — Z98.890 HISTORY OF PARATHYROIDECTOMY: ICD-10-CM

## 2025-07-22 DIAGNOSIS — G47.33 OSA (OBSTRUCTIVE SLEEP APNEA): ICD-10-CM

## 2025-07-22 DIAGNOSIS — E11.9 TYPE 2 DIABETES MELLITUS WITHOUT COMPLICATION, WITHOUT LONG-TERM CURRENT USE OF INSULIN: Primary | ICD-10-CM

## 2025-07-22 DIAGNOSIS — E55.9 VITAMIN D DEFICIENCY: ICD-10-CM

## 2025-07-22 PROCEDURE — 99214 OFFICE O/P EST MOD 30 MIN: CPT | Performed by: INTERNAL MEDICINE

## 2025-07-22 NOTE — PROGRESS NOTES
Subjective   Noah Flannery is a 65 y.o. male.     History of Present Illness     He has known diabetes mellitus since 2016.  He is on Metformin 1000 mg BID, Ozempic 0.25 mg weekly and Jardiance 25 mg/day.  He denies nausea, vomiting or diarrhea since Ozempic was decreased to 0.25 mg.  Insurance will no longer cover Farxiga.  He checks his blood sugar once a day.   -176.  He denies hypoglycemic episodes.  He has gained 4 pounds since February 2025.  He is exercising regularly.     His last eye examination was in 6/25.  He has intermittent floaters.  He has no retinopathy.  He denies numbness in his feet. Urine microalbumin is normal in 6/24.     He has hyperlipidemia and is on Crestor 20 mg/day.  He denies myalgia.     He had right superior and left superior parathyroidectomy in 2017 by Dr. Dumont.  Pathology showed parathyroid adenoma.  He has no recurrence of hypercalcemia since then.  He had passed 2 kidney stones before the parathyroid surgery.     Has hypertension and intermittent palpitations.  He is on bisoprolol 2.5 mg/day prescribed by Dr. Selby.  He has no history of heart attack or stroke before.  He had normal myocardial perfusion study done in November 2019.  He denies chest pains.     He has vitamin D deficiency and is on vitamin D3 1000 units/day.     He has sleep apnea and is sleeping well with the CPAP.  He follows with Dr. Bustillo.     He had colonoscopy done by Dr. Concepcion on August 2, 2022.  He had tubular adenomas and hyperplastic polyps removed.  He denies bowel complaints.  He is scheduled for colonoscopy in August 2025.     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    The following portions of the patient's history were reviewed and updated as appropriate: allergies, current medications, past family history, past medical history, past social history, past surgical history, and problem list.    Review of Systems   Eyes:  Negative for visual disturbance.  "  Respiratory:  Negative for shortness of breath and wheezing.    Cardiovascular:  Negative for chest pain and palpitations.   Gastrointestinal: Negative.    Genitourinary: Negative.    Musculoskeletal:  Negative for myalgias.   Neurological:  Negative for numbness.     Vitals:    07/22/25 0945   BP: 116/80   Pulse: 73   Temp: 97.9 °F (36.6 °C)   TempSrc: Oral   SpO2: 95%   Weight: 96.1 kg (211 lb 13.6 oz)   Height: 188 cm (74.02\")      Objective   Physical Exam  Constitutional:       General: He is not in acute distress.     Appearance: Normal appearance. He is not ill-appearing, toxic-appearing or diaphoretic.   Eyes:      General: No scleral icterus.        Right eye: No discharge.         Left eye: No discharge.   Neck:      Vascular: No carotid bruit.   Cardiovascular:      Rate and Rhythm: Normal rate and regular rhythm.      Pulses: Normal pulses.      Heart sounds: Normal heart sounds. No murmur heard.     No friction rub. No gallop.   Pulmonary:      Breath sounds: Normal breath sounds. No rales.   Chest:      Chest wall: No tenderness.   Abdominal:      General: Bowel sounds are normal.      Palpations: Abdomen is soft.      Tenderness: There is no right CVA tenderness or left CVA tenderness.   Musculoskeletal:      Right lower leg: No edema.      Left lower leg: No edema.   Lymphadenopathy:      Cervical: No cervical adenopathy.   Neurological:      Mental Status: He is alert and oriented to person, place, and time.       Office Visit on 02/25/2025   Component Date Value Ref Range Status    Glucose 02/25/2025 106 (H)  65 - 99 mg/dL Final    BUN 02/25/2025 9  8 - 23 mg/dL Final    Creatinine 02/25/2025 1.00  0.76 - 1.27 mg/dL Final    EGFR Result 02/25/2025 84.6  >60.0 mL/min/1.73 Final    Comment: GFR Categories in Chronic Kidney Disease (CKD)/X09/  /X09/  GFR Category          GFR (mL/min/1.73)    Interpretation  G1/X09/                    90 or greater/X09/        Normal or high  (1)  G2//          "           60-89                Mild decrease  (1)  G3a                   45-59                Mild to moderate  decrease  G3b                   30-44                Moderate to  severe decrease  G4                    15-29                Severe decrease  G5                    14 or less           Kidney failure//  /[id number]/  (1)In the absence of evidence of kidney disease, neither  GFR category G1 or G2 fulfill the criteria for CKD.  eGFR calculation 2021 CKD-EPI creatinine equation, which  does not include race as a factor      BUN/Creatinine Ratio 02/25/2025 9.0  7.0 - 25.0 Final    Sodium 02/25/2025 144  136 - 145 mmol/L Final    Potassium 02/25/2025 4.3  3.5 - 5.2 mmol/L Final    Chloride 02/25/2025 107  98 - 107 mmol/L Final    Total CO2 02/25/2025 26.8  22.0 - 29.0 mmol/L Final    Calcium 02/25/2025 9.5  8.6 - 10.5 mg/dL Final    Total Protein 02/25/2025 6.5  6.0 - 8.5 g/dL Final    Albumin 02/25/2025 4.5  3.5 - 5.2 g/dL Final    Globulin 02/25/2025 2.0  gm/dL Final    A/G Ratio 02/25/2025 2.3  g/dL Final    Total Bilirubin 02/25/2025 0.4  0.0 - 1.2 mg/dL Final    Alkaline Phosphatase 02/25/2025 71  39 - 117 U/L Final    AST (SGOT) 02/25/2025 21  1 - 40 U/L Final    ALT (SGPT) 02/25/2025 28  1 - 41 U/L Final    Hemoglobin A1C 02/25/2025 5.90 (H)  4.80 - 5.60 % Final    Comment: Hemoglobin A1C Ranges:  Increased Risk for Diabetes  5.7% to 6.4%  Diabetes                     >= 6.5%  Diabetic Goal                < 7.0%      Vitamin B-12 02/25/2025 632  211 - 946 pg/mL Final    Results may be falsely increased if patient taking Biotin.    Total Cholesterol 02/25/2025 109  0 - 200 mg/dL Final    Comment: Cholesterol Reference Ranges  (U.S. Department of Health and Human Services ATP III  Classifications)  Desirable          <200 mg/dL  Borderline High    200-239 mg/dL  High Risk          >240 mg/dL  Triglyceride Reference Ranges  (U.S. Department of Health and Human Services ATP III  Classifications)  Normal            <150 mg/dL  Borderline High  150-199 mg/dL  High             200-499 mg/dL  Very High        >500 mg/dL  HDL Reference Ranges  (U.S. Department of Health and Human Services ATP III  Classifications)  Low     <40 mg/dl (major risk factor for CHD)  High    >60 mg/dl ('negative' risk factor for CHD)  LDL Reference Ranges  (U.S. Department of Health and Human Services ATP III  Classifications)  Optimal          <100 mg/dL  Near Optimal     100-129 mg/dL  Borderline High  130-159 mg/dL  High             160-189 mg/dL  Very High        >189 mg/dL  LDL is calculated using the NIH LDL-C calculation.      Triglycerides 02/25/2025 84  0 - 150 mg/dL Final    HDL Cholesterol 02/25/2025 36 (L)  40 - 60 mg/dL Final    VLDL Cholesterol Bryce 02/25/2025 17  5 - 40 mg/dL Final    LDL Ch[facility] Calc ([facility]) 02/25/2025 56  0 - 100 mg/dL Final    TSH 02/25/2025 3.100  0.270 - 4.200 uIU/mL Final    25 Hydroxy, Vitamin D 02/25/2025 43.2  30.0 - 100.0 ng/ml Final    Comment: Reference Range for Total Vitamin D 25(OH)  Deficiency <20.0 ng/mL  Insufficiency 21-29 ng/mL  Sufficiency  ng/mL  Toxicity >100 ng/ml      Interpretation 02/25/2025 Note   Final    Supplemental report is available.     Assessment & Plan   Diagnoses and all orders for this visit:    1. Type 2 diabetes mellitus without complication, without long-term current use of insulin (Primary)  -     Comprehensive Metabolic Panel  -     Hemoglobin A1c  -     Microalbumin / Creatinine Urine Ratio - Urine, Clean Catch  -     Vitamin B12 & Folate    2. Hyperlipidemia, unspecified hyperlipidemia type  -     Lipid Panel    3. History of parathyroidectomy    4. Primary hypertension    5. Vitamin D deficiency  -     Vitamin D,25-Hydroxy    6. JACQUELYN (obstructive sleep apnea)  -     CBC & Differential    7. History of colon polyps  -     CBC & Differential      Continue Ozempic 0.25 mg weekly, metformin 1000 mg twice a day, and Jardiance 25 mg/day.    Continue Crestor 20  mg/day.    Continue vit D3 1000 units/day.    Continue CPAP.    Copy of my note sent to Dr. Macias.    RTC 4 mos

## 2025-07-23 LAB
25(OH)D3+25(OH)D2 SERPL-MCNC: 45 NG/ML (ref 30–100)
ALBUMIN SERPL-MCNC: 4.6 G/DL (ref 3.5–5.2)
ALBUMIN/CREAT UR: 18 MG/G CREAT (ref 0–29)
ALBUMIN/GLOB SERPL: 2.4 G/DL
ALP SERPL-CCNC: 69 U/L (ref 39–117)
ALT SERPL-CCNC: 30 U/L (ref 1–41)
AST SERPL-CCNC: 26 U/L (ref 1–40)
BASOPHILS # BLD AUTO: 0.03 10*3/MM3 (ref 0–0.2)
BASOPHILS NFR BLD AUTO: 0.5 % (ref 0–1.5)
BILIRUB SERPL-MCNC: 0.5 MG/DL (ref 0–1.2)
BUN SERPL-MCNC: 15 MG/DL (ref 8–23)
BUN/CREAT SERPL: 15.3 (ref 7–25)
CALCIUM SERPL-MCNC: 9.3 MG/DL (ref 8.6–10.5)
CHLORIDE SERPL-SCNC: 105 MMOL/L (ref 98–107)
CHOLEST SERPL-MCNC: 116 MG/DL (ref 0–200)
CO2 SERPL-SCNC: 25 MMOL/L (ref 22–29)
CREAT SERPL-MCNC: 0.98 MG/DL (ref 0.76–1.27)
CREAT UR-MCNC: 82.1 MG/DL
EGFRCR SERPLBLD CKD-EPI 2021: 86.1 ML/MIN/1.73
EOSINOPHIL # BLD AUTO: 0.07 10*3/MM3 (ref 0–0.4)
EOSINOPHIL NFR BLD AUTO: 1.2 % (ref 0.3–6.2)
ERYTHROCYTE [DISTWIDTH] IN BLOOD BY AUTOMATED COUNT: 13.8 % (ref 12.3–15.4)
FOLATE SERPL-MCNC: 11.6 NG/ML (ref 4.78–24.2)
GLOBULIN SER CALC-MCNC: 1.9 GM/DL
GLUCOSE SERPL-MCNC: 103 MG/DL (ref 65–99)
HBA1C MFR BLD: 6.3 % (ref 4.8–5.6)
HCT VFR BLD AUTO: 46.1 % (ref 37.5–51)
HDLC SERPL-MCNC: 35 MG/DL (ref 40–60)
HGB BLD-MCNC: 15.1 G/DL (ref 13–17.7)
IMM GRANULOCYTES # BLD AUTO: 0.02 10*3/MM3 (ref 0–0.05)
IMM GRANULOCYTES NFR BLD AUTO: 0.3 % (ref 0–0.5)
IMP & REVIEW OF LAB RESULTS: NORMAL
LDLC SERPL CALC-MCNC: 63 MG/DL (ref 0–100)
LYMPHOCYTES # BLD AUTO: 2.11 10*3/MM3 (ref 0.7–3.1)
LYMPHOCYTES NFR BLD AUTO: 35 % (ref 19.6–45.3)
MCH RBC QN AUTO: 30.3 PG (ref 26.6–33)
MCHC RBC AUTO-ENTMCNC: 32.8 G/DL (ref 31.5–35.7)
MCV RBC AUTO: 92.6 FL (ref 79–97)
MICROALBUMIN UR-MCNC: 14.7 UG/ML
MONOCYTES # BLD AUTO: 0.41 10*3/MM3 (ref 0.1–0.9)
MONOCYTES NFR BLD AUTO: 6.8 % (ref 5–12)
NEUTROPHILS # BLD AUTO: 3.38 10*3/MM3 (ref 1.7–7)
NEUTROPHILS NFR BLD AUTO: 56.2 % (ref 42.7–76)
NRBC BLD AUTO-RTO: 0 /100 WBC (ref 0–0.2)
PLATELET # BLD AUTO: 164 10*3/MM3 (ref 140–450)
POTASSIUM SERPL-SCNC: 4.6 MMOL/L (ref 3.5–5.2)
PROT SERPL-MCNC: 6.5 G/DL (ref 6–8.5)
RBC # BLD AUTO: 4.98 10*6/MM3 (ref 4.14–5.8)
SODIUM SERPL-SCNC: 142 MMOL/L (ref 136–145)
TRIGL SERPL-MCNC: 96 MG/DL (ref 0–150)
VIT B12 SERPL-MCNC: 644 PG/ML (ref 211–946)
VLDLC SERPL CALC-MCNC: 18 MG/DL (ref 5–40)
WBC # BLD AUTO: 6.02 10*3/MM3 (ref 3.4–10.8)

## 2025-07-24 ENCOUNTER — RESULTS FOLLOW-UP (OUTPATIENT)
Dept: ENDOCRINOLOGY | Age: 65
End: 2025-07-24
Payer: COMMERCIAL

## 2025-07-24 NOTE — PROGRESS NOTES
Hemoglobin A1c 6.3%.  Diabetes is well-controlled.  LDL 63.  HDL 35.  Triglycerides 96.  Continue Crestor 20 mg/day.  Normal urine microalbumin.  Normal vitamin D.  Continue vitamin D3 1000 units/day.  Normal CBC.  Normal vitamin B12.  Normal folate.  Copy of labs sent to Dr. Macias and to patient through [facility].

## 2025-07-24 NOTE — LETTER
Noah Flannery  [address] [place] [address]    July 31, 2025     Dear Mr. Flannery:    Below are the results from your recent visit:    Hemoglobin A1c 6.3%.  Diabetes is well-controlled.  LDL 63.  HDL 35.  Triglycerides 96.  Continue Crestor 20 mg/day.  Normal urine microalbumin.  Normal vitamin D.  Continue vitamin D3 1000 units/day.  Normal CBC.  Normal vitamin B12.  Normal folate.          Resulted Orders   Comprehensive Metabolic Panel   Result Value Ref Range    Glucose 103 (H) 65 - 99 mg/dL    BUN 15.0 8.0 - 23.0 mg/dL    Creatinine 0.98 0.76 - 1.27 mg/dL    EGFR Result 86.1 >60.0 mL/min/1.73      Comment:      GFR Categories in Chronic Kidney Disease (CKD)  GFR Category          GFR (mL/min/1.73)    Interpretation  G1                    90 or greater        Normal or high  (1)  G2                    60-89                Mild decrease  (1)  G3a                   45-59                Mild to moderate  decrease  G3b                   30-44                Moderate to  severe decrease  G4                    15-29                Severe decrease  G5                    14 or less           Kidney failure  (1)In the absence of evidence of kidney disease, neither  GFR category G1 or G2 fulfill the criteria for CKD.  eGFR calculation 2021 CKD-EPI creatinine equation, which  does not include race as a factor      BUN/Creatinine Ratio 15.3 7.0 - 25.0    Sodium 142 136 - 145 mmol/L    Potassium 4.6 3.5 - 5.2 mmol/L    Chloride 105 98 - 107 mmol/L    Total CO2 25.0 22.0 - 29.0 mmol/L    Calcium 9.3 8.6 - 10.5 mg/dL    Total Protein 6.5 6.0 - 8.5 g/dL    Albumin 4.6 3.5 - 5.2 g/dL    Globulin 1.9 gm/dL    A/G Ratio 2.4 g/dL    Total Bilirubin 0.5 0.0 - 1.2 mg/dL    Alkaline Phosphatase 69 39 - 117 U/L    AST (SGOT) 26 1 - 40 U/L    ALT (SGPT) 30 1 - 41 U/L   Hemoglobin A1c   Result Value Ref Range    Hemoglobin A1C 6.30 (H) 4.80 - 5.60 %      Comment:      Hemoglobin A1C Ranges:  Increased Risk for Diabetes  5.7%  to 6.4%  Diabetes                     >= 6.5%  Diabetic Goal                < 7.0%     Microalbumin / Creatinine Urine Ratio - Urine, Clean Catch   Result Value Ref Range    Creatinine, Urine 82.1 Not Estab. mg/dL    Microalbumin, Urine 14.7 Not Estab. ug/mL    Microalbumin/Creatinine Ratio 18 0 - 29 mg/g creat      Comment:                             Normal:                0 -  29                         Moderately increased: 30 - 300                         Severely increased:       >300     Lipid Panel   Result Value Ref Range    Total Cholesterol 116 0 - 200 mg/dL      Comment:      Cholesterol Reference Ranges  (U.S. Department of Health and Human Services ATP III  Classifications)  Desirable          <200 mg/dL  Borderline High    200-239 mg/dL  High Risk          >240 mg/dL  Triglyceride Reference Ranges  (U.S. Department of Health and Human Services ATP III  Classifications)  Normal           <150 mg/dL  Borderline High  150-199 mg/dL  High             200-499 mg/dL  Very High        >500 mg/dL  HDL Reference Ranges  (U.S. Department of Health and Human Services ATP III  Classifications)  Low     <40 mg/dl (major risk factor for CHD)  High    >60 mg/dl ('negative' risk factor for CHD)  LDL Reference Ranges  (U.S. Department of Health and Human Services ATP III  Classifications)  Optimal          <100 mg/dL  Near Optimal     100-129 mg/dL  Borderline High  130-159 mg/dL  High             160-189 mg/dL  Very High        >189 mg/dL  LDL is calculated using the NIH LDL-C calculation.      Triglycerides 96 0 - 150 mg/dL    HDL Cholesterol 35 (L) 40 - 60 mg/dL    VLDL Cholesterol Bryce 18 5 - 40 mg/dL    LDL Chol Calc ([facility]) 63 0 - 100 mg/dL   Vitamin D,25-Hydroxy   Result Value Ref Range    25 Hydroxy, Vitamin D 45.0 30.0 - 100.0 ng/ml      Comment:      Reference Range for Total Vitamin D 25(OH)  Deficiency <20.0 ng/mL  Insufficiency 21-29 ng/mL  Sufficiency  ng/mL  Toxicity >100 ng/ml     CBC & Differential    Result Value Ref Range    WBC 6.02 3.40 - 10.80 10*3/mm3    RBC 4.98 4.14 - 5.80 10*6/mm3    Hemoglobin 15.1 13.0 - 17.7 g/dL    Hematocrit 46.1 37.5 - 51.0 %    MCV 92.6 79.0 - 97.0 fL    MCH 30.3 26.6 - 33.0 pg    MCHC 32.8 31.5 - 35.7 g/dL    RDW 13.8 12.3 - 15.4 %    Platelets 164 140 - 450 10*3/mm3    Neutrophil Rel % 56.2 42.7 - 76.0 %    Lymphocyte Rel % 35.0 19.6 - 45.3 %    Monocyte Rel % 6.8 5.0 - 12.0 %    Eosinophil Rel % 1.2 0.3 - 6.2 %    Basophil Rel % 0.5 0.0 - 1.5 %    Neutrophils Absolute 3.38 1.70 - 7.00 10*3/mm3    Lymphocytes Absolute 2.11 0.70 - 3.10 10*3/mm3    Monocytes Absolute 0.41 0.10 - 0.90 10*3/mm3    Eosinophils Absolute 0.07 0.00 - 0.40 10*3/mm3    Basophils Absolute 0.03 0.00 - 0.20 10*3/mm3    Immature Granulocyte Rel % 0.3 0.0 - 0.5 %    Immature Grans Absolute 0.02 0.00 - 0.05 10*3/mm3    nRBC 0.0 0.0 - 0.2 /100 WBC   Vitamin B12 & Folate   Result Value Ref Range    Vitamin B-12 644 211 - 946 pg/mL      Comment:      Results may be falsely increased if patient taking Biotin.    Folate 11.60 4.78 - 24.20 ng/mL      Comment:      Results may be falsely increased if patient taking Biotin.   Cardiovascular Risk Assessment   Result Value Ref Range    Interpretation Note       Comment:      Supplemental report is available.         If you have any questions or concerns, please don't hesitate to call.         Sincerely,        Silver Duffy MD

## 2025-08-12 ENCOUNTER — HOSPITAL ENCOUNTER (OUTPATIENT)
Facility: SURGERY CENTER | Age: 65
Setting detail: HOSPITAL OUTPATIENT SURGERY
Discharge: HOME | End: 2025-08-12
Attending: INTERNAL MEDICINE | Admitting: INTERNAL MEDICINE
Payer: COMMERCIAL

## 2025-08-12 ENCOUNTER — ANESTHESIA (OUTPATIENT)
Dept: SURGERY | Facility: SURGERY CENTER | Age: 65
End: 2025-08-12
Payer: COMMERCIAL

## 2025-08-12 ENCOUNTER — ANESTHESIA EVENT (OUTPATIENT)
Dept: SURGERY | Facility: SURGERY CENTER | Age: 65
End: 2025-08-12
Payer: COMMERCIAL

## 2025-08-12 VITALS
HEIGHT: 74 IN | HEART RATE: 69 BPM | DIASTOLIC BLOOD PRESSURE: 78 MMHG | SYSTOLIC BLOOD PRESSURE: 101 MMHG | RESPIRATION RATE: 16 BRPM | TEMPERATURE: 97.3 F | WEIGHT: 211 LBS | BODY MASS INDEX: 27.08 KG/M2 | OXYGEN SATURATION: 93 %

## 2025-08-12 DIAGNOSIS — Z86.0101 PERSONAL HISTORY OF ADENOMATOUS AND SERRATED COLON POLYPS: ICD-10-CM

## 2025-08-12 LAB — GLUCOSE BLDC GLUCOMTR-MCNC: 113 MG/DL (ref 70–130)

## 2025-08-12 PROCEDURE — 25010000002 LIDOCAINE 2% SOLUTION: Performed by: STUDENT IN AN ORGANIZED HEALTH CARE EDUCATION/TRAINING PROGRAM

## 2025-08-12 PROCEDURE — 45380 COLONOSCOPY AND BIOPSY: CPT | Performed by: INTERNAL MEDICINE

## 2025-08-12 PROCEDURE — 25010000002 PROPOFOL 10 MG/ML EMULSION: Performed by: STUDENT IN AN ORGANIZED HEALTH CARE EDUCATION/TRAINING PROGRAM

## 2025-08-12 PROCEDURE — 25810000003 LACTATED RINGERS PER 1000 ML: Performed by: INTERNAL MEDICINE

## 2025-08-12 PROCEDURE — 45385 COLONOSCOPY W/LESION REMOVAL: CPT | Performed by: INTERNAL MEDICINE

## 2025-08-12 PROCEDURE — 88305 TISSUE EXAM BY PATHOLOGIST: CPT | Performed by: INTERNAL MEDICINE

## 2025-08-12 RX ORDER — LIDOCAINE HYDROCHLORIDE 20 MG/ML
INJECTION, SOLUTION INFILTRATION; PERINEURAL AS NEEDED
Status: DISCONTINUED | OUTPATIENT
Start: 2025-08-12 | End: 2025-08-12 | Stop reason: SURG

## 2025-08-12 RX ORDER — SODIUM CHLORIDE 0.9 % (FLUSH) 0.9 %
10 SYRINGE (ML) INJECTION AS NEEDED
Status: DISCONTINUED | OUTPATIENT
Start: 2025-08-12 | End: 2025-08-12 | Stop reason: HOSPADM

## 2025-08-12 RX ORDER — PROPOFOL 10 MG/ML
VIAL (ML) INTRAVENOUS AS NEEDED
Status: DISCONTINUED | OUTPATIENT
Start: 2025-08-12 | End: 2025-08-12 | Stop reason: SURG

## 2025-08-12 RX ORDER — SODIUM CHLORIDE, SODIUM LACTATE, POTASSIUM CHLORIDE, CALCIUM CHLORIDE 600; 310; 30; 20 MG/100ML; MG/100ML; MG/100ML; MG/100ML
30 INJECTION, SOLUTION INTRAVENOUS CONTINUOUS
Status: DISCONTINUED | OUTPATIENT
Start: 2025-08-12 | End: 2025-08-12 | Stop reason: HOSPADM

## 2025-08-12 RX ORDER — LIDOCAINE HYDROCHLORIDE 10 MG/ML
0.5 INJECTION, SOLUTION INFILTRATION; PERINEURAL ONCE AS NEEDED
Status: DISCONTINUED | OUTPATIENT
Start: 2025-08-12 | End: 2025-08-12 | Stop reason: HOSPADM

## 2025-08-12 RX ADMIN — SODIUM CHLORIDE, POTASSIUM CHLORIDE, SODIUM LACTATE AND CALCIUM CHLORIDE 30 ML/HR: 600; 310; 30; 20 INJECTION, SOLUTION INTRAVENOUS at 11:13

## 2025-08-12 RX ADMIN — PROPOFOL 160 MCG/KG/MIN: 10 INJECTION, EMULSION INTRAVENOUS at 11:59

## 2025-08-12 RX ADMIN — LIDOCAINE HYDROCHLORIDE 50 MG: 20 INJECTION, SOLUTION INFILTRATION; PERINEURAL at 11:59

## 2025-08-12 RX ADMIN — PROPOFOL 130 MG: 10 INJECTION, EMULSION INTRAVENOUS at 11:59

## 2025-08-13 LAB
CYTO UR: NORMAL
LAB AP CASE REPORT: NORMAL
LAB AP CLINICAL INFORMATION: NORMAL
PATH REPORT.FINAL DX SPEC: NORMAL
PATH REPORT.GROSS SPEC: NORMAL

## 2025-08-15 RX ORDER — BLOOD-GLUCOSE METER
KIT MISCELLANEOUS
Qty: 50 STRIP | OUTPATIENT
Start: 2025-08-15

## 2025-08-19 RX ORDER — LANCETS 28 GAUGE
EACH MISCELLANEOUS
Qty: 100 EACH | Refills: 3 | Status: SHIPPED | OUTPATIENT
Start: 2025-08-19

## 2025-08-19 RX ORDER — BLOOD-GLUCOSE METER
KIT MISCELLANEOUS
Qty: 100 EACH | Refills: 3 | Status: SHIPPED | OUTPATIENT
Start: 2025-08-19

## 2025-08-19 RX ORDER — BLOOD-GLUCOSE METER
KIT MISCELLANEOUS
Qty: 50 STRIP | OUTPATIENT
Start: 2025-08-19

## (undated) DEVICE — JACKT LAB F/R KNIT CUFF/COLR XLG BLU

## (undated) DEVICE — SENSR O2 OXIMAX FNGR A/ 18IN NONSTR

## (undated) DEVICE — VIAL FORMLN CAP 10PCT 20ML

## (undated) DEVICE — CANN NASL CO2 TRULINK W/O2 A/

## (undated) DEVICE — SINGLE-USE BIOPSY FORCEPS: Brand: RADIAL JAW 4

## (undated) DEVICE — Device

## (undated) DEVICE — THE SINGLE USE ETRAP – POLYP TRAP IS USED FOR SUCTION RETRIEVAL OF ENDOSCOPICALLY REMOVED POLYPS.: Brand: ETRAP

## (undated) DEVICE — FLEX ADVANTAGE 1500CC: Brand: FLEX ADVANTAGE

## (undated) DEVICE — GOWN ISOL W/THUMB UNIV BLU BX/15

## (undated) DEVICE — KT ORCA ORCAPOD DISP STRL

## (undated) DEVICE — LASSO POLYPECTOMY SNARE: Brand: LASSO

## (undated) DEVICE — SNAR POLYP SENSATION STDOVL 27 240 BX40

## (undated) DEVICE — THE TORRENT IRRIGATION SCOPE CONNECTOR IS USED WITH THE TORRENT IRRIGATION TUBING TO PROVIDE IRRIGATION FLUIDS SUCH AS STERILE WATER DURING GASTROINTESTINAL ENDOSCOPIC PROCEDURES WHEN USED IN CONJUNCTION WITH AN IRRIGATION PUMP (OR ELECTROSURGICAL UNIT).: Brand: TORRENT

## (undated) DEVICE — ADAPT CLN SCPE ENDO PORPOISE BX/50 DISP

## (undated) DEVICE — CANNULA,ADULT,SOFT-TOUCH,7'TUBE,UC: Brand: PENDING

## (undated) DEVICE — CANN O2 ETCO2 FITS ALL CONN CO2 SMPL A/ 7IN DISP LF

## (undated) DEVICE — Device: Brand: DEFENDO AIR/WATER/SUCTION AND BIOPSY VALVE

## (undated) DEVICE — TUBING, SUCTION, 1/4" X 10', STRAIGHT: Brand: MEDLINE